# Patient Record
Sex: MALE | Race: BLACK OR AFRICAN AMERICAN | Employment: FULL TIME | URBAN - METROPOLITAN AREA
[De-identification: names, ages, dates, MRNs, and addresses within clinical notes are randomized per-mention and may not be internally consistent; named-entity substitution may affect disease eponyms.]

---

## 2017-07-24 ENCOUNTER — HOSPITAL ENCOUNTER (EMERGENCY)
Age: 27
Discharge: HOME | End: 2017-07-24
Attending: EMERGENCY MEDICINE
Payer: MEDICAID

## 2017-07-24 VITALS
OXYGEN SATURATION: 100 % | WEIGHT: 150.25 LBS | HEIGHT: 66 IN | HEART RATE: 92 BPM | DIASTOLIC BLOOD PRESSURE: 84 MMHG | RESPIRATION RATE: 16 BRPM | SYSTOLIC BLOOD PRESSURE: 150 MMHG | BODY MASS INDEX: 24.15 KG/M2 | TEMPERATURE: 98.7 F

## 2017-07-24 DIAGNOSIS — I10 ESSENTIAL HYPERTENSION: ICD-10-CM

## 2017-07-24 DIAGNOSIS — Z76.0 ENCOUNTER FOR MEDICATION REFILL: Primary | ICD-10-CM

## 2017-07-24 LAB
CHP ED QC CHECK: YES
GLUCOSE BLD-MCNC: 150 MG/DL
GLUCOSE BLD-MCNC: 150 MG/DL (ref 75–110)

## 2017-07-24 PROCEDURE — 82947 ASSAY GLUCOSE BLOOD QUANT: CPT

## 2017-07-24 PROCEDURE — 99281 EMR DPT VST MAYX REQ PHY/QHP: CPT

## 2017-09-08 ENCOUNTER — APPOINTMENT (OUTPATIENT)
Dept: GENERAL RADIOLOGY | Age: 27
End: 2017-09-08
Payer: MEDICAID

## 2017-09-08 ENCOUNTER — HOSPITAL ENCOUNTER (EMERGENCY)
Age: 27
Discharge: HOME | End: 2017-09-08
Attending: EMERGENCY MEDICINE
Payer: MEDICAID

## 2017-09-08 VITALS
WEIGHT: 151.8 LBS | TEMPERATURE: 98.7 F | BODY MASS INDEX: 24.4 KG/M2 | OXYGEN SATURATION: 98 % | RESPIRATION RATE: 18 BRPM | HEIGHT: 66 IN | SYSTOLIC BLOOD PRESSURE: 150 MMHG | DIASTOLIC BLOOD PRESSURE: 89 MMHG | HEART RATE: 99 BPM

## 2017-09-08 DIAGNOSIS — J06.9 VIRAL URI: Primary | ICD-10-CM

## 2017-09-08 LAB
ABSOLUTE EOS #: 0.1 K/UL (ref 0–0.4)
ABSOLUTE LYMPH #: 1.4 K/UL (ref 1–4.8)
ABSOLUTE MONO #: 0.4 K/UL (ref 0.2–0.8)
AMYLASE: 56 U/L (ref 28–100)
ANION GAP SERPL CALCULATED.3IONS-SCNC: 18 MMOL/L (ref 9–17)
BASOPHILS # BLD: 0 %
BASOPHILS ABSOLUTE: 0 K/UL (ref 0–0.2)
BETA-HYDROXYBUTYRATE: 0.96 MMOL/L (ref 0.02–0.27)
BUN BLDV-MCNC: 17 MG/DL (ref 6–20)
BUN/CREAT BLD: 17 (ref 9–20)
CALCIUM SERPL-MCNC: 9.6 MG/DL (ref 8.6–10.4)
CHLORIDE BLD-SCNC: 96 MMOL/L (ref 98–107)
CHP ED QC CHECK: NORMAL
CO2: 24 MMOL/L (ref 20–31)
CREAT SERPL-MCNC: 0.99 MG/DL (ref 0.7–1.2)
DIFFERENTIAL TYPE: NORMAL
DIRECT EXAM: NORMAL
EOSINOPHILS RELATIVE PERCENT: 1 %
GFR AFRICAN AMERICAN: >60 ML/MIN
GFR NON-AFRICAN AMERICAN: >60 ML/MIN
GFR SERPL CREATININE-BSD FRML MDRD: ABNORMAL ML/MIN/{1.73_M2}
GFR SERPL CREATININE-BSD FRML MDRD: ABNORMAL ML/MIN/{1.73_M2}
GLUCOSE BLD-MCNC: 134 MG/DL
GLUCOSE BLD-MCNC: 134 MG/DL (ref 75–110)
GLUCOSE BLD-MCNC: 142 MG/DL (ref 70–99)
HCT VFR BLD CALC: 43.6 % (ref 41–53)
HEMOGLOBIN: 14.6 G/DL (ref 13.5–17.5)
LACTIC ACID, SEPSIS WHOLE BLOOD: NORMAL MMOL/L (ref 0.5–1.9)
LACTIC ACID, SEPSIS: 1.1 MMOL/L (ref 0.5–1.9)
LIPASE: 7 U/L (ref 13–60)
LYMPHOCYTES # BLD: 27 %
Lab: NORMAL
MCH RBC QN AUTO: 31.7 PG (ref 26–34)
MCHC RBC AUTO-ENTMCNC: 33.4 G/DL (ref 31–37)
MCV RBC AUTO: 95.1 FL (ref 80–100)
MONOCYTES # BLD: 8 %
PDW BLD-RTO: 13.7 % (ref 11.5–14.5)
PLATELET # BLD: 256 K/UL (ref 130–400)
PLATELET ESTIMATE: NORMAL
PMV BLD AUTO: 7.1 FL (ref 6–12)
POTASSIUM SERPL-SCNC: 3.6 MMOL/L (ref 3.7–5.3)
RBC # BLD: 4.59 M/UL (ref 4.5–5.9)
RBC # BLD: NORMAL 10*6/UL
SEG NEUTROPHILS: 64 %
SEGMENTED NEUTROPHILS ABSOLUTE COUNT: 3.2 K/UL (ref 1.8–7.7)
SODIUM BLD-SCNC: 138 MMOL/L (ref 135–144)
SPECIMEN DESCRIPTION: NORMAL
STATUS: NORMAL
TOTAL CK: 360 U/L (ref 39–308)
WBC # BLD: 5 K/UL (ref 3.5–11)
WBC # BLD: NORMAL 10*3/UL

## 2017-09-08 PROCEDURE — 85025 COMPLETE CBC W/AUTO DIFF WBC: CPT

## 2017-09-08 PROCEDURE — 83690 ASSAY OF LIPASE: CPT

## 2017-09-08 PROCEDURE — 81003 URINALYSIS AUTO W/O SCOPE: CPT

## 2017-09-08 PROCEDURE — 82947 ASSAY GLUCOSE BLOOD QUANT: CPT

## 2017-09-08 PROCEDURE — 2580000003 HC RX 258: Performed by: EMERGENCY MEDICINE

## 2017-09-08 PROCEDURE — 87651 STREP A DNA AMP PROBE: CPT

## 2017-09-08 PROCEDURE — 71020 XR CHEST STANDARD TWO VW: CPT

## 2017-09-08 PROCEDURE — 96361 HYDRATE IV INFUSION ADD-ON: CPT

## 2017-09-08 PROCEDURE — 87040 BLOOD CULTURE FOR BACTERIA: CPT

## 2017-09-08 PROCEDURE — 82010 KETONE BODYS QUAN: CPT

## 2017-09-08 PROCEDURE — 80048 BASIC METABOLIC PNL TOTAL CA: CPT

## 2017-09-08 PROCEDURE — 82550 ASSAY OF CK (CPK): CPT

## 2017-09-08 PROCEDURE — 99284 EMERGENCY DEPT VISIT MOD MDM: CPT

## 2017-09-08 PROCEDURE — 6360000002 HC RX W HCPCS: Performed by: EMERGENCY MEDICINE

## 2017-09-08 PROCEDURE — 96374 THER/PROPH/DIAG INJ IV PUSH: CPT

## 2017-09-08 PROCEDURE — 82150 ASSAY OF AMYLASE: CPT

## 2017-09-08 PROCEDURE — 83605 ASSAY OF LACTIC ACID: CPT

## 2017-09-08 RX ORDER — ONDANSETRON 4 MG/1
4 TABLET, FILM COATED ORAL EVERY 8 HOURS PRN
Qty: 22 TABLET | Refills: 0 | Status: SHIPPED | OUTPATIENT
Start: 2017-09-08 | End: 2018-01-24 | Stop reason: ALTCHOICE

## 2017-09-08 RX ORDER — 0.9 % SODIUM CHLORIDE 0.9 %
1000 INTRAVENOUS SOLUTION INTRAVENOUS ONCE
Status: COMPLETED | OUTPATIENT
Start: 2017-09-08 | End: 2017-09-08

## 2017-09-08 RX ORDER — ONDANSETRON 2 MG/ML
4 INJECTION INTRAMUSCULAR; INTRAVENOUS ONCE
Status: COMPLETED | OUTPATIENT
Start: 2017-09-08 | End: 2017-09-08

## 2017-09-08 RX ORDER — SODIUM CHLORIDE 9 MG/ML
INJECTION, SOLUTION INTRAVENOUS CONTINUOUS
Status: DISCONTINUED | OUTPATIENT
Start: 2017-09-08 | End: 2017-09-08 | Stop reason: HOSPADM

## 2017-09-08 RX ADMIN — SODIUM CHLORIDE 1000 ML: 9 INJECTION, SOLUTION INTRAVENOUS at 18:57

## 2017-09-08 RX ADMIN — ONDANSETRON 4 MG: 2 INJECTION INTRAMUSCULAR; INTRAVENOUS at 19:05

## 2017-09-08 RX ADMIN — SODIUM CHLORIDE: 9 INJECTION, SOLUTION INTRAVENOUS at 19:57

## 2017-09-08 ASSESSMENT — ENCOUNTER SYMPTOMS
VOMITING: 0
ABDOMINAL DISTENTION: 0
NAUSEA: 1
SHORTNESS OF BREATH: 0
WHEEZING: 0
COUGH: 0
RHINORRHEA: 1
DIARRHEA: 0

## 2017-09-08 ASSESSMENT — PAIN SCALES - GENERAL: PAINLEVEL_OUTOF10: 8

## 2017-09-09 LAB
DIRECT EXAM: NORMAL
DIRECT EXAM: NORMAL
Lab: NORMAL
Lab: NORMAL
SPECIMEN DESCRIPTION: NORMAL
SPECIMEN DESCRIPTION: NORMAL
STATUS: NORMAL

## 2017-09-14 LAB
CULTURE: NORMAL
Lab: NORMAL
Lab: NORMAL
SPECIMEN DESCRIPTION: NORMAL
STATUS: NORMAL
STATUS: NORMAL

## 2017-12-14 ENCOUNTER — HOSPITAL ENCOUNTER (EMERGENCY)
Age: 27
Discharge: HOME | End: 2017-12-14
Attending: EMERGENCY MEDICINE
Payer: MEDICAID

## 2017-12-14 VITALS
DIASTOLIC BLOOD PRESSURE: 94 MMHG | SYSTOLIC BLOOD PRESSURE: 150 MMHG | RESPIRATION RATE: 18 BRPM | TEMPERATURE: 98.8 F | HEIGHT: 66 IN | BODY MASS INDEX: 22.5 KG/M2 | OXYGEN SATURATION: 99 % | WEIGHT: 140 LBS | HEART RATE: 94 BPM

## 2017-12-14 DIAGNOSIS — Z76.0 ENCOUNTER FOR MEDICATION REFILL: Primary | ICD-10-CM

## 2017-12-14 DIAGNOSIS — R73.9 HYPERGLYCEMIA: ICD-10-CM

## 2017-12-14 LAB
CHP ED QC CHECK: YES
CHP ED QC CHECK: YES
GLUCOSE BLD-MCNC: 257 MG/DL
GLUCOSE BLD-MCNC: 257 MG/DL (ref 75–110)
GLUCOSE BLD-MCNC: 368 MG/DL
GLUCOSE BLD-MCNC: 368 MG/DL (ref 75–110)

## 2017-12-14 PROCEDURE — 82947 ASSAY GLUCOSE BLOOD QUANT: CPT

## 2017-12-14 PROCEDURE — 96372 THER/PROPH/DIAG INJ SC/IM: CPT

## 2017-12-14 PROCEDURE — 6370000000 HC RX 637 (ALT 250 FOR IP): Performed by: STUDENT IN AN ORGANIZED HEALTH CARE EDUCATION/TRAINING PROGRAM

## 2017-12-14 PROCEDURE — 99281 EMR DPT VST MAYX REQ PHY/QHP: CPT

## 2017-12-14 RX ADMIN — INSULIN LISPRO 8 UNITS: 100 INJECTION, SOLUTION INTRAVENOUS; SUBCUTANEOUS at 11:49

## 2017-12-14 ASSESSMENT — ENCOUNTER SYMPTOMS
ABDOMINAL PAIN: 0
CONSTIPATION: 0
CHEST TIGHTNESS: 0
DIARRHEA: 0
VOMITING: 0
BACK PAIN: 0
ABDOMINAL DISTENTION: 0
BLOOD IN STOOL: 0
SORE THROAT: 0
SHORTNESS OF BREATH: 0
COUGH: 0
PHOTOPHOBIA: 0
NAUSEA: 0

## 2018-01-24 ENCOUNTER — HOSPITAL ENCOUNTER (OUTPATIENT)
Age: 28
Setting detail: SPECIMEN
Discharge: HOME | End: 2018-01-24
Payer: MEDICAID

## 2018-01-24 ENCOUNTER — OFFICE VISIT (OUTPATIENT)
Dept: FAMILY MEDICINE CLINIC | Age: 28
End: 2018-01-24
Payer: MEDICAID

## 2018-01-24 VITALS
SYSTOLIC BLOOD PRESSURE: 140 MMHG | HEIGHT: 66 IN | DIASTOLIC BLOOD PRESSURE: 70 MMHG | TEMPERATURE: 97.9 F | WEIGHT: 160.8 LBS | HEART RATE: 93 BPM | BODY MASS INDEX: 25.84 KG/M2

## 2018-01-24 DIAGNOSIS — E10.9 TYPE 1 DIABETES MELLITUS WITHOUT COMPLICATION (HCC): Primary | ICD-10-CM

## 2018-01-24 LAB
CREATININE URINE: 245.8 MG/DL (ref 39–259)
HBA1C MFR BLD: 9 %
MICROALBUMIN/CREAT 24H UR: 1916 MG/L
MICROALBUMIN/CREAT UR-RTO: 779 MCG/MG CREAT

## 2018-01-24 PROCEDURE — 82043 UR ALBUMIN QUANTITATIVE: CPT | Performed by: STUDENT IN AN ORGANIZED HEALTH CARE EDUCATION/TRAINING PROGRAM

## 2018-01-24 PROCEDURE — 83036 HEMOGLOBIN GLYCOSYLATED A1C: CPT | Performed by: STUDENT IN AN ORGANIZED HEALTH CARE EDUCATION/TRAINING PROGRAM

## 2018-01-24 PROCEDURE — 99214 OFFICE O/P EST MOD 30 MIN: CPT | Performed by: STUDENT IN AN ORGANIZED HEALTH CARE EDUCATION/TRAINING PROGRAM

## 2018-01-24 PROCEDURE — 99213 OFFICE O/P EST LOW 20 MIN: CPT

## 2018-01-24 ASSESSMENT — ENCOUNTER SYMPTOMS
SHORTNESS OF BREATH: 0
ABDOMINAL PAIN: 0
COUGH: 0

## 2018-01-24 NOTE — PATIENT INSTRUCTIONS
Thank you for letting us take care of you today. We hope all your questions were addressed. If a question was overlooked or something else comes to mind after you return home, please contact a member of your Care Team listed below. Please make sure you have a routine office visit set up to follow-up on [address]. Your Care Team at [place] [address] is Team #3  Юлия Shea MD (Faculty)  Army Erica MD (Faculty  Skip Hernandez MD (Resident)  Renae Poe MD (Resident)  Maria Luisa Ying MD (Resident)  Cole Khan MD (Resident)  [address], [facility]  Sunny Bird, [facility]  Tamiko Salas ([facility])  Ace Mendez RN, ([address] )  Rui Gregg, Ph.D., (Behavioral Services)  Heber Fuentes, [address] [place] [address] (Clinical Pharmacist)     Office phone number: [phone]    If you need to get in right away due to illness, please be advised we have \"Same Day\" appointments available Monday-Friday. Please call us at [phone] option #1 to schedule your \"Same Day\" appointment.

## 2018-01-24 NOTE — PROGRESS NOTES
Subjective:      Patient ID: Anisha Segal is a 32 y.o. male with history of type 1 diabetes diagnosed at 8years old, non-compliance, presents to establish new care. Patient has ran out of his medications multiple times in the past due to missing appointments and not having refills. Patient was recently dismissed from his last PCP a few months ago. Patient was recently seen in the ED and given refills for his insulin supplies. Patient denies polyuria, polydipsia or polyphagia. Denies fever or nausea or vomiting or abdominal pain. Denies dizziness. Says he has some type of syndrome possibly [place]. Patient smokes half a pack per day for the past 2 years. Denies alcohol. Denies illicit drug abuse except marijuana. Denies history of MI at an early age and family. Patient does not work currently. Used to [employer]. Is currently looking for work. HPI    Review of Systems   Constitutional: Negative for fatigue, fever and unexpected weight change. Eyes: Negative for visual disturbance. Respiratory: Negative for cough and shortness of breath. Cardiovascular: Negative for chest pain, palpitations and leg swelling. Gastrointestinal: Negative for abdominal pain. Genitourinary: Negative for difficulty urinating. Neurological: Negative for dizziness, light-headedness and headaches. Objective:   Physical Exam   Constitutional: He is oriented to person, place, and time. Cardiovascular: Normal rate, regular rhythm, normal heart sounds and intact distal pulses. Exam reveals no gallop and no friction rub. No murmur heard. Pulmonary/Chest: Effort normal and breath sounds normal. No respiratory distress. He has no wheezes. He has no rales. He exhibits no tenderness. Abdominal: Soft. Bowel sounds are normal. He exhibits no distension and no mass. There is no tenderness. There is no rebound and no guarding. Neurological: He is alert and oriented to person, place, and time.

## 2018-02-08 ENCOUNTER — HOSPITAL ENCOUNTER (EMERGENCY)
Age: 28
Discharge: HOME | End: 2018-02-08
Payer: MEDICAID

## 2018-02-08 ENCOUNTER — APPOINTMENT (OUTPATIENT)
Dept: GENERAL RADIOLOGY | Age: 28
End: 2018-02-08
Payer: MEDICAID

## 2018-02-08 VITALS
HEIGHT: 66 IN | SYSTOLIC BLOOD PRESSURE: 137 MMHG | HEART RATE: 109 BPM | WEIGHT: 160.44 LBS | BODY MASS INDEX: 25.78 KG/M2 | OXYGEN SATURATION: 91 % | RESPIRATION RATE: 34 BRPM | TEMPERATURE: 97.5 F | DIASTOLIC BLOOD PRESSURE: 71 MMHG

## 2018-02-08 DIAGNOSIS — J40 BRONCHITIS: Primary | ICD-10-CM

## 2018-02-08 DIAGNOSIS — J06.9 UPPER RESPIRATORY TRACT INFECTION, UNSPECIFIED TYPE: ICD-10-CM

## 2018-02-08 LAB
ABSOLUTE EOS #: 0.2 K/UL (ref 0–0.4)
ABSOLUTE IMMATURE GRANULOCYTE: ABNORMAL K/UL (ref 0–0.3)
ABSOLUTE LYMPH #: 2.3 K/UL (ref 1–4.8)
ABSOLUTE MONO #: 0.7 K/UL (ref 0.2–0.8)
ANION GAP SERPL CALCULATED.3IONS-SCNC: 17 MMOL/L (ref 9–17)
BASOPHILS # BLD: 1 % (ref 0–2)
BASOPHILS ABSOLUTE: 0.1 K/UL (ref 0–0.2)
BUN BLDV-MCNC: 18 MG/DL (ref 6–20)
BUN/CREAT BLD: 18 (ref 9–20)
CALCIUM SERPL-MCNC: 9.1 MG/DL (ref 8.6–10.4)
CHLORIDE BLD-SCNC: 101 MMOL/L (ref 98–107)
CO2: 23 MMOL/L (ref 20–31)
CREAT SERPL-MCNC: 0.98 MG/DL (ref 0.7–1.2)
DIFFERENTIAL TYPE: ABNORMAL
DIRECT EXAM: NORMAL
EKG ATRIAL RATE: 114 BPM
EKG P AXIS: 71 DEGREES
EKG P-R INTERVAL: 114 MS
EKG Q-T INTERVAL: 332 MS
EKG QRS DURATION: 88 MS
EKG QTC CALCULATION (BAZETT): 457 MS
EKG R AXIS: 10 DEGREES
EKG T AXIS: 37 DEGREES
EKG VENTRICULAR RATE: 114 BPM
EOSINOPHILS RELATIVE PERCENT: 3 % (ref 1–4)
GFR AFRICAN AMERICAN: >60 ML/MIN
GFR NON-AFRICAN AMERICAN: >60 ML/MIN
GFR SERPL CREATININE-BSD FRML MDRD: ABNORMAL ML/MIN/{1.73_M2}
GFR SERPL CREATININE-BSD FRML MDRD: ABNORMAL ML/MIN/{1.73_M2}
GLUCOSE BLD-MCNC: 141 MG/DL (ref 70–99)
GLUCOSE BLD-MCNC: 237 MG/DL (ref 75–110)
HCT VFR BLD CALC: 40.1 % (ref 41–53)
HEMOGLOBIN: 13.5 G/DL (ref 13.5–17.5)
IMMATURE GRANULOCYTES: ABNORMAL %
LYMPHOCYTES # BLD: 30 % (ref 24–44)
Lab: NORMAL
MCH RBC QN AUTO: 31.9 PG (ref 26–34)
MCHC RBC AUTO-ENTMCNC: 33.7 G/DL (ref 31–37)
MCV RBC AUTO: 94.7 FL (ref 80–100)
MONOCYTES # BLD: 10 % (ref 1–7)
MYOGLOBIN: 86 NG/ML (ref 28–72)
NRBC AUTOMATED: ABNORMAL PER 100 WBC
PDW BLD-RTO: 13.4 % (ref 11.5–14.5)
PLATELET # BLD: 255 K/UL (ref 130–400)
PLATELET ESTIMATE: ABNORMAL
PMV BLD AUTO: ABNORMAL FL (ref 6–12)
POTASSIUM SERPL-SCNC: 3.7 MMOL/L (ref 3.7–5.3)
RBC # BLD: 4.23 M/UL (ref 4.5–5.9)
RBC # BLD: ABNORMAL 10*6/UL
SEG NEUTROPHILS: 56 % (ref 36–66)
SEGMENTED NEUTROPHILS ABSOLUTE COUNT: 4.5 K/UL (ref 1.8–7.7)
SODIUM BLD-SCNC: 141 MMOL/L (ref 135–144)
SPECIMEN DESCRIPTION: NORMAL
STATUS: NORMAL
TROPONIN INTERP: ABNORMAL
TROPONIN T: <0.03 NG/ML
WBC # BLD: 7.8 K/UL (ref 3.5–11)
WBC # BLD: ABNORMAL 10*3/UL

## 2018-02-08 PROCEDURE — 83874 ASSAY OF MYOGLOBIN: CPT

## 2018-02-08 PROCEDURE — 2580000003 HC RX 258

## 2018-02-08 PROCEDURE — 84484 ASSAY OF TROPONIN QUANT: CPT

## 2018-02-08 PROCEDURE — 94640 AIRWAY INHALATION TREATMENT: CPT

## 2018-02-08 PROCEDURE — 96374 THER/PROPH/DIAG INJ IV PUSH: CPT

## 2018-02-08 PROCEDURE — 36415 COLL VENOUS BLD VENIPUNCTURE: CPT

## 2018-02-08 PROCEDURE — 87804 INFLUENZA ASSAY W/OPTIC: CPT

## 2018-02-08 PROCEDURE — 80048 BASIC METABOLIC PNL TOTAL CA: CPT

## 2018-02-08 PROCEDURE — 94150 VITAL CAPACITY TEST: CPT

## 2018-02-08 PROCEDURE — 6370000000 HC RX 637 (ALT 250 FOR IP)

## 2018-02-08 PROCEDURE — 99285 EMERGENCY DEPT VISIT HI MDM: CPT

## 2018-02-08 PROCEDURE — 2700000000 HC OXYGEN THERAPY PER DAY

## 2018-02-08 PROCEDURE — 85025 COMPLETE CBC W/AUTO DIFF WBC: CPT

## 2018-02-08 PROCEDURE — 6360000002 HC RX W HCPCS: Performed by: PODIATRIST

## 2018-02-08 PROCEDURE — 94761 N-INVAS EAR/PLS OXIMETRY MLT: CPT

## 2018-02-08 PROCEDURE — 71046 X-RAY EXAM CHEST 2 VIEWS: CPT

## 2018-02-08 PROCEDURE — 93005 ELECTROCARDIOGRAM TRACING: CPT

## 2018-02-08 PROCEDURE — 6360000002 HC RX W HCPCS

## 2018-02-08 PROCEDURE — 82947 ASSAY GLUCOSE BLOOD QUANT: CPT

## 2018-02-08 RX ORDER — ALBUTEROL SULFATE 90 UG/1
2 AEROSOL, METERED RESPIRATORY (INHALATION)
Status: DISCONTINUED | OUTPATIENT
Start: 2018-02-08 | End: 2018-02-08 | Stop reason: HOSPADM

## 2018-02-08 RX ORDER — ONDANSETRON 4 MG/1
4 TABLET, FILM COATED ORAL EVERY 8 HOURS PRN
Qty: 10 TABLET | Refills: 0 | Status: ON HOLD | OUTPATIENT
Start: 2018-02-08 | End: 2018-03-22 | Stop reason: SDUPTHER

## 2018-02-08 RX ORDER — AZITHROMYCIN 250 MG/1
TABLET, FILM COATED ORAL
Qty: 1 PACKET | Refills: 0 | Status: SHIPPED | OUTPATIENT
Start: 2018-02-08 | End: 2018-02-18

## 2018-02-08 RX ORDER — 0.9 % SODIUM CHLORIDE 0.9 %
1000 INTRAVENOUS SOLUTION INTRAVENOUS ONCE
Status: COMPLETED | OUTPATIENT
Start: 2018-02-08 | End: 2018-02-08

## 2018-02-08 RX ORDER — ONDANSETRON 2 MG/ML
4 INJECTION INTRAMUSCULAR; INTRAVENOUS ONCE
Status: COMPLETED | OUTPATIENT
Start: 2018-02-08 | End: 2018-02-08

## 2018-02-08 RX ORDER — IPRATROPIUM BROMIDE AND ALBUTEROL SULFATE 2.5; .5 MG/3ML; MG/3ML
1 SOLUTION RESPIRATORY (INHALATION)
Status: DISCONTINUED | OUTPATIENT
Start: 2018-02-08 | End: 2018-02-08 | Stop reason: HOSPADM

## 2018-02-08 RX ORDER — ALBUTEROL SULFATE 2.5 MG/3ML
5 SOLUTION RESPIRATORY (INHALATION)
Status: DISCONTINUED | OUTPATIENT
Start: 2018-02-08 | End: 2018-02-08 | Stop reason: HOSPADM

## 2018-02-08 RX ORDER — CLONIDINE HYDROCHLORIDE 0.1 MG/1
0.1 TABLET ORAL ONCE
Status: COMPLETED | OUTPATIENT
Start: 2018-02-08 | End: 2018-02-08

## 2018-02-08 RX ORDER — ACETAMINOPHEN 500 MG
1000 TABLET ORAL ONCE
Status: COMPLETED | OUTPATIENT
Start: 2018-02-08 | End: 2018-02-08

## 2018-02-08 RX ADMIN — ALBUTEROL SULFATE 5 MG: 5 SOLUTION RESPIRATORY (INHALATION) at 09:02

## 2018-02-08 RX ADMIN — ACETAMINOPHEN 1000 MG: 500 TABLET ORAL at 08:44

## 2018-02-08 RX ADMIN — CLONIDINE HYDROCHLORIDE 0.1 MG: 0.1 TABLET ORAL at 08:55

## 2018-02-08 RX ADMIN — ALBUTEROL SULFATE 5 MG: 5 SOLUTION RESPIRATORY (INHALATION) at 08:51

## 2018-02-08 RX ADMIN — SODIUM CHLORIDE 1000 ML: 9 INJECTION, SOLUTION INTRAVENOUS at 08:53

## 2018-02-08 RX ADMIN — ONDANSETRON HYDROCHLORIDE 4 MG: 2 INJECTION, SOLUTION INTRAVENOUS at 11:00

## 2018-02-08 ASSESSMENT — ENCOUNTER SYMPTOMS
DIARRHEA: 0
TROUBLE SWALLOWING: 1
COUGH: 1
ABDOMINAL DISTENTION: 0
ABDOMINAL PAIN: 0
NAUSEA: 0
VOMITING: 0
SORE THROAT: 1
SHORTNESS OF BREATH: 1
BACK PAIN: 0

## 2018-02-08 ASSESSMENT — PAIN DESCRIPTION - DESCRIPTORS: DESCRIPTORS: ACHING

## 2018-02-08 ASSESSMENT — PAIN SCALES - GENERAL
PAINLEVEL_OUTOF10: 8
PAINLEVEL_OUTOF10: 8

## 2018-02-08 ASSESSMENT — PAIN DESCRIPTION - PAIN TYPE: TYPE: ACUTE PAIN

## 2018-02-08 NOTE — PROGRESS NOTES
· Bronchodilator assessment   []    Bronchodilator Assessment    FEV1 % PREDICTED 32%  FEV1 actual: 1.1  PEFR  224    PEFR % Predicted 43%  RR 24  Bronchodilator assessment at level  3  BRONCHODILATOR ASSESSMENT SCORE  Score 1 2 3 4   Breath Sounds   []  Clear []  Mild Wheezing with good aeration [x]  Moderate I/E wheezing with adequate aeration []  Poor Aeration or diffuse wheezing   Respiratory Rate []  Less than 20 [x]  20-25 []  Greater than 25  []  Greater than 35    Dyspnea []  No SOB  []  SOB with minimal activity [x]  Speaking in partial sentences []  Acute/ At rest   Peakflow (asthma) []  80 % or greater predicted/PB  []  Unable []  70% or greater predicted/PB  []  Unable []  51%-70% predicted/PB  []  Unable []  Less than 50% predicted/PB  []  Unable due to distress   FEV1 % Predicted []  Greater than 69%  []  Unable  []  Less than 50%-69%  []  Unable  []  Less than 35%-49%  []  Unable  [x]  Less than 35%  []  Unable due to distress     MDI Instruction

## 2018-02-08 NOTE — ED PROVIDER NOTES
History:   Diagnosis Date    Diabetes mellitus ([facility] [address]     HLD (hyperlipidemia)     Smoking 5/28/2013         SURGICAL HISTORY     History reviewed. No pertinent surgical history. CURRENT MEDICATIONS       Previous Medications    IBUPROFEN (ADVIL;MOTRIN) 800 MG TABLET    Take 1 tablet by mouth every 8 hours as needed for Pain    INSULIN LISPRO PROT & LISPRO (HUMALOG MIX 50/50) (50-50) 100 UNIT PER ML SUSP INJECTION    Inject 0.1 mLs into the skin 3 times daily (with meals)    INSULIN LISPRO PROTAMINE & LISPRO (HUMALOG MIX) (75-25) 100 UNIT PER ML SUSP INJECTION VIAL    Inject 30 Units into the skin daily (with breakfast)    INSULIN PEN NEEDLE (MEIJER PEN NEEDLES) 31G X 6 MM MISC    1 each by Does not apply route daily       ALLERGIES     Review of patient's allergies indicates no known allergies. FAMILY HISTORY     History reviewed. No pertinent family history. SOCIAL HISTORY       Social History     Social History    Marital status: Single     Spouse name: N/A    Number of children: N/A    Years of education: N/A     Social History Main Topics    Smoking status: Current Every Day Smoker     Packs/day: 0.50     Years: 0.50     Types: Cigarettes     Last attempt to quit: 7/31/2013    Smokeless tobacco: Never Used    Alcohol use No    Drug use: Yes     Types: Marijuana      Comment: occasional    Sexual activity: Yes     Partners: Female     Other Topics Concern    None     Social History Narrative    None       SCREENINGS             PHYSICAL EXAM    (up to 7 for level 4, 8 or more for level 5)     ED Triage Vitals [02/08/18 0826]   BP Temp Temp src Pulse Resp SpO2 Height Weight   (!) 187/100 97.5 °F (36.4 °C) -- 102 18 97 % 5' 6\" (1.676 m) 160 lb 7 oz (72.8 kg)       Physical Exam   Constitutional: He is oriented to person, place, and time. He appears well-developed and well-nourished. HENT:   Head: Normocephalic. Eyes: Pupils are equal, round, and reactive to light.    Neck: Normal range of motion. Neck supple. Cardiovascular: Normal rate, regular rhythm and normal heart sounds. Pulmonary/Chest: Accessory muscle usage present. He has decreased breath sounds in the right middle field, the right lower field, the left middle field and the left lower field. He has no rhonchi. He has no rales. Musculoskeletal: Normal range of motion. Neurological: He is alert and oriented to person, place, and time. Skin: Skin is warm. No rash noted. He is diaphoretic. No erythema. DIAGNOSTIC RESULTS     EKG: All EKG's are interpreted by the Emergency Department Physician who either signs or Co-signs this chart in the absence of a cardiologist.    Sinus tachycardia    RADIOLOGY:   Non-plain film images such as CT, Ultrasound and MRI are read by the radiologist. Plain radiographic images are visualized and preliminarily interpreted by the emergency physician with the below findings:    CXR no acute abnormalities. Interpretation per the Radiologist below, if available at the time of this note:    XR CHEST STANDARD (2 VW)   Final Result   No radiographic evidence for acute cardiopulmonary disease process. ED BEDSIDE ULTRASOUND:   Performed by ED Physician - none    LABS:  Labs Reviewed   BASIC METABOLIC PANEL - Abnormal; Notable for the following:        Result Value    Glucose 141 (*)     All other components within normal limits   CBC WITH AUTO DIFFERENTIAL - Abnormal; Notable for the following:     RBC 4.23 (*)     Hematocrit 40.1 (*)     Monocytes 10 (*)     All other components within normal limits   TROP/MYOGLOBIN - Abnormal; Notable for the following:     Myoglobin 86 (*)     All other components within normal limits   POC GLUCOSE FINGERSTICK - Abnormal; Notable for the following:     POC Glucose 237 (*)     All other components within normal limits   RAPID INFLUENZA A/B ANTIGENS       All other labs were within normal range or not returned as of this dictation.     EMERGENCY albuterol sulfate  (90 Base) MCG/ACT inhaler 2 puff (not administered)     And   ipratropium (ATROVENT HFA) 17 MCG/ACT inhaler 2 puff (not administered)   ipratropium (ATROVENT) 0.02 % nebulizer solution 0.5 mg (not administered)   0.9 % sodium chloride bolus (0 mLs Intravenous Stopped 2/8/18 1105)   acetaminophen (TYLENOL) tablet 1,000 mg (1,000 mg Oral Given 2/8/18 0844)   cloNIDine (CATAPRES) tablet 0.1 mg (0.1 mg Oral Given 2/8/18 0855)   ondansetron (ZOFRAN) injection 4 mg (4 mg Intravenous Given 2/8/18 1100)       New Prescriptions from this visit:    New Prescriptions    AZITHROMYCIN (ZITHROMAX) 250 MG TABLET    zpak as directed. ONDANSETRON (ZOFRAN) 4 MG TABLET    Take 1 tablet by mouth every 8 hours as needed for Nausea or Vomiting       Follow-up:  Flaquita Tee MD  [address] [place] [id number]      As needed    [facility] ED  [address] [facility]  [phone]    If symptoms worsen        Final Impression:   1. Bronchitis    2.  Upper respiratory tract infection, unspecified type               (Please note that portions of this note were completed with a voice recognition program.  Efforts were made to edit the dictations but occasionally words are mis-transcribed.)    Angie Jones DPM (electronically signed)             Angie Jones DPM  Resident  02/08/18 6341

## 2018-02-12 ENCOUNTER — TELEPHONE (OUTPATIENT)
Dept: FAMILY MEDICINE CLINIC | Age: 28
End: 2018-02-12

## 2018-02-12 DIAGNOSIS — E10.9 TYPE 1 DIABETES MELLITUS WITHOUT COMPLICATION (HCC): Primary | ICD-10-CM

## 2018-02-15 ENCOUNTER — TELEPHONE (OUTPATIENT)
Dept: FAMILY MEDICINE CLINIC | Age: 28
End: 2018-02-15

## 2018-02-15 DIAGNOSIS — K21.9 GASTROESOPHAGEAL REFLUX DISEASE WITHOUT ESOPHAGITIS: Primary | ICD-10-CM

## 2018-02-15 RX ORDER — PANTOPRAZOLE SODIUM 20 MG/1
20 TABLET, DELAYED RELEASE ORAL DAILY
Qty: 30 TABLET | Refills: 0 | Status: SHIPPED | OUTPATIENT
Start: 2018-02-15 | End: 2019-02-18 | Stop reason: SDUPTHER

## 2018-02-15 NOTE — TELEPHONE ENCOUNTER
Patient called in regards to his insulin syringes. He states the pharmacy has refills for the \"pen\", but he states he needs the syringes to use for the bottle. He states he does not use the pen for his medication. Please call patient back and advise, he is waiting on a return call from the office. He can be reached at [phone] or [phone].

## 2018-02-16 RX ORDER — INSULIN LISPRO 100 [IU]/ML
30 INJECTION, SUSPENSION SUBCUTANEOUS
Qty: 5 PEN | Refills: 3 | Status: SHIPPED | OUTPATIENT
Start: 2018-02-16 | End: 2018-07-30 | Stop reason: SDUPTHER

## 2018-03-01 ENCOUNTER — APPOINTMENT (OUTPATIENT)
Dept: GENERAL RADIOLOGY | Age: 28
End: 2018-03-01
Payer: MEDICAID

## 2018-03-01 ENCOUNTER — HOSPITAL ENCOUNTER (EMERGENCY)
Age: 28
Discharge: HOME | End: 2018-03-01
Attending: EMERGENCY MEDICINE
Payer: MEDICAID

## 2018-03-01 VITALS
HEART RATE: 90 BPM | BODY MASS INDEX: 25.82 KG/M2 | OXYGEN SATURATION: 100 % | WEIGHT: 160 LBS | RESPIRATION RATE: 18 BRPM | SYSTOLIC BLOOD PRESSURE: 159 MMHG | DIASTOLIC BLOOD PRESSURE: 100 MMHG | TEMPERATURE: 97.3 F

## 2018-03-01 DIAGNOSIS — S39.012A STRAIN OF LUMBAR REGION, INITIAL ENCOUNTER: Primary | ICD-10-CM

## 2018-03-01 PROCEDURE — 72072 X-RAY EXAM THORAC SPINE 3VWS: CPT

## 2018-03-01 PROCEDURE — 72100 X-RAY EXAM L-S SPINE 2/3 VWS: CPT

## 2018-03-01 PROCEDURE — 6370000000 HC RX 637 (ALT 250 FOR IP): Performed by: EMERGENCY MEDICINE

## 2018-03-01 PROCEDURE — 99284 EMERGENCY DEPT VISIT MOD MDM: CPT

## 2018-03-01 RX ORDER — IBUPROFEN 800 MG/1
800 TABLET ORAL EVERY 8 HOURS PRN
Qty: 30 TABLET | Refills: 0 | Status: ON HOLD | OUTPATIENT
Start: 2018-03-01 | End: 2018-03-22 | Stop reason: SDUPTHER

## 2018-03-01 RX ORDER — CYCLOBENZAPRINE HCL 10 MG
10 TABLET ORAL 3 TIMES DAILY PRN
Qty: 30 TABLET | Refills: 0 | Status: SHIPPED | OUTPATIENT
Start: 2018-03-01 | End: 2018-03-11

## 2018-03-01 RX ORDER — IBUPROFEN 800 MG/1
800 TABLET ORAL ONCE
Status: COMPLETED | OUTPATIENT
Start: 2018-03-01 | End: 2018-03-01

## 2018-03-01 RX ADMIN — IBUPROFEN 800 MG: 800 TABLET ORAL at 08:58

## 2018-03-01 ASSESSMENT — ENCOUNTER SYMPTOMS
SHORTNESS OF BREATH: 0
NAUSEA: 0
SORE THROAT: 0
BACK PAIN: 1
VOMITING: 0
ABDOMINAL PAIN: 0

## 2018-03-01 ASSESSMENT — PAIN DESCRIPTION - LOCATION: LOCATION: BACK

## 2018-03-01 ASSESSMENT — PAIN SCALES - GENERAL
PAINLEVEL_OUTOF10: 8
PAINLEVEL_OUTOF10: 8

## 2018-03-01 ASSESSMENT — PAIN DESCRIPTION - DESCRIPTORS: DESCRIPTORS: SHOOTING;SHARP
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
## 2018-03-21 ENCOUNTER — APPOINTMENT (OUTPATIENT)
Dept: GENERAL RADIOLOGY | Age: 28
DRG: 249 | End: 2018-03-21
Payer: MEDICAID

## 2018-03-21 ENCOUNTER — HOSPITAL ENCOUNTER (EMERGENCY)
Age: 28
Discharge: HOME | DRG: 249 | End: 2018-03-21
Attending: EMERGENCY MEDICINE
Payer: MEDICAID

## 2018-03-21 VITALS
WEIGHT: 160 LBS | HEIGHT: 66 IN | OXYGEN SATURATION: 96 % | DIASTOLIC BLOOD PRESSURE: 67 MMHG | TEMPERATURE: 98.8 F | RESPIRATION RATE: 18 BRPM | SYSTOLIC BLOOD PRESSURE: 144 MMHG | BODY MASS INDEX: 25.71 KG/M2 | HEART RATE: 92 BPM

## 2018-03-21 DIAGNOSIS — B34.9 VIRAL ILLNESS: Primary | ICD-10-CM

## 2018-03-21 LAB
ABSOLUTE EOS #: 0.1 K/UL (ref 0–0.4)
ABSOLUTE IMMATURE GRANULOCYTE: ABNORMAL K/UL (ref 0–0.3)
ABSOLUTE LYMPH #: 0.6 K/UL (ref 1–4.8)
ABSOLUTE MONO #: 0.2 K/UL (ref 0.2–0.8)
ANION GAP SERPL CALCULATED.3IONS-SCNC: 11 MMOL/L (ref 9–17)
BASOPHILS # BLD: 0 % (ref 0–2)
BASOPHILS ABSOLUTE: 0 K/UL (ref 0–0.2)
BUN BLDV-MCNC: 14 MG/DL (ref 6–20)
BUN/CREAT BLD: 13 (ref 9–20)
CALCIUM SERPL-MCNC: 8.7 MG/DL (ref 8.6–10.4)
CHLORIDE BLD-SCNC: 101 MMOL/L (ref 98–107)
CO2: 25 MMOL/L (ref 20–31)
CREAT SERPL-MCNC: 1.08 MG/DL (ref 0.7–1.2)
DIFFERENTIAL TYPE: ABNORMAL
DIRECT EXAM: NORMAL
EKG ATRIAL RATE: 88 BPM
EKG P AXIS: 71 DEGREES
EKG P-R INTERVAL: 140 MS
EKG Q-T INTERVAL: 328 MS
EKG QRS DURATION: 72 MS
EKG QTC CALCULATION (BAZETT): 396 MS
EKG R AXIS: 22 DEGREES
EKG T AXIS: -7 DEGREES
EKG VENTRICULAR RATE: 88 BPM
EOSINOPHILS RELATIVE PERCENT: 2 % (ref 1–4)
GFR AFRICAN AMERICAN: >60 ML/MIN
GFR NON-AFRICAN AMERICAN: >60 ML/MIN
GFR SERPL CREATININE-BSD FRML MDRD: ABNORMAL ML/MIN/{1.73_M2}
GFR SERPL CREATININE-BSD FRML MDRD: ABNORMAL ML/MIN/{1.73_M2}
GLUCOSE BLD-MCNC: 194 MG/DL (ref 75–110)
GLUCOSE BLD-MCNC: 197 MG/DL (ref 70–99)
HCT VFR BLD CALC: 41.3 % (ref 41–53)
HEMOGLOBIN: 13.8 G/DL (ref 13.5–17.5)
IMMATURE GRANULOCYTES: ABNORMAL %
LYMPHOCYTES # BLD: 16 % (ref 24–44)
Lab: NORMAL
MCH RBC QN AUTO: 32.2 PG (ref 26–34)
MCHC RBC AUTO-ENTMCNC: 33.5 G/DL (ref 31–37)
MCV RBC AUTO: 96.2 FL (ref 80–100)
MONOCYTES # BLD: 6 % (ref 1–7)
NRBC AUTOMATED: ABNORMAL PER 100 WBC
PDW BLD-RTO: 14.1 % (ref 11.5–14.5)
PLATELET # BLD: 213 K/UL (ref 130–400)
PLATELET ESTIMATE: ABNORMAL
PMV BLD AUTO: 6.7 FL (ref 6–12)
POTASSIUM SERPL-SCNC: 3.6 MMOL/L (ref 3.7–5.3)
RBC # BLD: 4.29 M/UL (ref 4.5–5.9)
RBC # BLD: ABNORMAL 10*6/UL
SEG NEUTROPHILS: 76 % (ref 36–66)
SEGMENTED NEUTROPHILS ABSOLUTE COUNT: 2.8 K/UL (ref 1.8–7.7)
SODIUM BLD-SCNC: 137 MMOL/L (ref 135–144)
SPECIMEN DESCRIPTION: NORMAL
STATUS: NORMAL
WBC # BLD: 3.7 K/UL (ref 3.5–11)
WBC # BLD: ABNORMAL 10*3/UL

## 2018-03-21 PROCEDURE — 96374 THER/PROPH/DIAG INJ IV PUSH: CPT

## 2018-03-21 PROCEDURE — 99285 EMERGENCY DEPT VISIT HI MDM: CPT

## 2018-03-21 PROCEDURE — 80048 BASIC METABOLIC PNL TOTAL CA: CPT

## 2018-03-21 PROCEDURE — 6360000002 HC RX W HCPCS: Performed by: NURSE PRACTITIONER

## 2018-03-21 PROCEDURE — 85025 COMPLETE CBC W/AUTO DIFF WBC: CPT

## 2018-03-21 PROCEDURE — 2580000003 HC RX 258: Performed by: NURSE PRACTITIONER

## 2018-03-21 PROCEDURE — 87804 INFLUENZA ASSAY W/OPTIC: CPT

## 2018-03-21 PROCEDURE — 93005 ELECTROCARDIOGRAM TRACING: CPT

## 2018-03-21 PROCEDURE — 71046 X-RAY EXAM CHEST 2 VIEWS: CPT

## 2018-03-21 PROCEDURE — 82947 ASSAY GLUCOSE BLOOD QUANT: CPT

## 2018-03-21 RX ORDER — FLUTICASONE PROPIONATE 50 MCG
1 SPRAY, SUSPENSION (ML) NASAL DAILY
Qty: 1 BOTTLE | Refills: 0 | Status: SHIPPED | OUTPATIENT
Start: 2018-03-21 | End: 2019-02-18 | Stop reason: ALTCHOICE

## 2018-03-21 RX ORDER — BENZONATATE 200 MG/1
200 CAPSULE ORAL 3 TIMES DAILY PRN
Qty: 30 CAPSULE | Refills: 0 | Status: SHIPPED | OUTPATIENT
Start: 2018-03-21 | End: 2019-02-18 | Stop reason: ALTCHOICE

## 2018-03-21 RX ORDER — ONDANSETRON 4 MG/1
4 TABLET, ORALLY DISINTEGRATING ORAL EVERY 8 HOURS PRN
Qty: 20 TABLET | Refills: 0 | Status: SHIPPED | OUTPATIENT
Start: 2018-03-21 | End: 2019-02-18 | Stop reason: SDUPTHER

## 2018-03-21 RX ORDER — KETOROLAC TROMETHAMINE 30 MG/ML
30 INJECTION, SOLUTION INTRAMUSCULAR; INTRAVENOUS ONCE
Status: COMPLETED | OUTPATIENT
Start: 2018-03-21 | End: 2018-03-21

## 2018-03-21 RX ORDER — IBUPROFEN 800 MG/1
800 TABLET ORAL EVERY 8 HOURS PRN
Qty: 15 TABLET | Refills: 0 | Status: SHIPPED | OUTPATIENT
Start: 2018-03-21 | End: 2019-10-30

## 2018-03-21 RX ORDER — 0.9 % SODIUM CHLORIDE 0.9 %
1000 INTRAVENOUS SOLUTION INTRAVENOUS ONCE
Status: COMPLETED | OUTPATIENT
Start: 2018-03-21 | End: 2018-03-21

## 2018-03-21 RX ADMIN — SODIUM CHLORIDE 1000 ML: 9 INJECTION, SOLUTION INTRAVENOUS at 11:05

## 2018-03-21 RX ADMIN — KETOROLAC TROMETHAMINE 30 MG: 30 INJECTION, SOLUTION INTRAMUSCULAR; INTRAVENOUS at 12:28

## 2018-03-21 ASSESSMENT — ENCOUNTER SYMPTOMS
SHORTNESS OF BREATH: 0
RHINORRHEA: 1
DIARRHEA: 0
ABDOMINAL PAIN: 0
SORE THROAT: 1
NAUSEA: 0
VOMITING: 0
COUGH: 1
SINUS PRESSURE: 1
COLOR CHANGE: 0

## 2018-03-21 ASSESSMENT — PAIN SCALES - GENERAL
PAINLEVEL_OUTOF10: 10
PAINLEVEL_OUTOF10: 10

## 2018-03-21 ASSESSMENT — PAIN DESCRIPTION - DESCRIPTORS: DESCRIPTORS: PRESSURE;TIGHTNESS

## 2018-03-21 NOTE — ED PROVIDER NOTES
Attending Supervisory Note/Shared Visit   I have personally performed a face to face diagnostic evaluation on this patient. I have reviewed the mid-levels findings and agree.         (Please note that portions of this note were completed with a voice recognition program.  Efforts were made to edit the dictations but occasionally words are mis-transcribed.)    Rolanda Cote MD  Attending Emergency Physician        Rolanda Cote MD  03/21/18 3840
Potassium 3.6 (*)     All other components within normal limits   POC GLUCOSE FINGERSTICK - Abnormal; Notable for the following:     POC Glucose 194 (*)     All other components within normal limits   RAPID INFLUENZA A/B ANTIGENS       All other labs were within normal range or not returned as of this dictation. EMERGENCY DEPARTMENT COURSE and DIFFERENTIAL DIAGNOSIS/MDM:   Vitals:    Vitals:    03/21/18 1038   BP: (!) 144/67   Pulse: 92   Resp: 18   Temp: 98.8 °F (37.1 °C)   TempSrc: Oral   SpO2: 96%   Weight: 160 lb (72.6 kg)   Height: 5' 6\" (1.676 m)       MEDICATIONS GIVEN IN THE ED:  Medications   ketorolac (TORADOL) injection 30 mg (not administered)   0.9 % sodium chloride bolus (0 mLs Intravenous Stopped 3/21/18 1205)       CLINICAL DECISION MAKING:  The patient presented alert with a nontoxic appearance and was seen in conjunction with Dr. Burgess Oh. Laboratory studies were unremarkable. Influenza screen was negative. Chest x-ray was unremarkable. Prescriptions were written for tessalon perles, Zofran, Flonase, and motrin. Follow up with pcp, return to ED if condition worsens. FINAL IMPRESSION      1.  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

## 2018-03-22 ENCOUNTER — HOSPITAL ENCOUNTER (INPATIENT)
Age: 28
LOS: 1 days | Discharge: HOME | DRG: 249 | End: 2018-03-23
Attending: EMERGENCY MEDICINE | Admitting: INTERNAL MEDICINE
Payer: MEDICAID

## 2018-03-22 ENCOUNTER — APPOINTMENT (OUTPATIENT)
Dept: GENERAL RADIOLOGY | Age: 28
DRG: 249 | End: 2018-03-22
Payer: MEDICAID

## 2018-03-22 DIAGNOSIS — E86.0 DEHYDRATION: ICD-10-CM

## 2018-03-22 DIAGNOSIS — E74.04 MCARDLE DISEASE (HCC): ICD-10-CM

## 2018-03-22 DIAGNOSIS — M62.82 NON-TRAUMATIC RHABDOMYOLYSIS: Primary | ICD-10-CM

## 2018-03-22 PROBLEM — K52.9 GASTROENTERITIS: Status: ACTIVE | Noted: 2018-03-22

## 2018-03-22 PROBLEM — R11.2 NAUSEA AND VOMITING IN ADULT: Status: ACTIVE | Noted: 2018-03-22

## 2018-03-22 LAB
% CKMB: 0.2 % (ref 0–3.5)
-: ABNORMAL
ABSOLUTE EOS #: 0 K/UL (ref 0–0.4)
ABSOLUTE IMMATURE GRANULOCYTE: ABNORMAL K/UL (ref 0–0.3)
ABSOLUTE LYMPH #: 0.6 K/UL (ref 1–4.8)
ABSOLUTE MONO #: 0.2 K/UL (ref 0.2–0.8)
AMORPHOUS: ABNORMAL
ANION GAP SERPL CALCULATED.3IONS-SCNC: 16 MMOL/L (ref 9–17)
BACTERIA: ABNORMAL
BASOPHILS # BLD: 0 % (ref 0–2)
BASOPHILS ABSOLUTE: 0 K/UL (ref 0–0.2)
BETA-HYDROXYBUTYRATE: 1.48 MMOL/L (ref 0.02–0.27)
BILIRUBIN URINE: NEGATIVE
BUN BLDV-MCNC: 14 MG/DL (ref 6–20)
BUN/CREAT BLD: 13 (ref 9–20)
C-REACTIVE PROTEIN: 20.5 MG/L (ref 0–5)
CALCIUM SERPL-MCNC: 8.7 MG/DL (ref 8.6–10.4)
CASTS UA: ABNORMAL /LPF
CHLORIDE BLD-SCNC: 99 MMOL/L (ref 98–107)
CHP ED QC CHECK: NORMAL
CK MB: 2.8 NG/ML
CKMB INTERPRETATION: ABNORMAL
CO2: 22 MMOL/L (ref 20–31)
COLOR: YELLOW
COMMENT UA: ABNORMAL
CREAT SERPL-MCNC: 1.12 MG/DL (ref 0.7–1.2)
CRYSTALS, UA: ABNORMAL /HPF
DIFFERENTIAL TYPE: ABNORMAL
EOSINOPHILS RELATIVE PERCENT: 0 % (ref 1–4)
EPITHELIAL CELLS UA: ABNORMAL /HPF (ref 0–5)
GFR AFRICAN AMERICAN: >60 ML/MIN
GFR NON-AFRICAN AMERICAN: >60 ML/MIN
GFR SERPL CREATININE-BSD FRML MDRD: ABNORMAL ML/MIN/{1.73_M2}
GFR SERPL CREATININE-BSD FRML MDRD: ABNORMAL ML/MIN/{1.73_M2}
GLUCOSE BLD-MCNC: 144 MG/DL (ref 75–110)
GLUCOSE BLD-MCNC: 154 MG/DL (ref 70–99)
GLUCOSE BLD-MCNC: 167 MG/DL (ref 75–110)
GLUCOSE BLD-MCNC: 180 MG/DL
GLUCOSE BLD-MCNC: 180 MG/DL (ref 75–110)
GLUCOSE BLD-MCNC: 229 MG/DL (ref 75–110)
GLUCOSE BLD-MCNC: 237 MG/DL (ref 75–110)
GLUCOSE URINE: NEGATIVE
HCT VFR BLD CALC: 37 % (ref 41–53)
HEMOGLOBIN: 12.6 G/DL (ref 13.5–17.5)
IMMATURE GRANULOCYTES: ABNORMAL %
KETONES, URINE: ABNORMAL
LACTIC ACID: 0.8 MMOL/L (ref 0.5–2.2)
LEUKOCYTE ESTERASE, URINE: NEGATIVE
LYMPHOCYTES # BLD: 11 % (ref 24–44)
MAGNESIUM: 1.5 MG/DL (ref 1.6–2.6)
MCH RBC QN AUTO: 32.7 PG (ref 26–34)
MCHC RBC AUTO-ENTMCNC: 34.1 G/DL (ref 31–37)
MCV RBC AUTO: 95.9 FL (ref 80–100)
MONOCYTES # BLD: 3 % (ref 1–7)
MUCUS: ABNORMAL
MYOGLOBIN: 117 NG/ML (ref 28–72)
NITRITE, URINE: NEGATIVE
NRBC AUTOMATED: ABNORMAL PER 100 WBC
OTHER OBSERVATIONS UA: ABNORMAL
PDW BLD-RTO: 14.4 % (ref 11.5–14.5)
PH UA: 6 (ref 5–8)
PHOSPHORUS: 3.3 MG/DL (ref 2.5–4.5)
PLATELET # BLD: 178 K/UL (ref 130–400)
PLATELET ESTIMATE: ABNORMAL
PMV BLD AUTO: 6.9 FL (ref 6–12)
POTASSIUM SERPL-SCNC: 3.8 MMOL/L (ref 3.7–5.3)
PROTEIN UA: ABNORMAL
RBC # BLD: 3.85 M/UL (ref 4.5–5.9)
RBC # BLD: ABNORMAL 10*6/UL
RBC UA: ABNORMAL /HPF (ref 0–2)
RENAL EPITHELIAL, UA: ABNORMAL /HPF
SEG NEUTROPHILS: 86 % (ref 36–66)
SEGMENTED NEUTROPHILS ABSOLUTE COUNT: 4.7 K/UL (ref 1.8–7.7)
SERUM OSMOLALITY: 284 MOSM/KG (ref 282–298)
SODIUM BLD-SCNC: 137 MMOL/L (ref 135–144)
SPECIFIC GRAVITY UA: 1.02 (ref 1–1.03)
TOTAL CK: 1387 U/L (ref 39–308)
TRICHOMONAS: ABNORMAL
TROPONIN INTERP: ABNORMAL
TROPONIN T: <0.03 NG/ML
TURBIDITY: ABNORMAL
URINE HGB: ABNORMAL
UROBILINOGEN, URINE: NORMAL
WBC # BLD: 5.5 K/UL (ref 3.5–11)
WBC # BLD: ABNORMAL 10*3/UL
WBC UA: ABNORMAL /HPF (ref 0–5)
YEAST: ABNORMAL

## 2018-03-22 PROCEDURE — 83930 ASSAY OF BLOOD OSMOLALITY: CPT

## 2018-03-22 PROCEDURE — 85025 COMPLETE CBC W/AUTO DIFF WBC: CPT

## 2018-03-22 PROCEDURE — 2580000003 HC RX 258: Performed by: INTERNAL MEDICINE

## 2018-03-22 PROCEDURE — 84484 ASSAY OF TROPONIN QUANT: CPT

## 2018-03-22 PROCEDURE — 80048 BASIC METABOLIC PNL TOTAL CA: CPT

## 2018-03-22 PROCEDURE — 83735 ASSAY OF MAGNESIUM: CPT

## 2018-03-22 PROCEDURE — 96375 TX/PRO/DX INJ NEW DRUG ADDON: CPT

## 2018-03-22 PROCEDURE — 83605 ASSAY OF LACTIC ACID: CPT

## 2018-03-22 PROCEDURE — 81003 URINALYSIS AUTO W/O SCOPE: CPT

## 2018-03-22 PROCEDURE — 96361 HYDRATE IV INFUSION ADD-ON: CPT

## 2018-03-22 PROCEDURE — 82947 ASSAY GLUCOSE BLOOD QUANT: CPT

## 2018-03-22 PROCEDURE — 83874 ASSAY OF MYOGLOBIN: CPT

## 2018-03-22 PROCEDURE — 87086 URINE CULTURE/COLONY COUNT: CPT

## 2018-03-22 PROCEDURE — 6360000002 HC RX W HCPCS: Performed by: EMERGENCY MEDICINE

## 2018-03-22 PROCEDURE — 84100 ASSAY OF PHOSPHORUS: CPT

## 2018-03-22 PROCEDURE — 2500000003 HC RX 250 WO HCPCS: Performed by: INTERNAL MEDICINE

## 2018-03-22 PROCEDURE — 82553 CREATINE MB FRACTION: CPT

## 2018-03-22 PROCEDURE — 99284 EMERGENCY DEPT VISIT MOD MDM: CPT

## 2018-03-22 PROCEDURE — C9113 INJ PANTOPRAZOLE SODIUM, VIA: HCPCS | Performed by: EMERGENCY MEDICINE

## 2018-03-22 PROCEDURE — 82010 KETONE BODYS QUAN: CPT

## 2018-03-22 PROCEDURE — 96374 THER/PROPH/DIAG INJ IV PUSH: CPT

## 2018-03-22 PROCEDURE — 82550 ASSAY OF CK (CPK): CPT

## 2018-03-22 PROCEDURE — 1200000000 HC SEMI PRIVATE

## 2018-03-22 PROCEDURE — 6360000002 HC RX W HCPCS: Performed by: INTERNAL MEDICINE

## 2018-03-22 PROCEDURE — 81001 URINALYSIS AUTO W/SCOPE: CPT

## 2018-03-22 PROCEDURE — 86140 C-REACTIVE PROTEIN: CPT

## 2018-03-22 PROCEDURE — 74022 RADEX COMPL AQT ABD SERIES: CPT

## 2018-03-22 PROCEDURE — 6370000000 HC RX 637 (ALT 250 FOR IP): Performed by: INTERNAL MEDICINE

## 2018-03-22 PROCEDURE — 2580000003 HC RX 258: Performed by: EMERGENCY MEDICINE

## 2018-03-22 PROCEDURE — 99222 1ST HOSP IP/OBS MODERATE 55: CPT | Performed by: INTERNAL MEDICINE

## 2018-03-22 RX ORDER — HYDROCODONE BITARTRATE AND ACETAMINOPHEN 5; 325 MG/1; MG/1
1 TABLET ORAL EVERY 4 HOURS PRN
Status: DISCONTINUED | OUTPATIENT
Start: 2018-03-22 | End: 2018-03-23 | Stop reason: HOSPADM

## 2018-03-22 RX ORDER — ACETAMINOPHEN 325 MG/1
650 TABLET ORAL EVERY 4 HOURS PRN
Status: DISCONTINUED | OUTPATIENT
Start: 2018-03-22 | End: 2018-03-23 | Stop reason: HOSPADM

## 2018-03-22 RX ORDER — 0.9 % SODIUM CHLORIDE 0.9 %
2000 INTRAVENOUS SOLUTION INTRAVENOUS ONCE
Status: COMPLETED | OUTPATIENT
Start: 2018-03-22 | End: 2018-03-22

## 2018-03-22 RX ORDER — DEXTROSE MONOHYDRATE 25 G/50ML
12.5 INJECTION, SOLUTION INTRAVENOUS PRN
Status: DISCONTINUED | OUTPATIENT
Start: 2018-03-22 | End: 2018-03-23 | Stop reason: HOSPADM

## 2018-03-22 RX ORDER — IBUPROFEN 800 MG/1
800 TABLET ORAL EVERY 8 HOURS PRN
Status: DISCONTINUED | OUTPATIENT
Start: 2018-03-22 | End: 2018-03-23 | Stop reason: HOSPADM

## 2018-03-22 RX ORDER — MAGNESIUM SULFATE 1 G/100ML
1 INJECTION INTRAVENOUS ONCE
Status: COMPLETED | OUTPATIENT
Start: 2018-03-22 | End: 2018-03-22

## 2018-03-22 RX ORDER — ONDANSETRON 2 MG/ML
4 INJECTION INTRAMUSCULAR; INTRAVENOUS EVERY 6 HOURS PRN
Status: DISCONTINUED | OUTPATIENT
Start: 2018-03-22 | End: 2018-03-22

## 2018-03-22 RX ORDER — MORPHINE SULFATE 4 MG/ML
4 INJECTION, SOLUTION INTRAMUSCULAR; INTRAVENOUS
Status: DISCONTINUED | OUTPATIENT
Start: 2018-03-22 | End: 2018-03-23 | Stop reason: HOSPADM

## 2018-03-22 RX ORDER — BENZONATATE 100 MG/1
200 CAPSULE ORAL 3 TIMES DAILY PRN
Status: DISCONTINUED | OUTPATIENT
Start: 2018-03-22 | End: 2018-03-23 | Stop reason: HOSPADM

## 2018-03-22 RX ORDER — ONDANSETRON 2 MG/ML
4 INJECTION INTRAMUSCULAR; INTRAVENOUS EVERY 4 HOURS PRN
Status: DISCONTINUED | OUTPATIENT
Start: 2018-03-22 | End: 2018-03-23 | Stop reason: HOSPADM

## 2018-03-22 RX ORDER — NICOTINE POLACRILEX 4 MG
15 LOZENGE BUCCAL PRN
Status: DISCONTINUED | OUTPATIENT
Start: 2018-03-22 | End: 2018-03-23 | Stop reason: HOSPADM

## 2018-03-22 RX ORDER — BISACODYL 10 MG
10 SUPPOSITORY, RECTAL RECTAL DAILY PRN
Status: DISCONTINUED | OUTPATIENT
Start: 2018-03-22 | End: 2018-03-23 | Stop reason: HOSPADM

## 2018-03-22 RX ORDER — FLUTICASONE PROPIONATE 50 MCG
1 SPRAY, SUSPENSION (ML) NASAL DAILY
Status: DISCONTINUED | OUTPATIENT
Start: 2018-03-22 | End: 2018-03-23 | Stop reason: HOSPADM

## 2018-03-22 RX ORDER — SODIUM CHLORIDE 0.9 % (FLUSH) 0.9 %
10 SYRINGE (ML) INJECTION EVERY 12 HOURS SCHEDULED
Status: DISCONTINUED | OUTPATIENT
Start: 2018-03-22 | End: 2018-03-23 | Stop reason: HOSPADM

## 2018-03-22 RX ORDER — 0.9 % SODIUM CHLORIDE 0.9 %
10 VIAL (ML) INJECTION ONCE
Status: COMPLETED | OUTPATIENT
Start: 2018-03-22 | End: 2018-03-22

## 2018-03-22 RX ORDER — ONDANSETRON 2 MG/ML
8 INJECTION INTRAMUSCULAR; INTRAVENOUS ONCE
Status: COMPLETED | OUTPATIENT
Start: 2018-03-22 | End: 2018-03-22

## 2018-03-22 RX ORDER — MORPHINE SULFATE 2 MG/ML
2 INJECTION, SOLUTION INTRAMUSCULAR; INTRAVENOUS
Status: DISCONTINUED | OUTPATIENT
Start: 2018-03-22 | End: 2018-03-23 | Stop reason: HOSPADM

## 2018-03-22 RX ORDER — DEXTROSE, SODIUM CHLORIDE, AND POTASSIUM CHLORIDE 5; .45; .15 G/100ML; G/100ML; G/100ML
INJECTION INTRAVENOUS CONTINUOUS
Status: DISCONTINUED | OUTPATIENT
Start: 2018-03-22 | End: 2018-03-23

## 2018-03-22 RX ORDER — POTASSIUM CHLORIDE 20 MEQ/1
40 TABLET, EXTENDED RELEASE ORAL PRN
Status: DISCONTINUED | OUTPATIENT
Start: 2018-03-22 | End: 2018-03-23 | Stop reason: HOSPADM

## 2018-03-22 RX ORDER — POTASSIUM CHLORIDE 7.45 MG/ML
10 INJECTION INTRAVENOUS PRN
Status: DISCONTINUED | OUTPATIENT
Start: 2018-03-22 | End: 2018-03-23 | Stop reason: HOSPADM

## 2018-03-22 RX ORDER — NICOTINE 21 MG/24HR
1 PATCH, TRANSDERMAL 24 HOURS TRANSDERMAL DAILY PRN
Status: DISCONTINUED | OUTPATIENT
Start: 2018-03-22 | End: 2018-03-23 | Stop reason: HOSPADM

## 2018-03-22 RX ORDER — POTASSIUM CHLORIDE 20MEQ/15ML
40 LIQUID (ML) ORAL PRN
Status: DISCONTINUED | OUTPATIENT
Start: 2018-03-22 | End: 2018-03-23 | Stop reason: HOSPADM

## 2018-03-22 RX ORDER — SODIUM CHLORIDE 9 MG/ML
INJECTION, SOLUTION INTRAVENOUS CONTINUOUS
Status: DISCONTINUED | OUTPATIENT
Start: 2018-03-22 | End: 2018-03-22

## 2018-03-22 RX ORDER — PANTOPRAZOLE SODIUM 20 MG/1
20 TABLET, DELAYED RELEASE ORAL DAILY
Status: DISCONTINUED | OUTPATIENT
Start: 2018-03-22 | End: 2018-03-23 | Stop reason: HOSPADM

## 2018-03-22 RX ORDER — PANTOPRAZOLE SODIUM 40 MG/10ML
40 INJECTION, POWDER, LYOPHILIZED, FOR SOLUTION INTRAVENOUS ONCE
Status: COMPLETED | OUTPATIENT
Start: 2018-03-22 | End: 2018-03-22

## 2018-03-22 RX ORDER — MAGNESIUM SULFATE 1 G/100ML
1 INJECTION INTRAVENOUS
Status: DISPENSED | OUTPATIENT
Start: 2018-03-22 | End: 2018-03-22

## 2018-03-22 RX ORDER — SODIUM CHLORIDE 0.9 % (FLUSH) 0.9 %
10 SYRINGE (ML) INJECTION PRN
Status: DISCONTINUED | OUTPATIENT
Start: 2018-03-22 | End: 2018-03-23 | Stop reason: HOSPADM

## 2018-03-22 RX ORDER — DEXTROSE MONOHYDRATE 50 MG/ML
100 INJECTION, SOLUTION INTRAVENOUS PRN
Status: DISCONTINUED | OUTPATIENT
Start: 2018-03-22 | End: 2018-03-23 | Stop reason: HOSPADM

## 2018-03-22 RX ORDER — MAGNESIUM SULFATE 1 G/100ML
1 INJECTION INTRAVENOUS PRN
Status: DISCONTINUED | OUTPATIENT
Start: 2018-03-22 | End: 2018-03-23 | Stop reason: HOSPADM

## 2018-03-22 RX ADMIN — INSULIN LISPRO 10 UNITS: 100 INJECTION, SUSPENSION SUBCUTANEOUS at 13:24

## 2018-03-22 RX ADMIN — INSULIN LISPRO 2 UNITS: 100 INJECTION, SOLUTION INTRAVENOUS; SUBCUTANEOUS at 17:56

## 2018-03-22 RX ADMIN — HYDROMORPHONE HYDROCHLORIDE 1 MG: 1 INJECTION, SOLUTION INTRAMUSCULAR; INTRAVENOUS; SUBCUTANEOUS at 06:30

## 2018-03-22 RX ADMIN — Medication 10 ML: at 10:49

## 2018-03-22 RX ADMIN — SODIUM CHLORIDE 2000 ML: 9 INJECTION, SOLUTION INTRAVENOUS at 06:54

## 2018-03-22 RX ADMIN — POTASSIUM CHLORIDE, DEXTROSE MONOHYDRATE AND SODIUM CHLORIDE: 150; 5; 450 INJECTION, SOLUTION INTRAVENOUS at 15:50

## 2018-03-22 RX ADMIN — ONDANSETRON 4 MG: 2 INJECTION INTRAMUSCULAR; INTRAVENOUS at 10:53

## 2018-03-22 RX ADMIN — PANTOPRAZOLE SODIUM 40 MG: 40 INJECTION, POWDER, FOR SOLUTION INTRAVENOUS at 06:30

## 2018-03-22 RX ADMIN — INSULIN LISPRO 2 UNITS: 100 INJECTION, SOLUTION INTRAVENOUS; SUBCUTANEOUS at 21:04

## 2018-03-22 RX ADMIN — MAGNESIUM SULFATE HEPTAHYDRATE 1 G: 1 INJECTION, SOLUTION INTRAVENOUS at 10:49

## 2018-03-22 RX ADMIN — POTASSIUM CHLORIDE, DEXTROSE MONOHYDRATE AND SODIUM CHLORIDE: 150; 5; 450 INJECTION, SOLUTION INTRAVENOUS at 22:57

## 2018-03-22 RX ADMIN — ONDANSETRON HYDROCHLORIDE 8 MG: 2 INJECTION, SOLUTION INTRAVENOUS at 06:30

## 2018-03-22 RX ADMIN — SODIUM CHLORIDE 10 ML: 9 INJECTION, SOLUTION INTRAMUSCULAR; INTRAVENOUS; SUBCUTANEOUS at 06:55

## 2018-03-22 RX ADMIN — SODIUM CHLORIDE: 9 INJECTION, SOLUTION INTRAVENOUS at 10:48

## 2018-03-22 RX ADMIN — INSULIN LISPRO 4 UNITS: 100 INJECTION, SOLUTION INTRAVENOUS; SUBCUTANEOUS at 13:23

## 2018-03-22 RX ADMIN — HYDROCODONE BITARTRATE AND ACETAMINOPHEN 1 TABLET: 5; 325 TABLET ORAL at 19:36

## 2018-03-22 RX ADMIN — MAGNESIUM SULFATE HEPTAHYDRATE 1 G: 1 INJECTION, SOLUTION INTRAVENOUS at 08:51

## 2018-03-22 ASSESSMENT — ENCOUNTER SYMPTOMS
NAUSEA: 1
VOMITING: 1
ABDOMINAL PAIN: 1

## 2018-03-22 ASSESSMENT — PAIN DESCRIPTION - ORIENTATION
ORIENTATION: MID
ORIENTATION: MID

## 2018-03-22 ASSESSMENT — PAIN SCALES - GENERAL
PAINLEVEL_OUTOF10: 10
PAINLEVEL_OUTOF10: 7
PAINLEVEL_OUTOF10: 8
PAINLEVEL_OUTOF10: 10

## 2018-03-22 ASSESSMENT — PAIN DESCRIPTION - LOCATION
LOCATION: BACK
LOCATION: ABDOMEN;CHEST;GENERALIZED
LOCATION: ABDOMEN

## 2018-03-22 ASSESSMENT — PAIN DESCRIPTION - PAIN TYPE
TYPE: ACUTE PAIN
TYPE: ACUTE PAIN

## 2018-03-22 ASSESSMENT — PAIN DESCRIPTION - ONSET
ONSET: ON-GOING
ONSET: ON-GOING

## 2018-03-22 ASSESSMENT — PAIN DESCRIPTION - DESCRIPTORS
DESCRIPTORS: SHARP
DESCRIPTORS: ACHING

## 2018-03-22 ASSESSMENT — PAIN DESCRIPTION - FREQUENCY
FREQUENCY: INTERMITTENT
FREQUENCY: INTERMITTENT

## 2018-03-22 NOTE — FLOWSHEET NOTE
Patient resting in bed. He engages briefly with writer, stating that he \"doesn't need anything. \" Ashtyn Reyes offers brief conversation, and encourages patient to have Spiritual Care paged, soul he wish to speak with a .  Patient expresses understanding.     03/22/18 1549   Encounter Summary   Services provided to: Patient   Referral/Consult From: Hakeem Tanner Visiting (3/22/18)   Complexity of Encounter Low   Length of Encounter 15 minutes   Spiritual Assessment Completed Yes   Routine   Type Initial   Assessment Passive   Intervention Active listening   Outcome Engaged in conversation

## 2018-03-22 NOTE — ED PROVIDER NOTES
[address] [place] [address] ED  eMERGENCY dEPARTMENT eNCOUnter      Pt Name: Lola Izaguirre  MRN: [id number]  [place] 1990  Date of evaluation: 3/22/2018  Provider: Marlen Bowles MD    [address] [place] [address]       Chief Complaint   Patient presents with    Generalized Body Aches    Nausea         HISTORY OF PRESENT ILLNESS  (Location/Symptom, Timing/Onset, Context/Setting, Quality, Duration, Modifying Factors, Severity.)   Lola Izaguirre is a 32 y.o. male who presents to the emergency department For reevaluation of abdominal pain, chest pain, nausea and vomiting. Patient is a type I diabetic. He has not been in DKA for several years. Patient became ill over the last 24 hours. He was evaluated at this facility yesterday. Influenza was negative. Laboratory studies reflected likely viral syndrome. Patient was treated symptomatically and released. He states tonight his symptoms have continued to worsen. He endorses increasing pain and sustained vomiting. He is tried to take ibuprofen. He is swallowed this however on an empty stomach. He did vomit some of the ibuprofen doses. He presents due to increasing discomfort and inability to tolerate oral intake. Nursing Notes were reviewed. ALLERGIES     Patient has no known allergies.     CURRENT MEDICATIONS       Previous Medications    BENZONATATE (TESSALON) 200 MG CAPSULE    Take 1 capsule by mouth 3 times daily as needed for Cough    FLUTICASONE (FLONASE) 50 MCG/ACT NASAL SPRAY    1 spray by Nasal route daily    IBUPROFEN (ADVIL;MOTRIN) 800 MG TABLET    Take 1 tablet by mouth every 8 hours as needed for Pain    IBUPROFEN (ADVIL;MOTRIN) 800 MG TABLET    Take 1 tablet by mouth every 8 hours as needed for Pain or Fever    INSULIN LISPRO PROT & LISPRO (HUMALOG MIX 50/50 KWIKPEN) (50-50) 100 UNIT PER ML SUPN INJECTION PEN    Inject 10 Units into the skin 3 times daily (with meals)    INSULIN LISPRO PROT & LISPRO (HUMALOG MIX 50/50) (50-50) 100 UNIT PER ML SUSP Negative. Except as noted above the remainder of the review of systems was reviewed and negative. PHYSICAL EXAM    (up to 7 for level 4, 8 or more for level 5)     Vitals:    03/22/18 0621   BP: (!) 155/80   Pulse: 92   Resp: 18   Temp: 99.6 °F (37.6 °C)   TempSrc: Oral   SpO2: 96%   Weight: 160 lb (72.6 kg)   Height: 5' 6\" (1.676 m)       Physical exam reflects an uncomfortable appearing male. He is afebrile. Vital signs stable to include pulse ox of 98% on room air. He is not hypoxic. He is alert and conversive. He does appear weak but he is appropriate in behavior. Integument demonstrates decreased turgor. Mucous membranes are quite dry. Oropharyngeal exam without lesion. No cervical lymphadenopathy. Neck soft and supple no meningismus. Trachea is midline. No stridor. No difficulty breathing speaking or swallowing. Heart regular rate and rhythm normal S1 and S2 no murmurs rubs or gallops. Lungs are clear to auscultation without wheezes rales or rhonchi. Abdomen is soft although he endorses a diffuse discomfort to palpation. Bowel sounds are diminished. Extremities show no gross abnormality. Integument without rash or lesion. No neurovascular deficits are noted      DIAGNOSTIC RESULTS       RADIOLOGY:   Non-plain film images such as CT, Ultrasound and MRI are read by the radiologist. Plain radiographic images are visualized and preliminarily interpreted by the emergency physician with the below findings:    pending    Interpretation per the Radiologist below, if available at the time of this note:    XR Acute Abd Series Chest 1 VW    (Results Pending)         LABS:  Labs Reviewed   URINE CULTURE   BASIC METABOLIC PANEL   CBC WITH AUTO DIFFERENTIAL   BETA-HYDROXYBUTYRATE   MAGNESIUM   PHOSPHORUS   URINALYSIS   OSMOLALITY   LACTIC ACID   POCT GLUCOSE     pending      All other labs were within normal range or not returned as of this dictation.     EMERGENCY DEPARTMENT COURSE and DIFFERENTIAL DIAGNOSIS/MDM:   Vitals:    Vitals:    03/22/18 0621   BP: (!) 155/80   Pulse: 92   Resp: 18   Temp: 99.6 °F (37.6 °C)   TempSrc: Oral   SpO2: 96%   Weight: 160 lb (72.6 kg)   Height: 5' 6\" (1.676 m)     Patient is evaluated. He is clinically quite dry. IV access established. Initial IV fluids are ordered as are symptomatic medications for his nausea likely NSAID aggravated gastritis symptoms and discomfort. Laboratory studies are requested as are plain films initially. Care is turned over to Dr. Kvng Ogden at shift change for review of investigations, reevaluation patient's status, additional care and ultimate disposition    CONSULTS:  None    PROCEDURES:  None    FINAL IMPRESSION      1. Dehydration    2. Nausea and vomiting, intractability of vomiting not specified, unspecified vomiting type    3. Generalized abdominal pain    4. NSAID induced gastritis    5. History of type 1 diabetes mellitus          DISPOSITION/PLAN   DISPOSITION    Disposition per Dr Kyra Pressley:   No follow-up provider specified. DISCHARGE MEDICATIONS:     New Prescriptions    No medications on file     Controlled Substances Monitoring: The Prescription Monitoring Report for this patient was reviewed today.  Kamini Segal MD)    No signs of potential drug abuse or diversion identified. Kamini Segal MD)                   (Please note that portions of this note were completed with a voice recognition program.  Efforts were made to edit the dictations but occasionally words are mis-transcribed.)    Kamini Segal MD  Attending Emergency Physician         Kamini Segal MD  03/22/18 3574

## 2018-03-22 NOTE — H&P
Glucose Fingerstick    Collection Time: 03/22/18  6:27 AM   Result Value Ref Range    POC Glucose 144 (H) 75 - 110 mg/dL   Basic Metabolic Panel    Collection Time: 03/22/18  6:30 AM   Result Value Ref Range    Glucose 154 (H) 70 - 99 mg/dL    BUN 14 6 - 20 mg/dL    CREATININE 1.12 0.70 - 1.20 mg/dL    Bun/Cre Ratio 13 9 - 20    Calcium 8.7 8.6 - 10.4 mg/dL    Sodium 137 135 - 144 mmol/L    Potassium 3.8 3.7 - 5.3 mmol/L    Chloride 99 98 - 107 mmol/L    CO2 22 20 - 31 mmol/L    Anion Gap 16 9 - 17 mmol/L    GFR Non-African American >60 >60 mL/min    GFR African American >60 >60 mL/min    GFR Comment          GFR Staging NOT REPORTED    CBC Auto Differential    Collection Time: 03/22/18  6:30 AM   Result Value Ref Range    WBC 5.5 3.5 - 11.0 k/uL    RBC 3.85 (L) 4.5 - 5.9 m/uL    Hemoglobin 12.6 (L) 13.5 - 17.5 g/dL    Hematocrit 37.0 (L) 41 - 53 %    MCV 95.9 80 - 100 fL    MCH 32.7 26 - 34 pg    MCHC 34.1 31 - 37 g/dL    RDW 14.4 11.5 - 14.5 %    Platelets 695 809 - 354 k/uL    MPV 6.9 6.0 - 12.0 fL    NRBC Automated NOT REPORTED per 100 WBC    Differential Type NOT REPORTED     Seg Neutrophils 86 (H) 36 - 66 %    Lymphocytes 11 (L) 24 - 44 %    Monocytes 3 1 - 7 %    Eosinophils % 0 (L) 1 - 4 %    Basophils 0 0 - 2 %    Immature Granulocytes NOT REPORTED 0 %    Segs Absolute 4.70 1.8 - 7.7 k/uL    Absolute Lymph # 0.60 (L) 1.0 - 4.8 k/uL    Absolute Mono # 0.20 0.2 - 0.8 k/uL    Absolute Eos # 0.00 0.0 - 0.4 k/uL    Basophils # 0.00 0.0 - 0.2 k/uL    Absolute Immature Granulocyte NOT REPORTED 0.00 - 0.30 k/uL    WBC Morphology NOT REPORTED     RBC Morphology NOT REPORTED     Platelet Estimate NOT REPORTED    Beta-Hydroxybutyrate    Collection Time: 03/22/18  6:30 AM   Result Value Ref Range    Beta-Hydroxybutyrate 1.48 (H) 0.02 - 0.27 mmol/L   Magnesium    Collection Time: 03/22/18  6:30 AM   Result Value Ref Range    Magnesium 1.5 (L) 1.6 - 2.6 mg/dL   Phosphorus    Collection Time: 03/22/18  6:30 AM   Result Value Ref Range    Phosphorus 3.3 2.5 - 4.5 mg/dL   Osmolality    Collection Time: 03/22/18  6:30 AM   Result Value Ref Range    Serum Osmolality 284 282 - 298 mOsm/kg   Lactic Acid    Collection Time: 03/22/18  6:30 AM   Result Value Ref Range    Lactic Acid 0.8 0.5 - 2.2 mmol/L   CK isoenzymes    Collection Time: 03/22/18  6:30 AM   Result Value Ref Range    Total CK 1,387 (H) 39 - 308 U/L    CK-MB 2.8 <10.5 ng/mL    % CKMB 0.2 0.0 - 3.5 %    CKMB Interpretation COMPATIBLE WITH SKELETAL MUSCLE ORIGIN    C-Reactive Protein    Collection Time: 03/22/18  6:30 AM   Result Value Ref Range    CRP 20.5 (H) 0.0 - 5.0 mg/L   TROP/MYOGLOBIN    Collection Time: 03/22/18  6:30 AM   Result Value Ref Range    Troponin T <0.03 <0.03 ng/mL    Troponin Interp          Myoglobin 117 (H) 28 - 72 ng/mL   Urinalysis    Collection Time: 03/22/18  8:17 AM   Result Value Ref Range    Color, UA YELLOW YEL    Turbidity UA SLIGHTLY CLOUDY (A) CLEAR    Glucose, Ur NEGATIVE NEG    Bilirubin Urine NEGATIVE NEG    Ketones, Urine 2+ (A) NEG    Specific Gravity, UA 1.022 1.005 - 1.030    Urine Hgb 3+ (A) NEG    pH, UA 6.0 5.0 - 8.0    Protein, UA 2+ (A) NEG    Urobilinogen, Urine Normal NORM    Nitrite, Urine NEGATIVE NEG    Leukocyte Esterase, Urine NEGATIVE NEG    Urinalysis Comments NOT REPORTED    Microscopic Urinalysis    Collection Time: 03/22/18  8:17 AM   Result Value Ref Range    -          WBC, UA 0 TO 2 0 - 5 /HPF    RBC, UA 20 TO 50 0 - 2 /HPF    Casts UA NOT REPORTED /LPF    Crystals UA NOT REPORTED NONE /HPF    Epithelial Cells UA 2 TO 5 0 - 5 /HPF    Renal Epithelial, Urine NOT REPORTED 0 /HPF    Bacteria, UA NOT REPORTED NONE    Mucus, UA 1+ (A) NONE    Trichomonas, UA NOT REPORTED NONE    Amorphous, UA NOT REPORTED NONE    Other Observations UA NOT REPORTED NREQ    Yeast, UA NOT REPORTED NONE   POC Glucose Fingerstick    Collection Time: 03/22/18  8:58 AM   Result Value Ref Range    POC Glucose 180 (H) 75 - 110 mg/dL   POCT

## 2018-03-23 VITALS
BODY MASS INDEX: 25.92 KG/M2 | SYSTOLIC BLOOD PRESSURE: 144 MMHG | RESPIRATION RATE: 14 BRPM | OXYGEN SATURATION: 95 % | TEMPERATURE: 99.1 F | HEART RATE: 77 BPM | WEIGHT: 161.3 LBS | DIASTOLIC BLOOD PRESSURE: 80 MMHG | HEIGHT: 66 IN

## 2018-03-23 LAB
ANION GAP SERPL CALCULATED.3IONS-SCNC: 19 MMOL/L (ref 9–17)
BUN BLDV-MCNC: 15 MG/DL (ref 6–20)
BUN/CREAT BLD: 13 (ref 9–20)
CALCIUM SERPL-MCNC: 8.2 MG/DL (ref 8.6–10.4)
CHLORIDE BLD-SCNC: 95 MMOL/L (ref 98–107)
CO2: 18 MMOL/L (ref 20–31)
CREAT SERPL-MCNC: 1.13 MG/DL (ref 0.7–1.2)
CULTURE: NO GROWTH
CULTURE: NORMAL
GFR AFRICAN AMERICAN: >60 ML/MIN
GFR NON-AFRICAN AMERICAN: >60 ML/MIN
GFR SERPL CREATININE-BSD FRML MDRD: ABNORMAL ML/MIN/{1.73_M2}
GFR SERPL CREATININE-BSD FRML MDRD: ABNORMAL ML/MIN/{1.73_M2}
GLUCOSE BLD-MCNC: 179 MG/DL (ref 75–110)
GLUCOSE BLD-MCNC: 405 MG/DL (ref 75–110)
GLUCOSE BLD-MCNC: 443 MG/DL (ref 70–99)
HCT VFR BLD CALC: 38.9 % (ref 41–53)
HEMOGLOBIN: 12.9 G/DL (ref 13.5–17.5)
INR BLD: 1
Lab: NORMAL
MCH RBC QN AUTO: 32.1 PG (ref 26–34)
MCHC RBC AUTO-ENTMCNC: 33.2 G/DL (ref 31–37)
MCV RBC AUTO: 96.8 FL (ref 80–100)
MYOGLOBIN: 62 NG/ML (ref 28–72)
NRBC AUTOMATED: ABNORMAL PER 100 WBC
PDW BLD-RTO: 14.1 % (ref 11.5–14.5)
PLATELET # BLD: 163 K/UL (ref 130–400)
PMV BLD AUTO: 7.2 FL (ref 6–12)
POTASSIUM SERPL-SCNC: 4.6 MMOL/L (ref 3.7–5.3)
PROTHROMBIN TIME: 9.9 SEC (ref 9.7–11.6)
RBC # BLD: 4.02 M/UL (ref 4.5–5.9)
SODIUM BLD-SCNC: 132 MMOL/L (ref 135–144)
SPECIMEN DESCRIPTION: NORMAL
SPECIMEN DESCRIPTION: NORMAL
STATUS: NORMAL
TOTAL CK: 970 U/L (ref 39–308)
WBC # BLD: 4.4 K/UL (ref 3.5–11)

## 2018-03-23 PROCEDURE — 99232 SBSQ HOSP IP/OBS MODERATE 35: CPT | Performed by: INTERNAL MEDICINE

## 2018-03-23 PROCEDURE — 82550 ASSAY OF CK (CPK): CPT

## 2018-03-23 PROCEDURE — 6370000000 HC RX 637 (ALT 250 FOR IP): Performed by: INTERNAL MEDICINE

## 2018-03-23 PROCEDURE — 85610 PROTHROMBIN TIME: CPT

## 2018-03-23 PROCEDURE — 85027 COMPLETE CBC AUTOMATED: CPT

## 2018-03-23 PROCEDURE — 36415 COLL VENOUS BLD VENIPUNCTURE: CPT

## 2018-03-23 PROCEDURE — 82947 ASSAY GLUCOSE BLOOD QUANT: CPT

## 2018-03-23 PROCEDURE — 6360000002 HC RX W HCPCS: Performed by: INTERNAL MEDICINE

## 2018-03-23 PROCEDURE — 2580000003 HC RX 258: Performed by: INTERNAL MEDICINE

## 2018-03-23 PROCEDURE — 83874 ASSAY OF MYOGLOBIN: CPT

## 2018-03-23 PROCEDURE — 80048 BASIC METABOLIC PNL TOTAL CA: CPT

## 2018-03-23 RX ORDER — SODIUM CHLORIDE 9 MG/ML
INJECTION, SOLUTION INTRAVENOUS CONTINUOUS
Status: DISCONTINUED | OUTPATIENT
Start: 2018-03-23 | End: 2018-03-23

## 2018-03-23 RX ADMIN — INSULIN LISPRO 10 UNITS: 100 INJECTION, SUSPENSION SUBCUTANEOUS at 12:14

## 2018-03-23 RX ADMIN — FLUTICASONE PROPIONATE 1 SPRAY: 50 SPRAY, METERED NASAL at 08:27

## 2018-03-23 RX ADMIN — Medication 10 ML: at 08:26

## 2018-03-23 RX ADMIN — PANTOPRAZOLE SODIUM 20 MG: 20 TABLET, DELAYED RELEASE ORAL at 08:27

## 2018-03-23 RX ADMIN — INSULIN LISPRO 30 UNITS: 100 INJECTION, SUSPENSION SUBCUTANEOUS at 06:47

## 2018-03-23 RX ADMIN — BENZONATATE 200 MG: 100 CAPSULE ORAL at 03:08

## 2018-03-23 RX ADMIN — SODIUM CHLORIDE: 9 INJECTION, SOLUTION INTRAVENOUS at 08:26

## 2018-03-23 RX ADMIN — INSULIN LISPRO 12 UNITS: 100 INJECTION, SOLUTION INTRAVENOUS; SUBCUTANEOUS at 09:06

## 2018-03-23 RX ADMIN — ONDANSETRON 4 MG: 2 INJECTION INTRAMUSCULAR; INTRAVENOUS at 03:43

## 2018-03-23 RX ADMIN — INSULIN LISPRO 2 UNITS: 100 INJECTION, SOLUTION INTRAVENOUS; SUBCUTANEOUS at 12:14

## 2018-03-23 RX ADMIN — HYDROCODONE BITARTRATE AND ACETAMINOPHEN 1 TABLET: 5; 325 TABLET ORAL at 03:08

## 2018-03-23 RX ADMIN — INSULIN LISPRO 10 UNITS: 100 INJECTION, SUSPENSION SUBCUTANEOUS at 09:07

## 2018-03-23 ASSESSMENT — PAIN DESCRIPTION - LOCATION: LOCATION: ABDOMEN

## 2018-03-23 ASSESSMENT — PAIN DESCRIPTION - ONSET: ONSET: ON-GOING

## 2018-03-23 ASSESSMENT — PAIN DESCRIPTION - ORIENTATION: ORIENTATION: MID

## 2018-03-23 ASSESSMENT — PAIN SCALES - GENERAL
PAINLEVEL_OUTOF10: 7
PAINLEVEL_OUTOF10: 0

## 2018-03-23 ASSESSMENT — PAIN DESCRIPTION - DESCRIPTORS: DESCRIPTORS: ACHING

## 2018-03-23 ASSESSMENT — PAIN DESCRIPTION - FREQUENCY: FREQUENCY: INTERMITTENT

## 2018-03-23 ASSESSMENT — PAIN DESCRIPTION - PAIN TYPE: TYPE: ACUTE PAIN

## 2018-03-23 NOTE — PLAN OF CARE
Problem: Safety:  Goal: Free from accidental physical injury  Free from accidental physical injury   Outcome: Ongoing      Problem: Daily Care:  Goal: Daily care needs are met  Daily care needs are met   Outcome: Ongoing      Problem: Pain:  Goal: Patient's pain/discomfort is manageable  Patient's pain/discomfort is manageable   Outcome: Ongoing      Problem: Discharge Planning:  Goal: Patients continuum of care needs are met  Patients continuum of care needs are met   Outcome: Ongoing

## 2018-03-23 NOTE — PROGRESS NOTES
[facility]    Progress Note    3/23/2018    12:43 PM    Name:   Sugey Wynn  MRN:     [id number]     Acct:      [de-identified]   Room:   2018/2018-02  IP Day:  1  Admit Date:  3/22/2018  6:14 AM    PCP:   Carlo Still MD  Code Status:  Full Code    Subjective:     C/C:   Chief Complaint   Patient presents with    Generalized Body Aches    Nausea     Interval History Status: improved. Patient has markedly improved and his body aches and nausea have resolved. He is tolerating diet and has no acute complaints. Brief History:     The patient is a 32 y.o. Non-/non  male who presents with Generalized Body Aches and Nausea   and he is admitted to the hospital for the management of  Dehydration. This is a 33 y/o male with 2-3 day history of nausea, vomiting and poor oral intake. He has generalized body aches and myalgias with this. No diarrhea, fever or chills. No treatment is been tried prior to arrival.  He denies any fevers or chills or other associated symptoms. He denies any recent sick contacts. His symptoms worsen when he tries to eat otherwise no aggravating or alleviating factors are identified. Review of Systems:     Constitutional:  negative for chills, fevers, sweats  Respiratory:  negative for cough, dyspnea on exertion, shortness of breath, wheezing  Cardiovascular:  negative for chest pain, chest pressure/discomfort, lower extremity edema, palpitations  Gastrointestinal:  negative for abdominal pain, constipation, diarrhea, nausea, vomiting  Neurological:  negative for dizziness, headache    Medications:      Allergies:  No Known Allergies    Current Meds:   Scheduled Meds:    fluticasone  1 spray Nasal Daily    insulin lispro prot & lispro  10 Units Subcutaneous TID WC    insulin lispro protamine & lispro  30 Units Subcutaneous Daily with breakfast    pantoprazole  20 mg Oral Daily    insulin lispro  0-12 Units Subcutaneous TID WC    insulin lispro  0-6 Units Subcutaneous Nightly    sodium chloride flush  10 mL Intravenous 2 times per day    enoxaparin  40 mg Subcutaneous Daily     Continuous Infusions:    sodium chloride 100 mL/hr at 18 0826    dextrose       PRN Meds: benzonatate, ibuprofen, glucose, dextrose, glucagon (rDNA), dextrose, sodium chloride flush, potassium chloride **OR** potassium chloride **OR** potassium chloride, magnesium sulfate, acetaminophen, HYDROcodone 5 mg - acetaminophen, morphine **OR** morphine, magnesium hydroxide, bisacodyl, nicotine, ondansetron    Data:     Past Medical History:   has a past medical history of Diabetes mellitus ([facility] HLD (hyperlipidemia); 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 Social History:   reports that he has been smoking Cigarettes. He has a 0.25 pack-year smoking history. He has never used smokeless tobacco. He reports that he uses drugs, including Marijuana. He reports that he does not drink alcohol. Family History:   Family History   Problem Relation Age of Onset    Diabetes Father     Other Father      PAD       Vitals:  /82   Pulse 88   Temp 98.8 °F (37.1 °C) (Oral)   Resp 18   Ht 5' 6\" (1.676 m)   Wt 161 lb 4.8 oz (73.2 kg)   SpO2 96%   BMI 26.03 kg/m²   Temp (24hrs), Av.2 °F (37.3 °C), Min:98.2 °F (36.8 °C), Max:100 °F (37.8 °C)    Recent Labs      18   1631  18   2048  18   0638  18   1149   POCGLU  167*  229*  405*  179*       I/O (24Hr):     Intake/Output Summary (Last 24 hours) at 18 1243  Last data filed at 18 1216   Gross per 24 hour   Intake             2261 ml   Output             5600 ml   Net            -3339 ml       Labs:    Hematology:  Recent Labs      18   1058  18   0630  18   0533   WBC  3.7  5.5  4.4   RBC  4.29*  3.85*  4.02*   HGB  13.8  12.6*  12.9*   HCT  41.3  37.0*  38.9*   MCV  96.2  95.9  96.8   MCH  32.2  32.7  32.1   MCHC  33.5  34.1  33.2   RDW Gastroenteritis [K52.9] 03/22/2018    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 Discontinue IV fluids  2. Advance diet  3. GI and DVT prophylaxis  4. Discharge planning. Patient is advised to monitor the color of his urine and push oral fluids over the next 24-48 hours.     Rod Gómez DO  3/23/2018  12:43 PM

## 2018-03-23 NOTE — DISCHARGE INSTR - DIET
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
## 2018-03-23 NOTE — PLAN OF CARE
Problem: Safety:  Goal: Free from accidental physical injury  Free from accidental physical injury   Outcome: Met This Shift  Patient free from injury. Goal: Free from intentional harm  Free from intentional harm   Outcome: Met This Shift  Patient free from injury. Problem: Pain:  Goal: Patient's pain/discomfort is manageable  Patient's pain/discomfort is manageable   Outcome: Ongoing  Pain level assessment complete.    Patient educated on pain scale and control interventions  PRN pain medication given per patient request  Patient instructed to call out with new onset of pain or unrelieved pain

## 2018-04-21 PROBLEM — E86.0 DEHYDRATION: Status: RESOLVED | Noted: 2018-03-22 | Resolved: 2018-04-21

## 2018-04-25 ENCOUNTER — HOSPITAL ENCOUNTER (EMERGENCY)
Age: 28
Discharge: HOME | End: 2018-04-25
Attending: EMERGENCY MEDICINE
Payer: MEDICAID

## 2018-04-25 VITALS
TEMPERATURE: 99.3 F | SYSTOLIC BLOOD PRESSURE: 149 MMHG | BODY MASS INDEX: 26.03 KG/M2 | WEIGHT: 162 LBS | DIASTOLIC BLOOD PRESSURE: 85 MMHG | RESPIRATION RATE: 18 BRPM | HEART RATE: 113 BPM | OXYGEN SATURATION: 100 % | HEIGHT: 66 IN

## 2018-04-25 DIAGNOSIS — E74.04 MCARDLE'S DISEASE (HCC): ICD-10-CM

## 2018-04-25 DIAGNOSIS — E16.2 HYPOGLYCEMIA: Primary | ICD-10-CM

## 2018-04-25 LAB
ABSOLUTE EOS #: 0.1 K/UL (ref 0–0.4)
ABSOLUTE IMMATURE GRANULOCYTE: ABNORMAL K/UL (ref 0–0.3)
ABSOLUTE LYMPH #: 0.3 K/UL (ref 1–4.8)
ABSOLUTE MONO #: 0.2 K/UL (ref 0.2–0.8)
ANION GAP SERPL CALCULATED.3IONS-SCNC: 14 MMOL/L (ref 9–17)
BASOPHILS # BLD: 0 % (ref 0–2)
BASOPHILS ABSOLUTE: 0 K/UL (ref 0–0.2)
BETA-HYDROXYBUTYRATE: 0.61 MMOL/L (ref 0.02–0.27)
BUN BLDV-MCNC: 18 MG/DL (ref 6–20)
BUN/CREAT BLD: 17 (ref 9–20)
CALCIUM SERPL-MCNC: 8.4 MG/DL (ref 8.6–10.4)
CHLORIDE BLD-SCNC: 105 MMOL/L (ref 98–107)
CHP ED QC CHECK: YES
CO2: 22 MMOL/L (ref 20–31)
CREAT SERPL-MCNC: 1.05 MG/DL (ref 0.7–1.2)
DIFFERENTIAL TYPE: ABNORMAL
EKG ATRIAL RATE: 100 BPM
EKG P AXIS: 65 DEGREES
EKG P-R INTERVAL: 128 MS
EKG Q-T INTERVAL: 330 MS
EKG QRS DURATION: 74 MS
EKG QTC CALCULATION (BAZETT): 425 MS
EKG R AXIS: 42 DEGREES
EKG T AXIS: 9 DEGREES
EKG VENTRICULAR RATE: 100 BPM
EOSINOPHILS RELATIVE PERCENT: 1 % (ref 1–4)
GFR AFRICAN AMERICAN: >60 ML/MIN
GFR NON-AFRICAN AMERICAN: >60 ML/MIN
GFR SERPL CREATININE-BSD FRML MDRD: ABNORMAL ML/MIN/{1.73_M2}
GFR SERPL CREATININE-BSD FRML MDRD: ABNORMAL ML/MIN/{1.73_M2}
GLUCOSE BLD-MCNC: 81 MG/DL (ref 70–99)
GLUCOSE BLD-MCNC: 88 MG/DL
GLUCOSE BLD-MCNC: 88 MG/DL (ref 75–110)
HCT VFR BLD CALC: 41.7 % (ref 41–53)
HEMOGLOBIN: 14.1 G/DL (ref 13.5–17.5)
IMMATURE GRANULOCYTES: ABNORMAL %
LACTIC ACID: 0.6 MMOL/L (ref 0.5–2.2)
LYMPHOCYTES # BLD: 5 % (ref 24–44)
MAGNESIUM: 1.6 MG/DL (ref 1.6–2.6)
MCH RBC QN AUTO: 32.6 PG (ref 26–34)
MCHC RBC AUTO-ENTMCNC: 33.9 G/DL (ref 31–37)
MCV RBC AUTO: 96.1 FL (ref 80–100)
MONOCYTES # BLD: 3 % (ref 1–7)
MYOGLOBIN: 108 NG/ML (ref 28–72)
NRBC AUTOMATED: ABNORMAL PER 100 WBC
PDW BLD-RTO: 14.3 % (ref 11.5–14.5)
PLATELET # BLD: 285 K/UL (ref 130–400)
PLATELET ESTIMATE: ABNORMAL
PMV BLD AUTO: 7 FL (ref 6–12)
POTASSIUM SERPL-SCNC: 4 MMOL/L (ref 3.7–5.3)
RBC # BLD: 4.34 M/UL (ref 4.5–5.9)
RBC # BLD: ABNORMAL 10*6/UL
SEG NEUTROPHILS: 91 % (ref 36–66)
SEGMENTED NEUTROPHILS ABSOLUTE COUNT: 6.5 K/UL (ref 1.8–7.7)
SODIUM BLD-SCNC: 141 MMOL/L (ref 135–144)
TOTAL CK: 556 U/L (ref 39–308)
TROPONIN INTERP: NORMAL
TROPONIN T: <0.03 NG/ML
WBC # BLD: 7.1 K/UL (ref 3.5–11)
WBC # BLD: ABNORMAL 10*3/UL

## 2018-04-25 PROCEDURE — 82947 ASSAY GLUCOSE BLOOD QUANT: CPT

## 2018-04-25 PROCEDURE — 6370000000 HC RX 637 (ALT 250 FOR IP): Performed by: EMERGENCY MEDICINE

## 2018-04-25 PROCEDURE — 80048 BASIC METABOLIC PNL TOTAL CA: CPT

## 2018-04-25 PROCEDURE — 93005 ELECTROCARDIOGRAM TRACING: CPT

## 2018-04-25 PROCEDURE — 82550 ASSAY OF CK (CPK): CPT

## 2018-04-25 PROCEDURE — 99285 EMERGENCY DEPT VISIT HI MDM: CPT

## 2018-04-25 PROCEDURE — 2580000003 HC RX 258: Performed by: EMERGENCY MEDICINE

## 2018-04-25 PROCEDURE — 85025 COMPLETE CBC W/AUTO DIFF WBC: CPT

## 2018-04-25 PROCEDURE — 82010 KETONE BODYS QUAN: CPT

## 2018-04-25 PROCEDURE — 83605 ASSAY OF LACTIC ACID: CPT

## 2018-04-25 PROCEDURE — 84484 ASSAY OF TROPONIN QUANT: CPT

## 2018-04-25 PROCEDURE — 83874 ASSAY OF MYOGLOBIN: CPT

## 2018-04-25 PROCEDURE — 83735 ASSAY OF MAGNESIUM: CPT

## 2018-04-25 RX ORDER — ASPIRIN 81 MG/1
324 TABLET, CHEWABLE ORAL ONCE
Status: COMPLETED | OUTPATIENT
Start: 2018-04-25 | End: 2018-04-25

## 2018-04-25 RX ORDER — 0.9 % SODIUM CHLORIDE 0.9 %
1000 INTRAVENOUS SOLUTION INTRAVENOUS ONCE
Status: COMPLETED | OUTPATIENT
Start: 2018-04-25 | End: 2018-04-25

## 2018-04-25 RX ORDER — DEXTROSE MONOHYDRATE 25 G/50ML
25 INJECTION, SOLUTION INTRAVENOUS ONCE
Status: COMPLETED | OUTPATIENT
Start: 2018-04-25 | End: 2018-04-25

## 2018-04-25 RX ADMIN — SODIUM CHLORIDE 1000 ML: 9 INJECTION, SOLUTION INTRAVENOUS at 13:12

## 2018-04-25 RX ADMIN — DEXTROSE MONOHYDRATE 25 G: 25 INJECTION, SOLUTION INTRAVENOUS at 13:54

## 2018-04-25 RX ADMIN — ASPIRIN 81 MG 324 MG: 81 TABLET ORAL at 13:20

## 2018-04-25 ASSESSMENT — PAIN DESCRIPTION - DESCRIPTORS: DESCRIPTORS: ACHING

## 2018-04-25 ASSESSMENT — PAIN DESCRIPTION - PAIN TYPE: TYPE: ACUTE PAIN

## 2018-04-25 ASSESSMENT — ENCOUNTER SYMPTOMS
ALLERGIC/IMMUNOLOGIC NEGATIVE: 1
VOMITING: 1
EYES NEGATIVE: 1
RESPIRATORY NEGATIVE: 1
NAUSEA: 1

## 2018-04-25 ASSESSMENT — PAIN SCALES - GENERAL: PAINLEVEL_OUTOF10: 8

## 2018-04-25 ASSESSMENT — PAIN DESCRIPTION - LOCATION: LOCATION: CHEST

## 2018-12-22 ENCOUNTER — HOSPITAL ENCOUNTER (EMERGENCY)
Age: 28
Discharge: HOME | End: 2018-12-22
Attending: EMERGENCY MEDICINE
Payer: MEDICARE

## 2018-12-22 VITALS
HEART RATE: 79 BPM | HEIGHT: 66 IN | WEIGHT: 158.5 LBS | DIASTOLIC BLOOD PRESSURE: 88 MMHG | SYSTOLIC BLOOD PRESSURE: 162 MMHG | BODY MASS INDEX: 25.47 KG/M2 | OXYGEN SATURATION: 100 % | TEMPERATURE: 98.1 F | RESPIRATION RATE: 18 BRPM

## 2018-12-22 DIAGNOSIS — Z76.0 ENCOUNTER FOR MEDICATION REFILL: Primary | ICD-10-CM

## 2018-12-22 DIAGNOSIS — R03.0 ELEVATED BLOOD PRESSURE READING: ICD-10-CM

## 2018-12-22 PROCEDURE — 99281 EMR DPT VST MAYX REQ PHY/QHP: CPT

## 2018-12-22 RX ORDER — INSULIN LISPRO 100 [IU]/ML
INJECTION, SUSPENSION SUBCUTANEOUS
Qty: 15 ML | Refills: 0 | Status: SHIPPED | OUTPATIENT
Start: 2018-12-22 | End: 2019-02-18 | Stop reason: SDUPTHER

## 2019-02-18 ENCOUNTER — OFFICE VISIT (OUTPATIENT)
Dept: FAMILY MEDICINE CLINIC | Age: 29
End: 2019-02-18
Payer: MEDICARE

## 2019-02-18 VITALS
HEART RATE: 75 BPM | TEMPERATURE: 98 F | BODY MASS INDEX: 26.31 KG/M2 | SYSTOLIC BLOOD PRESSURE: 140 MMHG | WEIGHT: 163 LBS | DIASTOLIC BLOOD PRESSURE: 100 MMHG

## 2019-02-18 DIAGNOSIS — I10 ESSENTIAL HYPERTENSION: ICD-10-CM

## 2019-02-18 DIAGNOSIS — E10.9 TYPE 1 DIABETES MELLITUS WITHOUT COMPLICATION (HCC): Primary | ICD-10-CM

## 2019-02-18 DIAGNOSIS — K21.9 GASTROESOPHAGEAL REFLUX DISEASE WITHOUT ESOPHAGITIS: ICD-10-CM

## 2019-02-18 DIAGNOSIS — E74.04 MCARDLE DISEASE (HCC): ICD-10-CM

## 2019-02-18 DIAGNOSIS — Z13.220 LIPID SCREENING: ICD-10-CM

## 2019-02-18 LAB — HBA1C MFR BLD: 9.5 %

## 2019-02-18 PROCEDURE — 83036 HEMOGLOBIN GLYCOSYLATED A1C: CPT | Performed by: HOSPITALIST

## 2019-02-18 PROCEDURE — 99211 OFF/OP EST MAY X REQ PHY/QHP: CPT | Performed by: HOSPITALIST

## 2019-02-18 PROCEDURE — 99213 OFFICE O/P EST LOW 20 MIN: CPT | Performed by: HOSPITALIST

## 2019-02-18 PROCEDURE — 4004F PT TOBACCO SCREEN RCVD TLK: CPT | Performed by: HOSPITALIST

## 2019-02-18 PROCEDURE — 3046F HEMOGLOBIN A1C LEVEL >9.0%: CPT | Performed by: HOSPITALIST

## 2019-02-18 PROCEDURE — 2022F DILAT RTA XM EVC RTNOPTHY: CPT | Performed by: HOSPITALIST

## 2019-02-18 PROCEDURE — G8419 CALC BMI OUT NRM PARAM NOF/U: HCPCS | Performed by: HOSPITALIST

## 2019-02-18 PROCEDURE — G8427 DOCREV CUR MEDS BY ELIG CLIN: HCPCS | Performed by: HOSPITALIST

## 2019-02-18 PROCEDURE — G8484 FLU IMMUNIZE NO ADMIN: HCPCS | Performed by: HOSPITALIST

## 2019-02-18 RX ORDER — PANTOPRAZOLE SODIUM 20 MG/1
20 TABLET, DELAYED RELEASE ORAL DAILY
Qty: 30 TABLET | Refills: 0 | Status: SHIPPED | OUTPATIENT
Start: 2019-02-18 | End: 2019-10-30 | Stop reason: SDUPTHER

## 2019-02-18 RX ORDER — INSULIN LISPRO 100 [IU]/ML
INJECTION, SUSPENSION SUBCUTANEOUS
Qty: 15 ML | Refills: 3 | Status: SHIPPED | OUTPATIENT
Start: 2019-02-18 | End: 2019-10-30 | Stop reason: SDUPTHER

## 2019-02-18 RX ORDER — LISINOPRIL 5 MG/1
5 TABLET ORAL DAILY
Qty: 30 TABLET | Refills: 3 | Status: SHIPPED | OUTPATIENT
Start: 2019-02-18 | End: 2019-10-30 | Stop reason: SDUPTHER

## 2019-02-18 RX ORDER — ONDANSETRON 4 MG/1
4 TABLET, ORALLY DISINTEGRATING ORAL EVERY 8 HOURS PRN
Qty: 20 TABLET | Refills: 0 | Status: SHIPPED | OUTPATIENT
Start: 2019-02-18 | End: 2019-10-30 | Stop reason: SDUPTHER

## 2019-02-18 ASSESSMENT — ENCOUNTER SYMPTOMS
SHORTNESS OF BREATH: 0
ABDOMINAL PAIN: 0
CHEST TIGHTNESS: 0
CONSTIPATION: 0
COUGH: 0
DIARRHEA: 0

## 2019-02-18 ASSESSMENT — PATIENT HEALTH QUESTIONNAIRE - PHQ9
SUM OF ALL RESPONSES TO PHQ QUESTIONS 1-9: 0
2. FEELING DOWN, DEPRESSED OR HOPELESS: 0
SUM OF ALL RESPONSES TO PHQ QUESTIONS 1-9: 0
1. LITTLE INTEREST OR PLEASURE IN DOING THINGS: 0
SUM OF ALL RESPONSES TO PHQ9 QUESTIONS 1 & 2: 0

## 2019-02-19 DIAGNOSIS — E10.9 TYPE 1 DIABETES MELLITUS WITHOUT COMPLICATION (HCC): ICD-10-CM

## 2019-02-19 RX ORDER — PEN NEEDLE, DIABETIC 29 G X1/2"
NEEDLE, DISPOSABLE MISCELLANEOUS
Qty: 100 EACH | Refills: 3 | Status: SHIPPED | OUTPATIENT
Start: 2019-02-19 | End: 2019-10-30 | Stop reason: SDUPTHER

## 2019-07-08 DIAGNOSIS — E10.9 TYPE 1 DIABETES MELLITUS WITHOUT COMPLICATION (HCC): ICD-10-CM

## 2019-07-08 NOTE — TELEPHONE ENCOUNTER
Please address the medication refill and close the encounter. If I can be of assistance, please route to the applicable pool. Thank you. Last visit: 2/18/19  Last Med refill: 5/18/19  Does patient have enough medication for 72 hours: No:     Next Visit Date:  No future appointments. Health Maintenance   Topic Date Due    Pneumococcal 0-64 years Vaccine (1 of 1 - PPSV23) 05/01/1996    Diabetic foot exam  05/01/2000    Diabetic retinal exam  05/01/2000    Varicella Vaccine (1 of 2 - 13+ 2-dose series) 05/01/2003    HIV screen  05/01/2005    Hepatitis B Vaccine (1 of 3 - Risk 3-dose series) 05/01/2009    Lipid screen  05/29/2014    Diabetic microalbuminuria test  01/24/2019    Potassium monitoring  04/25/2019    Creatinine monitoring  04/25/2019    A1C test (Diabetic or Prediabetic)  05/18/2019    DTaP/Tdap/Td vaccine (1 - Tdap) 08/18/2019 (Originally 5/1/2009)    Flu vaccine (1) 02/18/2020 (Originally 9/1/2019)       Hemoglobin A1C (%)   Date Value   02/18/2019 9.5 (A)   01/24/2018 9.0   11/29/2013 13.5 (H)             ( goal A1C is < 7)   Microalb/Crt.  Ratio (mcg/mg creat)   Date Value   01/24/2018 779 (H)     LDL Cholesterol (mg/dL)   Date Value   05/29/2013 109 (H)       (goal LDL is <100)   AST (U/L)   Date Value   02/09/2017 25     ALT (U/L)   Date Value   02/09/2017 26     BUN (mg/dL)   Date Value   04/25/2018 18     BP Readings from Last 3 Encounters:   02/18/19 (!) 140/100   12/22/18 (!) 162/88   04/25/18 (!) 149/85          (goal 120/80)    All Future Testing planned in [facility]  Lab Frequency Next Occurrence   Lipid Panel Once 02/18/2020   Microalbumin, Ur Once 02/18/2020               Patient Active Problem List:     Type 1 diabetes mellitus without complication (HCC)     Diabetic ketoacidosis without coma (HCC)     Smoking     Hypomagnesemia     HLD (hyperlipidemia)     Ketosis due to diabetes (HCC)     Nausea and vomiting in adult     McArdle disease ([address]     Gastroenteritis

## 2019-07-11 RX ORDER — INSULIN LISPRO 100 [IU]/ML
INJECTION, SUSPENSION SUBCUTANEOUS
Qty: 6 ML | Refills: 3 | Status: SHIPPED | OUTPATIENT
Start: 2019-07-11 | End: 2019-10-30 | Stop reason: SDUPTHER

## 2019-09-06 ENCOUNTER — HOSPITAL ENCOUNTER (EMERGENCY)
Age: 29
Discharge: HOME | End: 2019-09-06
Attending: EMERGENCY MEDICINE
Payer: MEDICARE

## 2019-09-06 ENCOUNTER — APPOINTMENT (OUTPATIENT)
Dept: GENERAL RADIOLOGY | Age: 29
End: 2019-09-06
Payer: MEDICARE

## 2019-09-06 VITALS
HEIGHT: 66 IN | OXYGEN SATURATION: 99 % | TEMPERATURE: 97.9 F | WEIGHT: 160 LBS | HEART RATE: 92 BPM | DIASTOLIC BLOOD PRESSURE: 90 MMHG | SYSTOLIC BLOOD PRESSURE: 140 MMHG | RESPIRATION RATE: 18 BRPM | BODY MASS INDEX: 25.71 KG/M2

## 2019-09-06 DIAGNOSIS — R06.00 DYSPNEA, UNSPECIFIED TYPE: Primary | ICD-10-CM

## 2019-09-06 LAB
ABSOLUTE EOS #: 0.06 K/UL (ref 0–0.44)
ABSOLUTE IMMATURE GRANULOCYTE: 0.02 K/UL (ref 0–0.3)
ABSOLUTE LYMPH #: 1.48 K/UL (ref 1.1–3.7)
ABSOLUTE MONO #: 0.5 K/UL (ref 0.1–1.2)
ANION GAP SERPL CALCULATED.3IONS-SCNC: 11 MMOL/L (ref 9–17)
BASOPHILS # BLD: 1 % (ref 0–2)
BASOPHILS ABSOLUTE: 0.04 K/UL (ref 0–0.2)
BUN BLDV-MCNC: 16 MG/DL (ref 6–20)
BUN/CREAT BLD: 13 (ref 9–20)
CALCIUM SERPL-MCNC: 9.5 MG/DL (ref 8.6–10.4)
CHLORIDE BLD-SCNC: 105 MMOL/L (ref 98–107)
CO2: 25 MMOL/L (ref 20–31)
CREAT SERPL-MCNC: 1.26 MG/DL (ref 0.7–1.2)
D-DIMER QUANTITATIVE: 0.43 MG/L FEU
DIFFERENTIAL TYPE: ABNORMAL
EOSINOPHILS RELATIVE PERCENT: 1 % (ref 1–4)
GFR AFRICAN AMERICAN: >60 ML/MIN
GFR NON-AFRICAN AMERICAN: >60 ML/MIN
GFR SERPL CREATININE-BSD FRML MDRD: ABNORMAL ML/MIN/{1.73_M2}
GFR SERPL CREATININE-BSD FRML MDRD: ABNORMAL ML/MIN/{1.73_M2}
GLUCOSE BLD-MCNC: 112 MG/DL (ref 75–110)
GLUCOSE BLD-MCNC: 60 MG/DL (ref 70–99)
HCT VFR BLD CALC: 38.2 % (ref 40.7–50.3)
HEMOGLOBIN: 13.2 G/DL (ref 13–17)
IMMATURE GRANULOCYTES: 0 %
LYMPHOCYTES # BLD: 20 % (ref 24–43)
MCH RBC QN AUTO: 32.5 PG (ref 25.2–33.5)
MCHC RBC AUTO-ENTMCNC: 34.6 G/DL (ref 28.4–34.8)
MCV RBC AUTO: 94.1 FL (ref 82.6–102.9)
MONOCYTES # BLD: 7 % (ref 3–12)
NRBC AUTOMATED: 0 PER 100 WBC
PDW BLD-RTO: 12.7 % (ref 11.8–14.4)
PLATELET # BLD: 217 K/UL (ref 138–453)
PLATELET ESTIMATE: ABNORMAL
PMV BLD AUTO: 8.9 FL (ref 8.1–13.5)
POTASSIUM SERPL-SCNC: 3.4 MMOL/L (ref 3.7–5.3)
RBC # BLD: 4.06 M/UL (ref 4.21–5.77)
RBC # BLD: ABNORMAL 10*6/UL
SEG NEUTROPHILS: 71 % (ref 36–65)
SEGMENTED NEUTROPHILS ABSOLUTE COUNT: 5.5 K/UL (ref 1.5–8.1)
SODIUM BLD-SCNC: 141 MMOL/L (ref 135–144)
WBC # BLD: 7.6 K/UL (ref 3.5–11.3)
WBC # BLD: ABNORMAL 10*3/UL

## 2019-09-06 PROCEDURE — 6360000002 HC RX W HCPCS: Performed by: PHYSICIAN ASSISTANT

## 2019-09-06 PROCEDURE — 93005 ELECTROCARDIOGRAM TRACING: CPT | Performed by: PHYSICIAN ASSISTANT

## 2019-09-06 PROCEDURE — 6370000000 HC RX 637 (ALT 250 FOR IP): Performed by: PHYSICIAN ASSISTANT

## 2019-09-06 PROCEDURE — 99285 EMERGENCY DEPT VISIT HI MDM: CPT

## 2019-09-06 PROCEDURE — 80048 BASIC METABOLIC PNL TOTAL CA: CPT

## 2019-09-06 PROCEDURE — 71046 X-RAY EXAM CHEST 2 VIEWS: CPT

## 2019-09-06 PROCEDURE — 36415 COLL VENOUS BLD VENIPUNCTURE: CPT

## 2019-09-06 PROCEDURE — 94640 AIRWAY INHALATION TREATMENT: CPT

## 2019-09-06 PROCEDURE — 85025 COMPLETE CBC W/AUTO DIFF WBC: CPT

## 2019-09-06 PROCEDURE — 94760 N-INVAS EAR/PLS OXIMETRY 1: CPT

## 2019-09-06 PROCEDURE — 82947 ASSAY GLUCOSE BLOOD QUANT: CPT

## 2019-09-06 PROCEDURE — 85379 FIBRIN DEGRADATION QUANT: CPT

## 2019-09-06 RX ORDER — ALBUTEROL SULFATE 90 UG/1
2 AEROSOL, METERED RESPIRATORY (INHALATION)
Status: DISCONTINUED | OUTPATIENT
Start: 2019-09-06 | End: 2019-09-06

## 2019-09-06 RX ORDER — ALBUTEROL SULFATE 2.5 MG/3ML
5 SOLUTION RESPIRATORY (INHALATION)
Status: DISCONTINUED | OUTPATIENT
Start: 2019-09-06 | End: 2019-09-06

## 2019-09-06 RX ORDER — BENZONATATE 100 MG/1
100 CAPSULE ORAL 3 TIMES DAILY PRN
Qty: 30 CAPSULE | Refills: 0 | Status: SHIPPED | OUTPATIENT
Start: 2019-09-06 | End: 2019-09-13

## 2019-09-06 RX ORDER — ONDANSETRON 4 MG/1
4 TABLET, ORALLY DISINTEGRATING ORAL ONCE
Status: COMPLETED | OUTPATIENT
Start: 2019-09-06 | End: 2019-09-06

## 2019-09-06 RX ORDER — ALBUTEROL SULFATE 90 UG/1
2 AEROSOL, METERED RESPIRATORY (INHALATION) EVERY 4 HOURS PRN
Qty: 1 INHALER | Refills: 0 | Status: ON HOLD | OUTPATIENT
Start: 2019-09-06 | End: 2021-05-25 | Stop reason: SDUPTHER

## 2019-09-06 RX ORDER — IPRATROPIUM BROMIDE AND ALBUTEROL SULFATE 2.5; .5 MG/3ML; MG/3ML
1 SOLUTION RESPIRATORY (INHALATION)
Status: DISCONTINUED | OUTPATIENT
Start: 2019-09-06 | End: 2019-09-06 | Stop reason: HOSPADM

## 2019-09-06 RX ADMIN — ALBUTEROL SULFATE 5 MG: 5 SOLUTION RESPIRATORY (INHALATION) at 19:49

## 2019-09-06 RX ADMIN — ONDANSETRON 4 MG: 4 TABLET, ORALLY DISINTEGRATING ORAL at 20:59

## 2019-09-06 ASSESSMENT — PAIN DESCRIPTION - PAIN TYPE: TYPE: ACUTE PAIN

## 2019-09-06 ASSESSMENT — PAIN SCALES - GENERAL: PAINLEVEL_OUTOF10: 10

## 2019-09-06 ASSESSMENT — PAIN DESCRIPTION - LOCATION: LOCATION: HEAD;THROAT

## 2019-09-06 ASSESSMENT — PAIN DESCRIPTION - DESCRIPTORS: DESCRIPTORS: POUNDING;PRESSURE;SHARP;STABBING

## 2019-09-06 NOTE — ED PROVIDER NOTES
benzonatate (TESSALON PERLES) 100 MG capsule Take 1 capsule by mouth 3 times daily as needed for Cough, Disp-30 capsule, R-0Print                 (Please note that portions of this note were completed with a voice recognition program.  Efforts were made to edit thedictations but occasionally words are mis-transcribed.)    DICKSON Sanders PA-C  09/07/19 [id number]

## 2019-09-06 NOTE — PROGRESS NOTES
(asthma) []  80 % or greater predicted/PB  [x]  Unable []  70% or greater predicted/PB  [x]  Unable []  51%-70% predicted/PB  [x]  Unable []  Less than 50% predicted/PB  []  Unable due to distress   FEV1 % Predicted []  Greater than 69%  [x]  Unable  []  Less than 50%-69%  [x]  Unable  []  Less than 35%-49%  [x]  Unable  []  Less than 35%  []  Unable due to distress     MDI Instruct:

## 2019-09-08 LAB
EKG ATRIAL RATE: 80 BPM
EKG P AXIS: 61 DEGREES
EKG P-R INTERVAL: 128 MS
EKG Q-T INTERVAL: 352 MS
EKG QRS DURATION: 78 MS
EKG QTC CALCULATION (BAZETT): 405 MS
EKG R AXIS: 9 DEGREES
EKG T AXIS: 33 DEGREES
EKG VENTRICULAR RATE: 80 BPM

## 2019-09-08 PROCEDURE — 93010 ELECTROCARDIOGRAM REPORT: CPT | Performed by: INTERNAL MEDICINE

## 2019-10-28 ENCOUNTER — APPOINTMENT (OUTPATIENT)
Dept: GENERAL RADIOLOGY | Age: 29
End: 2019-10-28
Payer: MEDICARE

## 2019-10-28 ENCOUNTER — HOSPITAL ENCOUNTER (EMERGENCY)
Age: 29
Discharge: HOME | End: 2019-10-28
Attending: EMERGENCY MEDICINE
Payer: MEDICARE

## 2019-10-28 VITALS
WEIGHT: 165 LBS | TEMPERATURE: 99.1 F | OXYGEN SATURATION: 98 % | DIASTOLIC BLOOD PRESSURE: 94 MMHG | HEART RATE: 79 BPM | RESPIRATION RATE: 19 BRPM | BODY MASS INDEX: 25.9 KG/M2 | HEIGHT: 67 IN | SYSTOLIC BLOOD PRESSURE: 147 MMHG

## 2019-10-28 DIAGNOSIS — B34.9 VIRAL SYNDROME: Primary | ICD-10-CM

## 2019-10-28 LAB
ABSOLUTE EOS #: 0.11 K/UL (ref 0–0.44)
ABSOLUTE IMMATURE GRANULOCYTE: <0.03 K/UL (ref 0–0.3)
ABSOLUTE LYMPH #: 1.31 K/UL (ref 1.1–3.7)
ABSOLUTE MONO #: 0.51 K/UL (ref 0.1–1.2)
ALLEN TEST: ABNORMAL
ANION GAP SERPL CALCULATED.3IONS-SCNC: 12 MMOL/L (ref 9–17)
ANION GAP: 9 MMOL/L (ref 7–16)
BASOPHILS # BLD: 1 % (ref 0–2)
BASOPHILS ABSOLUTE: 0.04 K/UL (ref 0–0.2)
BETA-HYDROXYBUTYRATE: 0.19 MMOL/L (ref 0.02–0.27)
BUN BLDV-MCNC: 19 MG/DL (ref 6–20)
BUN/CREAT BLD: ABNORMAL (ref 9–20)
CALCIUM SERPL-MCNC: 9.4 MG/DL (ref 8.6–10.4)
CHLORIDE BLD-SCNC: 103 MMOL/L (ref 98–107)
CO2: 21 MMOL/L (ref 20–31)
CREAT SERPL-MCNC: 1.14 MG/DL (ref 0.7–1.2)
DIFFERENTIAL TYPE: ABNORMAL
DIRECT EXAM: NORMAL
DIRECT EXAM: NORMAL
EOSINOPHILS RELATIVE PERCENT: 2 % (ref 1–4)
FIO2: ABNORMAL
GFR AFRICAN AMERICAN: >60 ML/MIN
GFR NON-AFRICAN AMERICAN: >60 ML/MIN
GFR NON-AFRICAN AMERICAN: >60 ML/MIN
GFR SERPL CREATININE-BSD FRML MDRD: >60 ML/MIN
GFR SERPL CREATININE-BSD FRML MDRD: ABNORMAL ML/MIN/{1.73_M2}
GFR SERPL CREATININE-BSD FRML MDRD: ABNORMAL ML/MIN/{1.73_M2}
GFR SERPL CREATININE-BSD FRML MDRD: NORMAL ML/MIN/{1.73_M2}
GLUCOSE BLD-MCNC: 185 MG/DL (ref 70–99)
GLUCOSE BLD-MCNC: 199 MG/DL (ref 74–100)
HCO3 VENOUS: 23.7 MMOL/L (ref 22–29)
HCT VFR BLD CALC: 39.1 % (ref 40.7–50.3)
HEMOGLOBIN: 13 G/DL (ref 13–17)
IMMATURE GRANULOCYTES: 0 %
LYMPHOCYTES # BLD: 22 % (ref 24–43)
Lab: NORMAL
Lab: NORMAL
MCH RBC QN AUTO: 32.3 PG (ref 25.2–33.5)
MCHC RBC AUTO-ENTMCNC: 33.2 G/DL (ref 28.4–34.8)
MCV RBC AUTO: 97 FL (ref 82.6–102.9)
MODE: ABNORMAL
MONOCYTES # BLD: 9 % (ref 3–12)
NEGATIVE BASE EXCESS, VEN: ABNORMAL (ref 0–2)
NRBC AUTOMATED: 0 PER 100 WBC
O2 DEVICE/FLOW/%: ABNORMAL
O2 SAT, VEN: 97 % (ref 60–85)
PATIENT TEMP: ABNORMAL
PCO2, VEN: 30.1 MM HG (ref 41–51)
PDW BLD-RTO: 13.1 % (ref 11.8–14.4)
PH VENOUS: 7.5 (ref 7.32–7.43)
PLATELET # BLD: 242 K/UL (ref 138–453)
PLATELET ESTIMATE: ABNORMAL
PMV BLD AUTO: 9.2 FL (ref 8.1–13.5)
PO2, VEN: 80.9 MM HG (ref 30–50)
POC CHLORIDE: 105 MMOL/L (ref 98–107)
POC CREATININE: 1.14 MG/DL (ref 0.51–1.19)
POC HEMATOCRIT: 41 % (ref 41–53)
POC HEMOGLOBIN: 13.9 G/DL (ref 13.5–17.5)
POC IONIZED CALCIUM: 1.1 MMOL/L (ref 1.15–1.33)
POC LACTIC ACID: 0.89 MMOL/L (ref 0.56–1.39)
POC PCO2 TEMP: ABNORMAL MM HG
POC PH TEMP: ABNORMAL
POC PO2 TEMP: ABNORMAL MM HG
POC POTASSIUM: 3.9 MMOL/L (ref 3.5–4.5)
POC SODIUM: 138 MMOL/L (ref 138–146)
POSITIVE BASE EXCESS, VEN: 1 (ref 0–3)
POTASSIUM SERPL-SCNC: 4.1 MMOL/L (ref 3.7–5.3)
RBC # BLD: 4.03 M/UL (ref 4.21–5.77)
RBC # BLD: ABNORMAL 10*6/UL
SAMPLE SITE: ABNORMAL
SEG NEUTROPHILS: 66 % (ref 36–65)
SEGMENTED NEUTROPHILS ABSOLUTE COUNT: 4.05 K/UL (ref 1.5–8.1)
SODIUM BLD-SCNC: 136 MMOL/L (ref 135–144)
SPECIMEN DESCRIPTION: NORMAL
SPECIMEN DESCRIPTION: NORMAL
TOTAL CO2, VENOUS: 25 MMOL/L (ref 23–30)
WBC # BLD: 6 K/UL (ref 3.5–11.3)
WBC # BLD: ABNORMAL 10*3/UL

## 2019-10-28 PROCEDURE — 82803 BLOOD GASES ANY COMBINATION: CPT

## 2019-10-28 PROCEDURE — 82947 ASSAY GLUCOSE BLOOD QUANT: CPT

## 2019-10-28 PROCEDURE — 85014 HEMATOCRIT: CPT

## 2019-10-28 PROCEDURE — 87880 STREP A ASSAY W/OPTIC: CPT

## 2019-10-28 PROCEDURE — 83605 ASSAY OF LACTIC ACID: CPT

## 2019-10-28 PROCEDURE — 6360000002 HC RX W HCPCS: Performed by: EMERGENCY MEDICINE

## 2019-10-28 PROCEDURE — 2580000003 HC RX 258: Performed by: EMERGENCY MEDICINE

## 2019-10-28 PROCEDURE — 80048 BASIC METABOLIC PNL TOTAL CA: CPT

## 2019-10-28 PROCEDURE — 87804 INFLUENZA ASSAY W/OPTIC: CPT

## 2019-10-28 PROCEDURE — 82435 ASSAY OF BLOOD CHLORIDE: CPT

## 2019-10-28 PROCEDURE — 71046 X-RAY EXAM CHEST 2 VIEWS: CPT

## 2019-10-28 PROCEDURE — 84132 ASSAY OF SERUM POTASSIUM: CPT

## 2019-10-28 PROCEDURE — 82565 ASSAY OF CREATININE: CPT

## 2019-10-28 PROCEDURE — 99283 EMERGENCY DEPT VISIT LOW MDM: CPT

## 2019-10-28 PROCEDURE — 84295 ASSAY OF SERUM SODIUM: CPT

## 2019-10-28 PROCEDURE — 82010 KETONE BODYS QUAN: CPT

## 2019-10-28 PROCEDURE — 96374 THER/PROPH/DIAG INJ IV PUSH: CPT

## 2019-10-28 PROCEDURE — 6370000000 HC RX 637 (ALT 250 FOR IP): Performed by: EMERGENCY MEDICINE

## 2019-10-28 PROCEDURE — 85025 COMPLETE CBC W/AUTO DIFF WBC: CPT

## 2019-10-28 PROCEDURE — 82330 ASSAY OF CALCIUM: CPT

## 2019-10-28 RX ORDER — ONDANSETRON 2 MG/ML
4 INJECTION INTRAMUSCULAR; INTRAVENOUS ONCE
Status: COMPLETED | OUTPATIENT
Start: 2019-10-28 | End: 2019-10-28

## 2019-10-28 RX ORDER — SODIUM CHLORIDE, SODIUM LACTATE, POTASSIUM CHLORIDE, AND CALCIUM CHLORIDE .6; .31; .03; .02 G/100ML; G/100ML; G/100ML; G/100ML
1000 INJECTION, SOLUTION INTRAVENOUS ONCE
Status: COMPLETED | OUTPATIENT
Start: 2019-10-28 | End: 2019-10-28

## 2019-10-28 RX ORDER — IBUPROFEN 800 MG/1
800 TABLET ORAL ONCE
Status: COMPLETED | OUTPATIENT
Start: 2019-10-28 | End: 2019-10-28

## 2019-10-28 RX ADMIN — ONDANSETRON 4 MG: 2 INJECTION INTRAMUSCULAR; INTRAVENOUS at 11:31

## 2019-10-28 RX ADMIN — IBUPROFEN 800 MG: 800 TABLET, FILM COATED ORAL at 12:28

## 2019-10-28 RX ADMIN — SODIUM CHLORIDE, POTASSIUM CHLORIDE, SODIUM LACTATE AND CALCIUM CHLORIDE 1000 ML: 600; 310; 30; 20 INJECTION, SOLUTION INTRAVENOUS at 11:31

## 2019-10-28 ASSESSMENT — ENCOUNTER SYMPTOMS
WHEEZING: 0
BACK PAIN: 0
VOMITING: 0
NAUSEA: 1
SHORTNESS OF BREATH: 0
COUGH: 1
ABDOMINAL PAIN: 1
DIARRHEA: 0
SORE THROAT: 0
CONSTIPATION: 0
BLOOD IN STOOL: 0

## 2019-10-28 ASSESSMENT — PAIN DESCRIPTION - DESCRIPTORS: DESCRIPTORS: PRESSURE;SORE

## 2019-10-28 ASSESSMENT — PAIN DESCRIPTION - FREQUENCY: FREQUENCY: INTERMITTENT

## 2019-10-28 ASSESSMENT — PAIN DESCRIPTION - LOCATION: LOCATION: ABDOMEN;BACK;HEAD

## 2019-10-28 ASSESSMENT — PAIN SCALES - GENERAL
PAINLEVEL_OUTOF10: 8
PAINLEVEL_OUTOF10: 8

## 2019-10-28 ASSESSMENT — PAIN DESCRIPTION - ONSET: ONSET: PROGRESSIVE

## 2019-10-28 ASSESSMENT — PAIN DESCRIPTION - PAIN TYPE: TYPE: ACUTE PAIN

## 2019-10-30 ENCOUNTER — OFFICE VISIT (OUTPATIENT)
Dept: FAMILY MEDICINE CLINIC | Age: 29
End: 2019-10-30
Payer: MEDICARE

## 2019-10-30 VITALS
TEMPERATURE: 99.7 F | DIASTOLIC BLOOD PRESSURE: 88 MMHG | WEIGHT: 157.2 LBS | BODY MASS INDEX: 24.62 KG/M2 | SYSTOLIC BLOOD PRESSURE: 150 MMHG | HEART RATE: 87 BPM

## 2019-10-30 DIAGNOSIS — E10.9 TYPE 1 DIABETES MELLITUS WITHOUT COMPLICATION (HCC): ICD-10-CM

## 2019-10-30 DIAGNOSIS — Z00.00 HEALTHCARE MAINTENANCE: ICD-10-CM

## 2019-10-30 DIAGNOSIS — K21.9 GASTROESOPHAGEAL REFLUX DISEASE WITHOUT ESOPHAGITIS: ICD-10-CM

## 2019-10-30 DIAGNOSIS — M79.662 PAIN OF LEFT CALF: ICD-10-CM

## 2019-10-30 DIAGNOSIS — J06.9 VIRAL URI: Primary | ICD-10-CM

## 2019-10-30 DIAGNOSIS — I10 ESSENTIAL HYPERTENSION: ICD-10-CM

## 2019-10-30 LAB — HBA1C MFR BLD: 9.7 %

## 2019-10-30 PROCEDURE — 99213 OFFICE O/P EST LOW 20 MIN: CPT | Performed by: STUDENT IN AN ORGANIZED HEALTH CARE EDUCATION/TRAINING PROGRAM

## 2019-10-30 PROCEDURE — 99211 OFF/OP EST MAY X REQ PHY/QHP: CPT | Performed by: FAMILY MEDICINE

## 2019-10-30 PROCEDURE — 83036 HEMOGLOBIN GLYCOSYLATED A1C: CPT | Performed by: STUDENT IN AN ORGANIZED HEALTH CARE EDUCATION/TRAINING PROGRAM

## 2019-10-30 PROCEDURE — 2022F DILAT RTA XM EVC RTNOPTHY: CPT | Performed by: STUDENT IN AN ORGANIZED HEALTH CARE EDUCATION/TRAINING PROGRAM

## 2019-10-30 PROCEDURE — 3046F HEMOGLOBIN A1C LEVEL >9.0%: CPT | Performed by: STUDENT IN AN ORGANIZED HEALTH CARE EDUCATION/TRAINING PROGRAM

## 2019-10-30 PROCEDURE — G8484 FLU IMMUNIZE NO ADMIN: HCPCS | Performed by: STUDENT IN AN ORGANIZED HEALTH CARE EDUCATION/TRAINING PROGRAM

## 2019-10-30 PROCEDURE — G8427 DOCREV CUR MEDS BY ELIG CLIN: HCPCS | Performed by: STUDENT IN AN ORGANIZED HEALTH CARE EDUCATION/TRAINING PROGRAM

## 2019-10-30 PROCEDURE — G8420 CALC BMI NORM PARAMETERS: HCPCS | Performed by: STUDENT IN AN ORGANIZED HEALTH CARE EDUCATION/TRAINING PROGRAM

## 2019-10-30 PROCEDURE — 4004F PT TOBACCO SCREEN RCVD TLK: CPT | Performed by: STUDENT IN AN ORGANIZED HEALTH CARE EDUCATION/TRAINING PROGRAM

## 2019-10-30 RX ORDER — INSULIN LISPRO 100 [IU]/ML
INJECTION, SUSPENSION SUBCUTANEOUS
Qty: 15 ML | Refills: 3 | Status: SHIPPED | OUTPATIENT
Start: 2019-10-30 | End: 2020-04-07 | Stop reason: SDUPTHER

## 2019-10-30 RX ORDER — ONDANSETRON 4 MG/1
4 TABLET, ORALLY DISINTEGRATING ORAL EVERY 8 HOURS PRN
Qty: 20 TABLET | Refills: 0 | Status: SHIPPED | OUTPATIENT
Start: 2019-10-30 | End: 2020-05-14 | Stop reason: SDUPTHER

## 2019-10-30 RX ORDER — LISINOPRIL 10 MG/1
10 TABLET ORAL DAILY
Qty: 30 TABLET | Refills: 2 | Status: SHIPPED | OUTPATIENT
Start: 2019-10-30 | End: 2020-05-12 | Stop reason: SDUPTHER

## 2019-10-30 RX ORDER — BLOOD-GLUCOSE METER
1 KIT MISCELLANEOUS DAILY
Qty: 1 KIT | Refills: 0 | Status: SHIPPED | OUTPATIENT
Start: 2019-10-30 | End: 2020-05-12 | Stop reason: SDUPTHER

## 2019-10-30 RX ORDER — PANTOPRAZOLE SODIUM 20 MG/1
20 TABLET, DELAYED RELEASE ORAL DAILY
Qty: 30 TABLET | Refills: 0 | Status: SHIPPED | OUTPATIENT
Start: 2019-10-30 | End: 2020-04-07 | Stop reason: SDUPTHER

## 2019-10-30 RX ORDER — GUAIFENESIN 600 MG/1
600 TABLET, EXTENDED RELEASE ORAL 2 TIMES DAILY
Qty: 30 TABLET | Refills: 0 | Status: SHIPPED | OUTPATIENT
Start: 2019-10-30 | End: 2019-11-14

## 2019-10-30 ASSESSMENT — ENCOUNTER SYMPTOMS
SHORTNESS OF BREATH: 0
CONSTIPATION: 0
SORE THROAT: 0
ABDOMINAL PAIN: 1
WHEEZING: 0
VOMITING: 0
ANAL BLEEDING: 0
SINUS PRESSURE: 1
DIARRHEA: 0
COUGH: 1
TROUBLE SWALLOWING: 0
BACK PAIN: 0
CHEST TIGHTNESS: 0
PHOTOPHOBIA: 0
ABDOMINAL DISTENTION: 0
CHOKING: 0
NAUSEA: 1

## 2019-11-10 ENCOUNTER — APPOINTMENT (OUTPATIENT)
Dept: GENERAL RADIOLOGY | Age: 29
End: 2019-11-10
Payer: MEDICARE

## 2019-11-10 ENCOUNTER — APPOINTMENT (OUTPATIENT)
Dept: CT IMAGING | Age: 29
End: 2019-11-10
Payer: MEDICARE

## 2019-11-10 ENCOUNTER — HOSPITAL ENCOUNTER (EMERGENCY)
Age: 29
Discharge: HOME | End: 2019-11-10
Attending: EMERGENCY MEDICINE
Payer: MEDICARE

## 2019-11-10 VITALS
WEIGHT: 159.19 LBS | OXYGEN SATURATION: 98 % | TEMPERATURE: 98.4 F | HEART RATE: 93 BPM | HEIGHT: 66 IN | RESPIRATION RATE: 14 BRPM | SYSTOLIC BLOOD PRESSURE: 164 MMHG | BODY MASS INDEX: 25.58 KG/M2 | DIASTOLIC BLOOD PRESSURE: 92 MMHG

## 2019-11-10 DIAGNOSIS — J01.90 ACUTE NON-RECURRENT SINUSITIS, UNSPECIFIED LOCATION: Primary | ICD-10-CM

## 2019-11-10 LAB
-: ABNORMAL
ABSOLUTE EOS #: 0.11 K/UL (ref 0–0.44)
ABSOLUTE IMMATURE GRANULOCYTE: 0.01 K/UL (ref 0–0.3)
ABSOLUTE LYMPH #: 1.3 K/UL (ref 1.1–3.7)
ABSOLUTE MONO #: 0.45 K/UL (ref 0.1–1.2)
AMORPHOUS: ABNORMAL
ANION GAP SERPL CALCULATED.3IONS-SCNC: 11 MMOL/L (ref 9–17)
BACTERIA: ABNORMAL
BASOPHILS # BLD: 1 % (ref 0–2)
BASOPHILS ABSOLUTE: 0.03 K/UL (ref 0–0.2)
BILIRUBIN URINE: NEGATIVE
BUN BLDV-MCNC: 19 MG/DL (ref 6–20)
BUN/CREAT BLD: 17 (ref 9–20)
CALCIUM SERPL-MCNC: 9 MG/DL (ref 8.6–10.4)
CASTS UA: ABNORMAL /LPF
CHLORIDE BLD-SCNC: 102 MMOL/L (ref 98–107)
CO2: 24 MMOL/L (ref 20–31)
COLOR: YELLOW
COMMENT UA: ABNORMAL
CREAT SERPL-MCNC: 1.13 MG/DL (ref 0.7–1.2)
CRYSTALS, UA: ABNORMAL /HPF
DIFFERENTIAL TYPE: ABNORMAL
DIRECT EXAM: NORMAL
DIRECT EXAM: NORMAL
EOSINOPHILS RELATIVE PERCENT: 2 % (ref 1–4)
EPITHELIAL CELLS UA: ABNORMAL /HPF (ref 0–5)
GFR AFRICAN AMERICAN: >60 ML/MIN
GFR NON-AFRICAN AMERICAN: >60 ML/MIN
GFR SERPL CREATININE-BSD FRML MDRD: ABNORMAL ML/MIN/{1.73_M2}
GFR SERPL CREATININE-BSD FRML MDRD: ABNORMAL ML/MIN/{1.73_M2}
GLUCOSE BLD-MCNC: 213 MG/DL (ref 70–99)
GLUCOSE URINE: NEGATIVE
HCT VFR BLD CALC: 38.2 % (ref 40.7–50.3)
HEMOGLOBIN: 12.4 G/DL (ref 13–17)
IMMATURE GRANULOCYTES: 0 %
KETONES, URINE: NEGATIVE
LEUKOCYTE ESTERASE, URINE: NEGATIVE
LYMPHOCYTES # BLD: 28 % (ref 24–43)
Lab: NORMAL
Lab: NORMAL
MCH RBC QN AUTO: 31.7 PG (ref 25.2–33.5)
MCHC RBC AUTO-ENTMCNC: 32.5 G/DL (ref 28.4–34.8)
MCV RBC AUTO: 97.7 FL (ref 82.6–102.9)
MONOCYTES # BLD: 10 % (ref 3–12)
MUCUS: ABNORMAL
NITRITE, URINE: NEGATIVE
NRBC AUTOMATED: 0 PER 100 WBC
OTHER OBSERVATIONS UA: ABNORMAL
PDW BLD-RTO: 13.1 % (ref 11.8–14.4)
PH UA: 6 (ref 5–8)
PLATELET # BLD: 302 K/UL (ref 138–453)
PLATELET ESTIMATE: ABNORMAL
PMV BLD AUTO: 8.6 FL (ref 8.1–13.5)
POTASSIUM SERPL-SCNC: 4.4 MMOL/L (ref 3.7–5.3)
PROTEIN UA: ABNORMAL
RBC # BLD: 3.91 M/UL (ref 4.21–5.77)
RBC # BLD: ABNORMAL 10*6/UL
RBC UA: ABNORMAL /HPF (ref 0–2)
RENAL EPITHELIAL, UA: ABNORMAL /HPF
SEG NEUTROPHILS: 59 % (ref 36–65)
SEGMENTED NEUTROPHILS ABSOLUTE COUNT: 2.75 K/UL (ref 1.5–8.1)
SODIUM BLD-SCNC: 137 MMOL/L (ref 135–144)
SPECIFIC GRAVITY UA: 1.03 (ref 1–1.03)
SPECIMEN DESCRIPTION: NORMAL
SPECIMEN DESCRIPTION: NORMAL
TRICHOMONAS: ABNORMAL
TURBIDITY: ABNORMAL
URINE HGB: ABNORMAL
UROBILINOGEN, URINE: NORMAL
WBC # BLD: 4.7 K/UL (ref 3.5–11.3)
WBC # BLD: ABNORMAL 10*3/UL
WBC UA: ABNORMAL /HPF (ref 0–5)
YEAST: ABNORMAL

## 2019-11-10 PROCEDURE — 71046 X-RAY EXAM CHEST 2 VIEWS: CPT

## 2019-11-10 PROCEDURE — 99285 EMERGENCY DEPT VISIT HI MDM: CPT

## 2019-11-10 PROCEDURE — 85025 COMPLETE CBC W/AUTO DIFF WBC: CPT

## 2019-11-10 PROCEDURE — 87880 STREP A ASSAY W/OPTIC: CPT

## 2019-11-10 PROCEDURE — 81001 URINALYSIS AUTO W/SCOPE: CPT

## 2019-11-10 PROCEDURE — 2580000003 HC RX 258: Performed by: PHYSICIAN ASSISTANT

## 2019-11-10 PROCEDURE — 80048 BASIC METABOLIC PNL TOTAL CA: CPT

## 2019-11-10 PROCEDURE — 70450 CT HEAD/BRAIN W/O DYE: CPT

## 2019-11-10 PROCEDURE — 87804 INFLUENZA ASSAY W/OPTIC: CPT

## 2019-11-10 RX ORDER — AMOXICILLIN 875 MG/1
875 TABLET, COATED ORAL 2 TIMES DAILY
Qty: 20 TABLET | Refills: 0 | Status: SHIPPED | OUTPATIENT
Start: 2019-11-10 | End: 2019-11-20

## 2019-11-10 RX ORDER — 0.9 % SODIUM CHLORIDE 0.9 %
1000 INTRAVENOUS SOLUTION INTRAVENOUS ONCE
Status: COMPLETED | OUTPATIENT
Start: 2019-11-10 | End: 2019-11-10

## 2019-11-10 RX ADMIN — SODIUM CHLORIDE 1000 ML: 9 INJECTION, SOLUTION INTRAVENOUS at 11:58

## 2019-11-10 ASSESSMENT — ENCOUNTER SYMPTOMS
DIARRHEA: 0
VOMITING: 0
NAUSEA: 1
ABDOMINAL DISTENTION: 0
CONSTIPATION: 0
EYE REDNESS: 0
SINUS PRESSURE: 1
RHINORRHEA: 1
WHEEZING: 0
FACIAL SWELLING: 0
ABDOMINAL PAIN: 0
ANAL BLEEDING: 0
EYE ITCHING: 0
BACK PAIN: 0
CHEST TIGHTNESS: 0
BLOOD IN STOOL: 0
COUGH: 1
TROUBLE SWALLOWING: 0
EYE DISCHARGE: 0
SORE THROAT: 1
SINUS PAIN: 0
SHORTNESS OF BREATH: 1

## 2019-11-10 ASSESSMENT — PAIN SCALES - GENERAL: PAINLEVEL_OUTOF10: 9

## 2019-11-10 ASSESSMENT — PAIN DESCRIPTION - PAIN TYPE: TYPE: ACUTE PAIN

## 2020-04-07 ENCOUNTER — VIRTUAL VISIT (OUTPATIENT)
Dept: FAMILY MEDICINE CLINIC | Age: 30
End: 2020-04-07
Payer: MEDICARE

## 2020-04-07 PROCEDURE — 99441 PR PHYS/QHP TELEPHONE EVALUATION 5-10 MIN: CPT | Performed by: STUDENT IN AN ORGANIZED HEALTH CARE EDUCATION/TRAINING PROGRAM

## 2020-04-07 RX ORDER — PANTOPRAZOLE SODIUM 20 MG/1
20 TABLET, DELAYED RELEASE ORAL DAILY
Qty: 30 TABLET | Refills: 0 | Status: SHIPPED | OUTPATIENT
Start: 2020-04-07 | End: 2020-05-12 | Stop reason: SDUPTHER

## 2020-04-07 RX ORDER — INSULIN LISPRO 100 [IU]/ML
INJECTION, SUSPENSION SUBCUTANEOUS
Qty: 15 ML | Refills: 3 | Status: SHIPPED | OUTPATIENT
Start: 2020-04-07 | End: 2020-06-11 | Stop reason: ALTCHOICE

## 2020-04-07 RX ORDER — INSULIN ASPART 100 [IU]/ML
10 INJECTION, SUSPENSION SUBCUTANEOUS 3 TIMES DAILY
Qty: 5 PEN | Refills: 3 | Status: SHIPPED | OUTPATIENT
Start: 2020-04-07 | End: 2020-04-09 | Stop reason: SDUPTHER

## 2020-04-07 RX ORDER — INSULIN LISPRO 100 [IU]/ML
INJECTION, SUSPENSION SUBCUTANEOUS
Qty: 6 ML | Refills: 3 | Status: SHIPPED | OUTPATIENT
Start: 2020-04-07 | End: 2020-06-11 | Stop reason: ALTCHOICE

## 2020-04-07 ASSESSMENT — PATIENT HEALTH QUESTIONNAIRE - PHQ9
1. LITTLE INTEREST OR PLEASURE IN DOING THINGS: 0
SUM OF ALL RESPONSES TO PHQ QUESTIONS 1-9: 0
2. FEELING DOWN, DEPRESSED OR HOPELESS: 0
SUM OF ALL RESPONSES TO PHQ QUESTIONS 1-9: 0
SUM OF ALL RESPONSES TO PHQ9 QUESTIONS 1 & 2: 0

## 2020-04-07 NOTE — PROGRESS NOTES
Sabra Wolf is a 34 y.o. male evaluated via telephone on 4/7/2020. Consent:  He and/or health care decision maker is aware that that he may receive a bill for this telephone service, depending on his insurance coverage, and has provided verbal consent to proceed: Yes      Documentation:  I communicated with the patient and/or health care decision maker about Type 1 DM. Details of this discussion including any medical advice provided: Mr. Bernarda Marte is a 79-year-old male patient who is a type I diabetic. Patient reports taking insulin 50/50 3 times a day 10 units. He also reports taking insulin 75/25 30 units in the morning. 75/25 humalog not covered by insurance, hence will order novolog 70/30 and follow up. Last A1c was 9.7 measured in October 2019. Patient reports his blood sugars at home ranges between 180s to 200s. After discussion with patient I ordered a repeat A1c level. Based on the repeat A1c levels. Needs to be on a long-acting insulin along with Humalog 3 times daily. Based on results we will readjust his insulin regimen. Patient agreeable to plan. Will follow-up in office in 1 month. All questions answered. I affirm this is a Patient Initiated Episode with an Established Patient who has not had a related appointment within my department in the past 7 days or scheduled within the next 24 hours.     Total Time: minutes: 5-10 minutes    Note: not billable if this call serves to triage the patient into an appointment for the relevant concern      Mundo Mir

## 2020-04-08 ENCOUNTER — TELEPHONE (OUTPATIENT)
Dept: FAMILY MEDICINE CLINIC | Age: 30
End: 2020-04-08

## 2020-04-08 NOTE — PROGRESS NOTES
Attending Physician Statement    Wt Readings from Last 3 Encounters:   11/10/19 159 lb 3 oz (72.2 kg)   10/30/19 157 lb 3.2 oz (71.3 kg)   10/28/19 165 lb (74.8 kg)     Temp Readings from Last 3 Encounters:   11/10/19 98.4 °F (36.9 °C)   10/30/19 99.7 °F (37.6 °C) (Oral)   10/28/19 99.1 °F (37.3 °C) (Oral)     BP Readings from Last 3 Encounters:   11/10/19 (!) 164/92   10/30/19 (!) 150/88   10/28/19 (!) 147/94     Pulse Readings from Last 3 Encounters:   11/10/19 93   10/30/19 87   10/28/19 79         I have discussed the care of Paige Sousa, including pertinent history  with the resident. I have reviewed the key elements of all parts of the encounter with the resident. I agree with the assessment, plan and orders as documented by the resident. (GE Modifier)    Confusing mix of insulin.  Per A1c will change to long acting and humalog with meals

## 2020-04-08 NOTE — TELEPHONE ENCOUNTER
Received call from pharmacy, they never received prescription for Humalog 70/30.  Can you please resend

## 2020-04-09 ENCOUNTER — HOSPITAL ENCOUNTER (EMERGENCY)
Age: 30
Discharge: HOME | End: 2020-04-09
Attending: EMERGENCY MEDICINE
Payer: MEDICARE

## 2020-04-09 VITALS
OXYGEN SATURATION: 100 % | SYSTOLIC BLOOD PRESSURE: 173 MMHG | HEIGHT: 66 IN | RESPIRATION RATE: 16 BRPM | WEIGHT: 159 LBS | DIASTOLIC BLOOD PRESSURE: 96 MMHG | BODY MASS INDEX: 25.55 KG/M2 | TEMPERATURE: 97.5 F | HEART RATE: 93 BPM

## 2020-04-09 PROCEDURE — 99281 EMR DPT VST MAYX REQ PHY/QHP: CPT

## 2020-04-09 RX ORDER — INSULIN ASPART 100 [IU]/ML
10 INJECTION, SUSPENSION SUBCUTANEOUS 3 TIMES DAILY
Qty: 5 PEN | Refills: 3 | Status: SHIPPED | OUTPATIENT
Start: 2020-04-09 | End: 2020-05-16 | Stop reason: SDUPTHER

## 2020-04-09 ASSESSMENT — ENCOUNTER SYMPTOMS
VOMITING: 0
COUGH: 0
NAUSEA: 0
COLOR CHANGE: 0
WHEEZING: 0
ABDOMINAL PAIN: 0

## 2020-04-09 NOTE — ED PROVIDER NOTES
[facility] ED     Emergency Department     Faculty Attestation        I performed a history and physical examination of the patient and discussed management with the resident. I reviewed the residents note and agree with the documented findings and plan of care. Any areas of disagreement are noted on the chart. I was personally present for the key portions of any procedures. I have documented in the chart those procedures where I was not present during the key portions. I have reviewed the emergency nurses triage note. I agree with the chief complaint, past medical history, past surgical history, allergies, medications, social and family history as documented unless otherwise noted below. For mid-level providers such as nurse practitioners as well as physicians assistants:    I have personally seen and evaluated the patient. I find the patient's history and physical exam are consistent with NP/PA documentation. I agree with the care provided, treatment rendered, disposition, & follow-up plan. Additional findings are as noted.     Vital Signs: BP (!) 173/96 Comment: did not take bp pill today   Pulse 93   Temp 97.5 °F (36.4 °C)   Resp 16   Ht 5' 6\" (1.676 m)   Wt 159 lb (72.1 kg)   SpO2 100%   BMI 25.66 kg/m²   PCP:  Altagracia Gifford MD    Pertinent Comments:           Critical Care  None          Leann Treviño MD  Attending Emergency Medicine Physician              Yariel Roth MD  04/09/20 0710

## 2020-04-09 NOTE — ED PROVIDER NOTES
[facility]  ED  Emergency Department Encounter  Advanced Practice Provider     Pt Name: Medardo Berry  MRN: [id number]  [place] 1990  Date of evaluation: 20  PCP:  Radha Vieyra MD    [address] [place] [address]       Chief Complaint   Patient presents with    Medication Refill     out of humalog, last dose this am, pcp not open and ins is not covering       HISTORY OF PRESENT ILLNESS  (Location/Symptom, Timing/Onset,Context/Setting, Quality, Duration, Modifying Factors, Severity.)      Medardo Berry is a 34 y.o. male who presents in a refill of his insulin. Patient states that he was on Humalog 50-50 but apparently this medication is not made anymore. He had an ED visit with his primary care doctor earlier this week and they were supposed to send over a new prescription but somehow the pharmacy has not received that prescription. Patient states that he did take his normal dose this morning. He is not having any increased thirst urination he just wants to make sure that he does not miss any doses. 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 has no past surgical history on file.     Social History     Socioeconomic History    Marital status: Single     Spouse name: Not on file    Number of children: Not on file    Years of education: Not on file    Highest education level: Not on file   Occupational History    Not on file   Social Needs    Financial resource strain: Not on file    Food insecurity     Worry: Not on file     Inability: Not on file    Transportation needs     Medical: Not on file     Non-medical: Not on file   Tobacco Use    Smoking status: Current Every Day Smoker     Packs/day: 0.50     Years: 10.00     Pack years: 5.00     Types: Cigarettes     Last attempt to quit: 2013     Years since quittin.6    Smokeless tobacco: Never Used Substance and Sexual Activity    Alcohol use: No    Drug use: Not Currently     Types: Marijuana     Comment: former marijuana    Sexual activity: Yes     Partners: Female   Lifestyle    Physical activity     Days per week: Not on file     Minutes per session: Not on file    Stress: Not on file   Relationships    Social connections     Talks on phone: Not on file     Gets together: Not on file     Attends Yarsani service: Not on file     Active member of club or organization: Not on file     Attends meetings of clubs or organizations: Not on file     Relationship status: Not on file    Intimate partner violence     Fear of current or ex partner: Not on file     Emotionally abused: Not on file     Physically abused: Not on file     Forced sexual activity: Not on file   Other Topics Concern    Not on file   Social History Narrative    Not on file       Family History   Problem Relation Age of Onset    Diabetes Father     Other Father         PAD       Allergies:  Patient has no known allergies. Home Medications:  Prior to Admission medications    Medication Sig Start Date End Date Taking?  Authorizing Provider   insulin aspart protamine-insulin aspart (NOVOLOG MIX 70/30 FLEXPEN) (70-30) 100 UNIT/ML injection Inject 10 Units into the skin 3 times daily 4/9/20  Yes Rolando Glez PA-C   insulin lispro prot & lispro (HUMALOG MIX 50/50 KWIKPEN) (50-50) 100 UNIT per ML SUPN injection pen INJECT 15 UNITS INTO THE SKIN 3 TIMES DAILY (WITH MEALS)  Patient taking differently: 10 Units INJECT 15 UNITS INTO THE SKIN 3 TIMES DAILY (WITH MEALS) 4/7/20  Yes Neo Dos Santos MD   insulin lispro protamine & lispro (HUMALOG MIX 75/25 KWIKPEN) (75-25) 100 UNIT per ML SUPN injection pen INJECT 35 UNITS DAILY WITH BREAKFAST 4/7/20  Yes Neo Dos Santos MD   pantoprazole (PROTONIX) 20 MG tablet Take 1 tablet by mouth daily Take 30 mins before breakfast daily 4/7/20   Neo Dos Santos MD   ondansetron (ZOFRAN ODT) 4 MG

## 2020-04-17 NOTE — TELEPHONE ENCOUNTER
We had addressed this issue last week. Dr. Deandre Griffiths had filled a script for Novolog and Mita Mcleod had reconfirmed it was sent to the pharmacy. They had the script ready to be filled.

## 2020-05-05 ENCOUNTER — HOSPITAL ENCOUNTER (EMERGENCY)
Age: 30
Discharge: HOME | End: 2020-05-06
Attending: EMERGENCY MEDICINE
Payer: MEDICARE

## 2020-05-05 VITALS
RESPIRATION RATE: 12 BRPM | HEART RATE: 88 BPM | WEIGHT: 175 LBS | HEIGHT: 66 IN | OXYGEN SATURATION: 98 % | SYSTOLIC BLOOD PRESSURE: 163 MMHG | DIASTOLIC BLOOD PRESSURE: 93 MMHG | BODY MASS INDEX: 28.12 KG/M2 | TEMPERATURE: 98.3 F

## 2020-05-05 LAB
ANION GAP SERPL CALCULATED.3IONS-SCNC: 13 MMOL/L (ref 9–17)
BUN BLDV-MCNC: 16 MG/DL (ref 6–20)
BUN/CREAT BLD: ABNORMAL (ref 9–20)
CALCIUM SERPL-MCNC: 9.6 MG/DL (ref 8.6–10.4)
CHLORIDE BLD-SCNC: 97 MMOL/L (ref 98–107)
CO2: 24 MMOL/L (ref 20–31)
CREAT SERPL-MCNC: 1.51 MG/DL (ref 0.7–1.2)
GFR AFRICAN AMERICAN: >60 ML/MIN
GFR NON-AFRICAN AMERICAN: 55 ML/MIN
GFR SERPL CREATININE-BSD FRML MDRD: ABNORMAL ML/MIN/{1.73_M2}
GFR SERPL CREATININE-BSD FRML MDRD: ABNORMAL ML/MIN/{1.73_M2}
GLUCOSE BLD-MCNC: 297 MG/DL (ref 70–99)
POTASSIUM SERPL-SCNC: 3.8 MMOL/L (ref 3.7–5.3)
SODIUM BLD-SCNC: 134 MMOL/L (ref 135–144)

## 2020-05-05 PROCEDURE — 6370000000 HC RX 637 (ALT 250 FOR IP): Performed by: STUDENT IN AN ORGANIZED HEALTH CARE EDUCATION/TRAINING PROGRAM

## 2020-05-05 PROCEDURE — 2580000003 HC RX 258: Performed by: STUDENT IN AN ORGANIZED HEALTH CARE EDUCATION/TRAINING PROGRAM

## 2020-05-05 PROCEDURE — 80048 BASIC METABOLIC PNL TOTAL CA: CPT

## 2020-05-05 PROCEDURE — 99283 EMERGENCY DEPT VISIT LOW MDM: CPT

## 2020-05-05 RX ORDER — 0.9 % SODIUM CHLORIDE 0.9 %
1000 INTRAVENOUS SOLUTION INTRAVENOUS ONCE
Status: COMPLETED | OUTPATIENT
Start: 2020-05-05 | End: 2020-05-05

## 2020-05-05 RX ORDER — CYCLOBENZAPRINE HCL 10 MG
10 TABLET ORAL ONCE
Status: COMPLETED | OUTPATIENT
Start: 2020-05-05 | End: 2020-05-05

## 2020-05-05 RX ADMIN — SODIUM CHLORIDE 1000 ML: 9 INJECTION, SOLUTION INTRAVENOUS at 22:59

## 2020-05-05 RX ADMIN — CYCLOBENZAPRINE 10 MG: 10 TABLET, FILM COATED ORAL at 23:30

## 2020-05-05 ASSESSMENT — PAIN DESCRIPTION - PAIN TYPE: TYPE: ACUTE PAIN

## 2020-05-05 ASSESSMENT — PAIN - FUNCTIONAL ASSESSMENT: PAIN_FUNCTIONAL_ASSESSMENT: PREVENTS OR INTERFERES WITH MANY ACTIVE NOT PASSIVE ACTIVITIES

## 2020-05-05 ASSESSMENT — PAIN DESCRIPTION - DESCRIPTORS: DESCRIPTORS: SHARP

## 2020-05-05 ASSESSMENT — PAIN SCALES - GENERAL: PAINLEVEL_OUTOF10: 10

## 2020-05-05 ASSESSMENT — PAIN DESCRIPTION - PROGRESSION: CLINICAL_PROGRESSION: GRADUALLY WORSENING

## 2020-05-06 RX ORDER — CYCLOBENZAPRINE HCL 5 MG
5 TABLET ORAL 2 TIMES DAILY PRN
Qty: 10 TABLET | Refills: 0 | Status: SHIPPED | OUTPATIENT
Start: 2020-05-06 | End: 2020-05-16

## 2020-05-06 ASSESSMENT — ENCOUNTER SYMPTOMS
ABDOMINAL PAIN: 0
COUGH: 0
NAUSEA: 0
RHINORRHEA: 0
SHORTNESS OF BREATH: 0
VOMITING: 0
BACK PAIN: 0

## 2020-05-06 NOTE — ED PROVIDER NOTES
5.00     Types: Cigarettes     Last attempt to quit: 2013     Years since quittin.7    Smokeless tobacco: Never Used   Substance and Sexual Activity    Alcohol use: No    Drug use: Not Currently     Types: Marijuana     Comment: former marijuana    Sexual activity: Yes     Partners: Female   Lifestyle    Physical activity     Days per week: Not on file     Minutes per session: Not on file    Stress: Not on file   Relationships    Social connections     Talks on phone: Not on file     Gets together: Not on file     Attends [employer] service: Not on file     Active member of club or organization: Not on file     Attends meetings of clubs or organizations: Not on file     Relationship status: Not on file    Intimate partner violence     Fear of current or ex partner: Not on file     Emotionally abused: Not on file     Physically abused: Not on file     Forced sexual activity: Not on file   Other Topics Concern    Not on file   Social History Narrative    Not on file       Family History   Problem Relation Age of Onset    Diabetes Father     Other Father         PAD       Allergies:  Patient has no known allergies. Home Medications:  Prior to Admission medications    Medication Sig Start Date End Date Taking?  Authorizing Provider   cyclobenzaprine (FLEXERIL) 5 MG tablet Take 1 tablet by mouth 2 times daily as needed for Muscle spasms 20 Yes Ronaldo Akins MD   insulin aspart protamine-insulin aspart (NOVOLOG MIX 70/30 FLEXPEN) (70-30) 100 UNIT/ML injection Inject 10 Units into the skin 3 times daily 20   David Medina PA-C   insulin lispro prot & lispro (HUMALOG MIX 50/50 KWIKPEN) (50-50) 100 UNIT per ML SUPN injection pen INJECT 15 UNITS INTO THE SKIN 3 TIMES DAILY (WITH MEALS)  Patient taking differently: 10 Units INJECT 15 UNITS INTO THE SKIN 3 TIMES DAILY (WITH MEALS) 20   Gela Shah MD   insulin lispro protamine & lispro (HUMALOG MIX 75/25 KWIKPEN) (75-25) 100 nursing note reviewed. Constitutional:       Appearance: Normal appearance. HENT:      Head: Normocephalic and atraumatic. Eyes:      Extraocular Movements: Extraocular movements intact. Conjunctiva/sclera: Conjunctivae normal.   Cardiovascular:      Rate and Rhythm: Normal rate. Heart sounds: No murmur. Pulmonary:      Effort: Pulmonary effort is normal.      Breath sounds: Normal breath sounds. Musculoskeletal: Normal range of motion. General: No swelling, tenderness, deformity or signs of injury. Right lower leg: No edema. Left lower leg: No edema. Comments: 5/5  strength, no sensory deficits, full range of motion of bilateral upper extremities, radial ulnar pulses 2+ bilaterally, no rash,   Skin:     General: Skin is warm. Capillary Refill: Capillary refill takes less than 2 seconds. Neurological:      Mental Status: He is alert.          DIFFERENTIAL  DIAGNOSIS     PLAN (LABS / IMAGING / EKG):  Orders Placed This Encounter   Procedures    Basic Metabolic Panel    GLUCOSE, WHOLE BLOOD       MEDICATIONS ORDERED:  Orders Placed This Encounter   Medications    0.9 % sodium chloride bolus    cyclobenzaprine (FLEXERIL) tablet 10 mg    cyclobenzaprine (FLEXERIL) 5 MG tablet     Sig: Take 1 tablet by mouth 2 times daily as needed for Muscle spasms     Dispense:  10 tablet     Refill:  0         DIAGNOSTIC RESULTS / EMERGENCY DEPARTMENT COURSE / MDM     LABS:  Results for orders placed or performed during the hospital encounter of 14/51/45   Basic Metabolic Panel   Result Value Ref Range    Glucose 297 (H) 70 - 99 mg/dL    BUN 16 6 - 20 mg/dL    CREATININE 1.51 (H) 0.70 - 1.20 mg/dL    Bun/Cre Ratio NOT REPORTED 9 - 20    Calcium 9.6 8.6 - 10.4 mg/dL    Sodium 134 (L) 135 - 144 mmol/L    Potassium 3.8 3.7 - 5.3 mmol/L    Chloride 97 (L) 98 - 107 mmol/L    CO2 24 20 - 31 mmol/L    Anion Gap 13 9 - 17 mmol/L    GFR Non-African American 55 (L) >60 mL/min    GFR  >60 >60 mL/min    GFR Comment          GFR Staging NOT REPORTED          MDM/EMERGENCY DEPARTMENT COURSE:    80-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 Nontoxic-appearing, vital signs within normal limits, neurovascularly intact. Will obtain sugar, BMP, to evaluate for electrolyte abnormality causing cramping and reassess. We will also give fluids. ED Course as of May 06 0530   Tue May 05, 2020   2330 Patient reassessed, now has cramping in his legs. Will trial muscle relaxant. [AF]   4800 Patient no longer having spasms after Flexeril. Tamika return instructions, patient agreeable with plan. [AF]      ED Course User Index  [AF] Gabriela Celis MD       BMP within normal limits. Blood sugar is high, however, not suggestive of DKA. Patient's pain improved with muscle relaxant. I discussed return instructions, close follow-up with her primary care physician, patient is agreeable with plan. PROCEDURES:  None    CONSULTS:  None    CRITICAL CARE:  None    FINAL IMPRESSION      1.  Hand or foot spasms          DISPOSITION / PLAN     DISPOSITION Decision To Discharge    PATIENT REFERRED TO:  Christopher Daniels MD  [address] [place] [address]  [phone]    Schedule an appointment as soon as possible for a visit       [facility]  [address] [address]  [phone]  Go to   If symptoms worsen      DISCHARGE MEDICATIONS:  Discharge Medication List as of 5/6/2020 12:00 AM      START taking these medications    Details   cyclobenzaprine (FLEXERIL) 5 MG tablet Take 1 tablet by mouth 2 times daily as needed for Muscle spasms, Disp-10 tablet, R-0Print             Gabriela Celis MD  Emergency Medicine Resident    (Please note that portions of this note were completed with a voicerecognition program.  Efforts were made to edit the dictations but occasionally words are mis-transcribed.)        Gadiel Jones Purnima Real MD  05/06/20 9804

## 2020-05-06 NOTE — ED PROVIDER NOTES
[facility] ED     Emergency Department     Faculty Attestation        I performed a history and physical examination of the patient and discussed management with the resident. I reviewed the residents note and agree with the documented findings and plan of care. Any areas of disagreement are noted on the chart. I was personally present for the key portions of any procedures. I have documented in the chart those procedures where I was not present during the key portions. I have reviewed the emergency nurses triage note. I agree with the chief complaint, past medical history, past surgical history, allergies, medications, social and family history as documented unless otherwise noted below. For Physician Assistant/ Nurse Practitioner cases/documentation I have personally evaluated this patient and have completed at least one if not all key elements of the E/M (history, physical exam, and MDM). Additional findings are as noted. Vital Signs: BP: (!) 163/93  Pulse: 88  Resp: 12  Temp: 98.3 °F (36.8 °C) SpO2: 98 %  PCP:  Amarilys Galvan MD    Pertinent Comments:         Critical Care  None    This patient was evaluated in the Emergency Department for symptoms described in the history of present illness. He/she was evaluated in the context of the global COVID-19 pandemic, which necessitated consideration that the patient might be at risk for infection with the SARS-CoV-2 virus that causes COVID-19. Institutional protocols and algorithms that pertain to the evaluation of patients at risk for COVID-19 are in a state of rapid change based on information released by regulatory bodies including the CDC and federal and state organizations. These policies and algorithms were followed during the patient's care in the ED.     (Please note that portions of this note were completed with a voice recognition program. Efforts were made to edit the dictations but occasionally words are mis-transcribed.  Whenever words are used in this note in any gender, they shall be construed as though they were used in the gender appropriate to the circumstances; and whenever words are used in this note in the singular or plural form, they shall be construed as though they were used in the form appropriate to the circumstances.)    Gregorio Styles MD [facility]  Attending Emergency Medicine Physician            Ethel Harris MD  05/05/20 0410

## 2020-05-13 NOTE — TELEPHONE ENCOUNTER
E-scribe request for lisinopril. Please review and e-scribe if applicable. Last Visit Date:  4-7-20  Next Visit Date:  5/12/2020    Hemoglobin A1C (%)   Date Value   10/30/2019 9.7   02/18/2019 9.5 (A)   01/24/2018 9.0             ( goal A1C is < 7)   Microalb/Crt.  Ratio (mcg/mg creat)   Date Value   01/24/2018 779 (H)     LDL Cholesterol (mg/dL)   Date Value   05/29/2013 109 (H)       (goal LDL is <100)   AST (U/L)   Date Value   02/09/2017 25     ALT (U/L)   Date Value   02/09/2017 26     BUN (mg/dL)   Date Value   05/05/2020 16     BP Readings from Last 3 Encounters:   05/05/20 (!) 163/93   04/09/20 (!) 173/96   11/10/19 (!) 164/92          (goal 120/80)        Patient Active Problem List:     Type 1 diabetes mellitus without complication ([address]     Diabetic ketoacidosis without coma ([address]     Smoking     Hypomagnesemia     HLD (hyperlipidemia)     Ketosis due to diabetes ([address]     Nausea and vomiting in adult     McArdle disease ([address]     Gastroenteritis      ----Agueda Tolentino

## 2020-05-13 NOTE — TELEPHONE ENCOUNTER
E-scribe request for pending medication. Please review and e-scribe if applicable. Last Visit Date:  4-7-20  Next Visit Date:  5/12/2020    Hemoglobin A1C (%)   Date Value   10/30/2019 9.7   02/18/2019 9.5 (A)   01/24/2018 9.0             ( goal A1C is < 7)   Microalb/Crt.  Ratio (mcg/mg creat)   Date Value   01/24/2018 779 (H)     LDL Cholesterol (mg/dL)   Date Value   05/29/2013 109 (H)       (goal LDL is <100)   AST (U/L)   Date Value   02/09/2017 25     ALT (U/L)   Date Value   02/09/2017 26     BUN (mg/dL)   Date Value   05/05/2020 16     BP Readings from Last 3 Encounters:   05/05/20 (!) 163/93   04/09/20 (!) 173/96   11/10/19 (!) 164/92          (goal 120/80)        Patient Active Problem List:     Type 1 diabetes mellitus without complication ([address]     Diabetic ketoacidosis without coma ([address]     Smoking     Hypomagnesemia     HLD (hyperlipidemia)     Ketosis due to diabetes ([address]     Nausea and vomiting in adult     McArdle disease ([address]     Gastroenteritis      ----Padmini Greene

## 2020-05-16 ENCOUNTER — HOSPITAL ENCOUNTER (EMERGENCY)
Age: 30
Discharge: HOME | End: 2020-05-16
Attending: EMERGENCY MEDICINE
Payer: MEDICARE

## 2020-05-16 VITALS
DIASTOLIC BLOOD PRESSURE: 97 MMHG | RESPIRATION RATE: 18 BRPM | TEMPERATURE: 98 F | SYSTOLIC BLOOD PRESSURE: 183 MMHG | HEART RATE: 103 BPM | OXYGEN SATURATION: 100 %

## 2020-05-16 LAB — GLUCOSE BLD-MCNC: 311 MG/DL (ref 75–110)

## 2020-05-16 PROCEDURE — 82947 ASSAY GLUCOSE BLOOD QUANT: CPT

## 2020-05-16 PROCEDURE — 99282 EMERGENCY DEPT VISIT SF MDM: CPT

## 2020-05-16 RX ORDER — LISINOPRIL 10 MG/1
10 TABLET ORAL DAILY
Qty: 30 TABLET | Refills: 2 | Status: SHIPPED | OUTPATIENT
Start: 2020-05-16 | End: 2020-08-07 | Stop reason: SDUPTHER

## 2020-05-16 RX ORDER — INSULIN ASPART 100 [IU]/ML
10 INJECTION, SUSPENSION SUBCUTANEOUS 3 TIMES DAILY
Qty: 5 PEN | Refills: 3 | Status: SHIPPED | OUTPATIENT
Start: 2020-05-16 | End: 2020-06-11 | Stop reason: ALTCHOICE

## 2020-05-16 RX ORDER — BLOOD-GLUCOSE METER
1 KIT MISCELLANEOUS DAILY
Qty: 1 KIT | Refills: 0 | Status: SHIPPED | OUTPATIENT
Start: 2020-05-16 | End: 2021-11-15

## 2020-05-16 RX ORDER — PANTOPRAZOLE SODIUM 20 MG/1
20 TABLET, DELAYED RELEASE ORAL DAILY
Qty: 30 TABLET | Refills: 0 | Status: SHIPPED | OUTPATIENT
Start: 2020-05-16 | End: 2020-06-12

## 2020-05-16 NOTE — ED PROVIDER NOTES
of need for insulin refill. Last dose was last night. He attempted to have it filled at the pharmacy however they have no refills currently recorded. Denies any complaints otherwise. Physical:     oral temperature is 98 °F (36.7 °C). His blood pressure is 183/97 (abnormal) and his pulse is 103. His respiration is 18 and oxygen saturation is 100%.     27 y.o. male no acute distress, cardiac exam regular rate, measured at approximately 80 on my evaluation, and rhythm no murmurs rubs gallops, pulmonary clear bilaterally abdomen is soft nontender nondistended    Impression: Medication refill    Plan: Point-of-care glucose, refill insulin, dose here      Katia Rothman MD, [facility]  Attending Emergency Physician         Amanda Jacinto MD  05/16/20 5282
(PRINIVIL;ZESTRIL) 10 MG tablet Take 1 tablet by mouth daily 5/16/20   Salma Eckert MD   pantoprazole (PROTONIX) 20 MG tablet Take 1 tablet by mouth daily Take 30 mins before breakfast daily 5/16/20   Salma Eckert MD   ondansetron (ZOFRAN ODT) 4 MG disintegrating tablet Take 1 tablet by mouth every 8 hours as needed for Nausea or Vomiting 5/16/20   Salma Eckert MD   cyclobenzaprine (FLEXERIL) 5 MG tablet Take 1 tablet by mouth 2 times daily as needed for Muscle spasms 5/6/20 5/16/20  Tanisha Marie MD   insulin lispro prot & lispro (HUMALOG MIX 50/50 KWIKPEN) (50-50) 100 UNIT per ML SUPN injection pen INJECT 15 UNITS INTO THE SKIN 3 TIMES DAILY (WITH MEALS)  Patient taking differently: 10 Units INJECT 15 UNITS INTO THE SKIN 3 TIMES DAILY (WITH MEALS) 4/7/20   Yady Pressley MD   insulin lispro protamine & lispro (HUMALOG MIX 75/25 KWIKPEN) (75-25) 100 UNIT per ML SUPN injection pen INJECT 35 UNITS DAILY WITH BREAKFAST 4/7/20   Yady Pressley MD   Insulin Pen Needle (ULTICARE MINI PEN NEEDLES) 31G X 6 MM MISC USE AS DIRECTED WITH INSULIN PENS (UP TO 3 TIMES DAILY) 10/30/19   Patti Fraga MD   Pseudoephedrine-Naproxen Na ER (ALEVE-D SINUS & COLD) 120-220 MG TB12 Take 1 tablet by mouth every 8 hours as needed (Sinus pressure) 10/30/19   Patti Fraga MD   albuterol sulfate HFA (PROAIR HFA) 108 (90 Base) MCG/ACT inhaler Inhale 2 puffs into the lungs every 4 hours as needed for Wheezing 9/6/19   Paloma Bernard PA-C     PHYSICAL EXAM       BP (!) 183/97   Pulse 103   Temp 98 °F (36.7 °C) (Oral)   Resp 18   SpO2 100%     CONSTITUTIONAL: Vital signs reviewed, Alert and oriented X 3. HEAD: Atraumatic, Normocephalic. EYES: Eyes are normal to inspection, Pupils equal, round and reactive to light. NECK: Normal ROM, No jugular venous distention, No meningeal signs. RESPIRATORY CHEST: No respiratory distress. ABDOMEN: Abdomen is nontender, No distension. No pulsatile masses palpated.

## 2020-05-16 NOTE — PROGRESS NOTES
This patient was assigned to me by error. This patient was never interviewed nor examined by me. I did not participate in the care of this patient.     Adina Moore, PGY-2

## 2020-05-18 ENCOUNTER — TELEPHONE (OUTPATIENT)
Dept: FAMILY MEDICINE CLINIC | Age: 30
End: 2020-05-18

## 2020-05-18 RX ORDER — UBIQUINOL 100 MG
1 CAPSULE ORAL
Qty: 100 EACH | Refills: 11 | Status: SHIPPED | OUTPATIENT
Start: 2020-05-18 | End: 2021-03-01 | Stop reason: SDUPTHER

## 2020-05-18 RX ORDER — LANCETS 30 GAUGE
1 EACH MISCELLANEOUS DAILY
Qty: 100 EACH | Refills: 11 | Status: SHIPPED | OUTPATIENT
Start: 2020-05-18 | End: 2021-03-01 | Stop reason: SDUPTHER

## 2020-05-18 RX ORDER — GLUCOSAMINE HCL/CHONDROITIN SU 500-400 MG
1 CAPSULE ORAL
Qty: 100 STRIP | Refills: 11 | Status: SHIPPED | OUTPATIENT
Start: 2020-05-18 | End: 2021-03-01 | Stop reason: SDUPTHER

## 2020-06-11 ENCOUNTER — OFFICE VISIT (OUTPATIENT)
Dept: FAMILY MEDICINE CLINIC | Age: 30
End: 2020-06-11
Payer: MEDICARE

## 2020-06-11 VITALS
BODY MASS INDEX: 24.86 KG/M2 | WEIGHT: 154 LBS | SYSTOLIC BLOOD PRESSURE: 144 MMHG | HEART RATE: 80 BPM | DIASTOLIC BLOOD PRESSURE: 88 MMHG | TEMPERATURE: 98 F

## 2020-06-11 PROBLEM — M62.838 MUSCLE SPASM: Status: ACTIVE | Noted: 2020-06-11

## 2020-06-11 PROBLEM — R11.0 NAUSEA: Status: ACTIVE | Noted: 2018-03-22

## 2020-06-11 LAB — HBA1C MFR BLD: 12.6 %

## 2020-06-11 PROCEDURE — 3046F HEMOGLOBIN A1C LEVEL >9.0%: CPT | Performed by: FAMILY MEDICINE

## 2020-06-11 PROCEDURE — G8420 CALC BMI NORM PARAMETERS: HCPCS | Performed by: FAMILY MEDICINE

## 2020-06-11 PROCEDURE — 83036 HEMOGLOBIN GLYCOSYLATED A1C: CPT | Performed by: STUDENT IN AN ORGANIZED HEALTH CARE EDUCATION/TRAINING PROGRAM

## 2020-06-11 PROCEDURE — 2022F DILAT RTA XM EVC RTNOPTHY: CPT | Performed by: FAMILY MEDICINE

## 2020-06-11 PROCEDURE — G8427 DOCREV CUR MEDS BY ELIG CLIN: HCPCS | Performed by: FAMILY MEDICINE

## 2020-06-11 PROCEDURE — 99213 OFFICE O/P EST LOW 20 MIN: CPT | Performed by: STUDENT IN AN ORGANIZED HEALTH CARE EDUCATION/TRAINING PROGRAM

## 2020-06-11 PROCEDURE — 4004F PT TOBACCO SCREEN RCVD TLK: CPT | Performed by: FAMILY MEDICINE

## 2020-06-11 RX ORDER — ONDANSETRON 4 MG/1
4 TABLET, FILM COATED ORAL DAILY PRN
Qty: 30 TABLET | Refills: 0 | Status: SHIPPED | OUTPATIENT
Start: 2020-06-11 | End: 2020-08-28

## 2020-06-11 RX ORDER — INSULIN GLARGINE 100 [IU]/ML
10 INJECTION, SOLUTION SUBCUTANEOUS NIGHTLY
Qty: 5 PEN | Refills: 0 | Status: SHIPPED | OUTPATIENT
Start: 2020-06-11 | End: 2020-06-19

## 2020-06-11 RX ORDER — INSULIN LISPRO 100 [IU]/ML
5 INJECTION, SOLUTION INTRAVENOUS; SUBCUTANEOUS
Qty: 1 PEN | Refills: 5 | Status: SHIPPED | OUTPATIENT
Start: 2020-06-11 | End: 2020-06-19 | Stop reason: ALTCHOICE

## 2020-06-11 RX ORDER — CYCLOBENZAPRINE HCL 5 MG
5 TABLET ORAL 2 TIMES DAILY PRN
Qty: 10 TABLET | Refills: 0 | Status: SHIPPED | OUTPATIENT
Start: 2020-06-11 | End: 2020-07-27

## 2020-06-11 ASSESSMENT — ENCOUNTER SYMPTOMS
CHEST TIGHTNESS: 0
SHORTNESS OF BREATH: 0

## 2020-06-11 NOTE — PROGRESS NOTES
11/10/2019    CHOL 172 05/29/2013    TRIG 58 05/29/2013    HDL 51 05/29/2013    ALT 26 02/09/2017    AST 25 02/09/2017     (L) 05/05/2020    K 3.8 05/05/2020    CL 97 (L) 05/05/2020    CREATININE 1.51 (H) 05/05/2020    BUN 16 05/05/2020    CO2 24 05/05/2020    TSH 0.58 08/14/2013    INR 1.0 03/23/2018    LABA1C 12.6 (A) 06/11/2020    LABMICR 779 (H) 01/24/2018     Lab Results   Component Value Date    CALCIUM 9.6 05/05/2020    PHOS 3.3 03/22/2018     Lab Results   Component Value Date    LDLCHOLESTEROL 109 (H) 05/29/2013       Assessment and Plan:    1. Type 1 diabetes mellitus without complication (HCC)  - POCT glycosylated hemoglobin (Hb A1C)  - insulin glargine (LANTUS SOLOSTAR) 100 UNIT/ML injection pen; Inject 10 Units into the skin nightly  Dispense: 5 pen; Refill: 0  - insulin lispro, 1 Unit Dial, (HUMALOG KWIKPEN) 100 UNIT/ML SOPN; Inject 5 Units into the skin 3 times daily (before meals)  Dispense: 1 pen; Refill: 5  - [facility] - Elvie Pope  -We will stop patient's original insulin regimen. Will start patient on Lantus 10 units nightly along with Humalog 5 units 3 times daily pre-meals. Patient asked to record blood sugars at home will reevaluate in 2 months. 2. Muscle spasm  - cyclobenzaprine (FLEXERIL) 5 MG tablet; Take 1 tablet by mouth 2 times daily as needed for Muscle spasms  Dispense: 10 tablet; Refill: 0    3. Nausea  - ondansetron (ZOFRAN) 4 MG tablet; Take 1 tablet by mouth daily as needed for Nausea or Vomiting  Dispense: 30 tablet;  Refill: 0          Requested Prescriptions     Signed Prescriptions Disp Refills    insulin glargine (LANTUS SOLOSTAR) 100 UNIT/ML injection pen 5 pen 0     Sig: Inject 10 Units into the skin nightly    insulin lispro, 1 Unit Dial, (HUMALOG KWIKPEN) 100 UNIT/ML SOPN 1 pen 5     Sig: Inject 5 Units into the skin 3 times daily (before meals)    cyclobenzaprine (FLEXERIL) 5 MG tablet 10 tablet 0     Sig: Take 1 tablet by mouth 2 times daily as needed for Muscle spasms    ondansetron (ZOFRAN) 4 MG tablet 30 tablet 0     Sig: Take 1 tablet by mouth daily as needed for Nausea or Vomiting       Medications Discontinued During This Encounter   Medication Reason    insulin aspart protamine-insulin aspart (NOVOLOG MIX 70/30 FLEXPEN) (70-30) 100 UNIT/ML injection Therapy completed    insulin lispro prot & lispro (HUMALOG MIX 50/50 KWIKPEN) (50-50) 100 UNIT per ML SUPN injection pen Therapy completed    insulin lispro protamine & lispro (HUMALOG MIX 75/25 KWIKPEN) (75-25) 100 UNIT per ML SUPN injection pen Therapy completed       Vaibhav received counseling on the following healthy behaviors:nutrition, exercise and medication adherence    Discussed use, benefit, and side effects of prescribed medications. Barriers to medication compliance addressed. All patient questions answered. Pt voicedunderstanding. Return in about 2 months (around 8/11/2020) for Recheck A1c, .

## 2020-06-11 NOTE — PROGRESS NOTES
Visit Information    Have you changed or started any medications since your last visit including any over-the-counter medicines, vitamins, or herbal medicines? no   Have you stopped taking any of your medications? Is so, why? -  no  Are you having any side effects from any of your medications? - no    Have you seen any other physician or provider since your last visit?  no   Have you had any other diagnostic tests since your last visit?  no   Have you been seen in the emergency room and/or had an admission in a hospital since we last saw you?  no   Have you had your routine dental cleaning in the past 6 months?  no     Do you have an active MyChart account? If no, what is the barrier?   Yes    Patient Care Team:  Lily Rodriguez MD as PCP - General (Family Medicine)    Medical History Review  Past Medical, Family, and Social History reviewed and does not contribute to the patient presenting condition    Health Maintenance   Topic Date Due    Diabetic foot exam  05/01/2000    Diabetic retinal exam  05/01/2000    HIV screen  05/01/2005    Lipid screen  05/29/2014    Diabetic microalbuminuria test  01/24/2019    Annual Wellness Visit (AWV)  06/20/2019    A1C test (Diabetic or Prediabetic)  01/30/2020    Pneumococcal 0-64 years Vaccine (1 of 1 - PPSV23) 12/11/2020 (Originally 5/1/1996)    Hepatitis B vaccine (1 of 3 - Risk 3-dose series) 06/11/2021 (Originally 5/1/2009)    DTaP/Tdap/Td vaccine (1 - Tdap) 06/11/2021 (Originally 5/1/2009)    Flu vaccine (Season Ended) 09/01/2020    Potassium monitoring  05/05/2021    Creatinine monitoring  05/05/2021    Hepatitis A vaccine  Aged Out    Hib vaccine  Aged Out    Meningococcal (ACWY) vaccine  Aged Out    Varicella vaccine  Discontinued

## 2020-06-11 NOTE — PROGRESS NOTES
Attending Physician Statement  I have discussed the care of Maribel Bis, including pertinent history and exam findings,  with the resident. I have reviewed the key elements of all parts of the encounter with the resident. I agree with the assessment, plan and orders as documented by the resident.   (GE Modifier)    Becka Kearney

## 2020-06-12 RX ORDER — PANTOPRAZOLE SODIUM 20 MG/1
20 TABLET, DELAYED RELEASE ORAL DAILY
Qty: 30 TABLET | Refills: 0 | Status: SHIPPED | OUTPATIENT
Start: 2020-06-12 | End: 2020-06-16

## 2020-06-12 NOTE — TELEPHONE ENCOUNTER
E-scribe request for pending medication. Please review and e-scribe if applicable. Last Visit Date:  6-11-20  Next Visit Date:  Visit date not found    Hemoglobin A1C (%)   Date Value   06/11/2020 12.6 (A)   10/30/2019 9.7   02/18/2019 9.5 (A)             ( goal A1C is < 7)   Microalb/Crt.  Ratio (mcg/mg creat)   Date Value   01/24/2018 779 (H)     LDL Cholesterol (mg/dL)   Date Value   05/29/2013 109 (H)       (goal LDL is <100)   AST (U/L)   Date Value   02/09/2017 25     ALT (U/L)   Date Value   02/09/2017 26     BUN (mg/dL)   Date Value   05/05/2020 16     BP Readings from Last 3 Encounters:   06/11/20 (!) 144/88   05/16/20 (!) 183/97   05/05/20 (!) 163/93          (goal 120/80)        Patient Active Problem List:     Type 1 diabetes mellitus without complication ([address]     Diabetic ketoacidosis without coma ([address]     Smoking     Hypomagnesemia     HLD (hyperlipidemia)     Ketosis due to diabetes (HCC)     Nausea     McArdle disease (HCC)     Gastroenteritis     Muscle spasm      ----Janiece Bence

## 2020-06-15 ENCOUNTER — TELEPHONE (OUTPATIENT)
Dept: FAMILY MEDICINE CLINIC | Age: 30
End: 2020-06-15

## 2020-06-16 RX ORDER — PANTOPRAZOLE SODIUM 20 MG/1
20 TABLET, DELAYED RELEASE ORAL DAILY
Qty: 30 TABLET | Refills: 0 | Status: SHIPPED | OUTPATIENT
Start: 2020-06-16 | End: 2020-07-28 | Stop reason: SDUPTHER

## 2020-06-19 RX ORDER — INSULIN GLARGINE 100 [IU]/ML
10 INJECTION, SOLUTION SUBCUTANEOUS NIGHTLY
Qty: 5 PEN | Refills: 0 | Status: SHIPPED | OUTPATIENT
Start: 2020-06-19 | End: 2020-09-09 | Stop reason: SDUPTHER

## 2020-06-25 ENCOUNTER — HOSPITAL ENCOUNTER (EMERGENCY)
Age: 30
Discharge: HOME | End: 2020-06-25
Attending: EMERGENCY MEDICINE
Payer: MEDICARE

## 2020-06-25 VITALS
BODY MASS INDEX: 26.52 KG/M2 | HEIGHT: 66 IN | SYSTOLIC BLOOD PRESSURE: 163 MMHG | TEMPERATURE: 99 F | WEIGHT: 165 LBS | RESPIRATION RATE: 17 BRPM | OXYGEN SATURATION: 98 % | HEART RATE: 93 BPM | DIASTOLIC BLOOD PRESSURE: 85 MMHG

## 2020-06-25 LAB
CHP ED QC CHECK: NORMAL
GLUCOSE BLD-MCNC: 196 MG/DL
GLUCOSE BLD-MCNC: 197 MG/DL (ref 75–110)

## 2020-06-25 PROCEDURE — 99283 EMERGENCY DEPT VISIT LOW MDM: CPT

## 2020-06-25 PROCEDURE — 6370000000 HC RX 637 (ALT 250 FOR IP): Performed by: EMERGENCY MEDICINE

## 2020-06-25 PROCEDURE — 2500000003 HC RX 250 WO HCPCS: Performed by: EMERGENCY MEDICINE

## 2020-06-25 PROCEDURE — 82947 ASSAY GLUCOSE BLOOD QUANT: CPT

## 2020-06-25 RX ORDER — LIDOCAINE HYDROCHLORIDE 10 MG/ML
20 INJECTION, SOLUTION INFILTRATION; PERINEURAL ONCE
Status: COMPLETED | OUTPATIENT
Start: 2020-06-25 | End: 2020-06-25

## 2020-06-25 RX ORDER — IBUPROFEN 800 MG/1
800 TABLET ORAL ONCE
Status: COMPLETED | OUTPATIENT
Start: 2020-06-25 | End: 2020-06-25

## 2020-06-25 RX ORDER — DIAZEPAM 5 MG/1
5 TABLET ORAL EVERY 8 HOURS PRN
Qty: 5 TABLET | Refills: 0 | Status: SHIPPED | OUTPATIENT
Start: 2020-06-25 | End: 2020-07-05

## 2020-06-25 RX ADMIN — LIDOCAINE HYDROCHLORIDE 20 ML: 10 INJECTION, SOLUTION INFILTRATION; PERINEURAL at 22:26

## 2020-06-25 RX ADMIN — IBUPROFEN 800 MG: 800 TABLET, FILM COATED ORAL at 22:26

## 2020-06-25 ASSESSMENT — ENCOUNTER SYMPTOMS
BACK PAIN: 1
WHEEZING: 0
CONSTIPATION: 0
ABDOMINAL PAIN: 0
VOMITING: 0
NAUSEA: 0
SHORTNESS OF BREATH: 0
SORE THROAT: 0
DIARRHEA: 0
BLOOD IN STOOL: 0
COUGH: 0

## 2020-06-25 ASSESSMENT — PAIN DESCRIPTION - PAIN TYPE: TYPE: ACUTE PAIN;CHRONIC PAIN

## 2020-06-25 ASSESSMENT — PAIN DESCRIPTION - ORIENTATION: ORIENTATION: RIGHT

## 2020-06-25 ASSESSMENT — PAIN DESCRIPTION - PROGRESSION: CLINICAL_PROGRESSION: GRADUALLY WORSENING

## 2020-06-25 ASSESSMENT — PAIN DESCRIPTION - LOCATION: LOCATION: BACK

## 2020-06-25 ASSESSMENT — PAIN DESCRIPTION - DESCRIPTORS: DESCRIPTORS: SHARP

## 2020-06-25 ASSESSMENT — PAIN DESCRIPTION - FREQUENCY: FREQUENCY: CONTINUOUS

## 2020-06-25 ASSESSMENT — PAIN SCALES - GENERAL: PAINLEVEL_OUTOF10: 10

## 2020-06-26 NOTE — ED PROVIDER NOTES
[facility]     Emergency Department     Faculty Note/ Attestation      Pt Name: Nicky Brown                                       MRN: [id number]  [place] 1990  Date of evaluation: 6/25/2020    Patients PCP:    Patti Fraga MD    Attestation  I performed a history and physical examination of the patient and discussed management with the resident. I reviewed the residents note and agree with the documented findings and plan of care. Any areas of disagreement are noted on the chart. I was personally present for the key portions of any procedures. I have documented in the chart those procedures where I was not present during the key portions. I have reviewed the emergency nurses triage note. I agree with the chief complaint, past medical history, past surgical history, allergies, medications, social and family history as documented unless otherwise noted below. For Physician Assistant/ Nurse Practitioner cases/documentation I have personally evaluated this patient and have completed at least one if not all key elements of the E/M (history, physical exam, and MDM). Additional findings are as noted.     Initial Screens:        [place] Coma Scale  Eye Opening: Spontaneous  Best Verbal Response: Oriented  Best Motor Response: Obeys commands  Xander Coma Scale Score: 15    Vitals:    Vitals:    06/25/20 2141 06/25/20 2153   BP:  (!) 163/85   Pulse:  93   Resp:  17   Temp: 99 °F (37.2 °C)    SpO2:  98%   Weight:  165 lb (74.8 kg)   Height:  5' 6\" (1.676 m)       CHIEF COMPLAINT       Chief Complaint   Patient presents with    Back Pain     right side sharp pain x 3 days, spasms, denies injury       Presents with bilateral back spasm worse on the right    DIAGNOSTIC RESULTS     RADIOLOGY:   No orders to display       LABS:  Labs Reviewed   POC GLUCOSE FINGERSTICK - Abnormal; Notable for the following components:       Result Value    POC Glucose 197 (*)     All other components within
for back pain. Negative for neck pain. Skin: Negative for rash. Neurological: Negative for dizziness and headaches. Hematological: Does not bruise/bleed easily. PHYSICAL EXAM   (up to 7 for level 4, 8 or more for level 5)      INITIAL VITALS:   BP (!) 163/85   Pulse 93   Temp 99 °F (37.2 °C)   Resp 17   Ht 5' 6\" (1.676 m)   Wt 165 lb (74.8 kg)   SpO2 98%   BMI 26.63 kg/m²     Physical Exam  Constitutional:       General: He is not in acute distress. Appearance: He is well-developed. He is not diaphoretic. HENT:      Head: Normocephalic and atraumatic. Nose: Nose normal.   Neck:      Musculoskeletal: Normal range of motion. Cardiovascular:      Rate and Rhythm: Normal rate and regular rhythm. Pulmonary:      Effort: Pulmonary effort is normal.      Breath sounds: Normal breath sounds. Abdominal:      General: There is no distension. Musculoskeletal:         General: Tenderness present. No deformity. Comments: Proprioception intact neuro intact no loss of bowel or bladder control hypertonicity over right lower back no midline tenderness   Skin:     General: Skin is warm and dry. Findings: No erythema. Neurological:      Mental Status: He is alert and oriented to person, place, and time. Psychiatric:         Behavior: Behavior normal.         DIFFERENTIAL  DIAGNOSIS     PLAN (LABS / IMAGING / EKG):  Orders Placed This Encounter   Procedures    POCT Glucose    POC Glucose Fingerstick       MEDICATIONS ORDERED:  Orders Placed This Encounter   Medications    lidocaine 1 % injection 20 mL    ibuprofen (ADVIL;MOTRIN) tablet 800 mg    diazePAM (VALIUM) 5 MG tablet     Sig: Take 1 tablet by mouth every 8 hours as needed (muslce strain) for up to 10 days.      Dispense:  5 tablet     Refill:  0     Do not drive while taking this medication         DIAGNOSTIC RESULTS / EMERGENCY DEPARTMENT COURSE / MDM     LABS:  Results for orders placed or performed during the hospital

## 2020-06-29 ENCOUNTER — HOSPITAL ENCOUNTER (EMERGENCY)
Age: 30
Discharge: HOME | End: 2020-06-29
Attending: EMERGENCY MEDICINE
Payer: MEDICARE

## 2020-06-29 VITALS
TEMPERATURE: 98.2 F | BODY MASS INDEX: 25.9 KG/M2 | OXYGEN SATURATION: 97 % | WEIGHT: 165 LBS | HEART RATE: 90 BPM | RESPIRATION RATE: 18 BRPM | SYSTOLIC BLOOD PRESSURE: 155 MMHG | DIASTOLIC BLOOD PRESSURE: 104 MMHG | HEIGHT: 67 IN

## 2020-06-29 PROCEDURE — 6370000000 HC RX 637 (ALT 250 FOR IP): Performed by: STUDENT IN AN ORGANIZED HEALTH CARE EDUCATION/TRAINING PROGRAM

## 2020-06-29 PROCEDURE — 99282 EMERGENCY DEPT VISIT SF MDM: CPT

## 2020-06-29 RX ORDER — LIDOCAINE 50 MG/G
1 PATCH TOPICAL DAILY
Qty: 30 PATCH | Refills: 0 | Status: SHIPPED | OUTPATIENT
Start: 2020-06-29 | End: 2020-07-20

## 2020-06-29 RX ORDER — IBUPROFEN 800 MG/1
800 TABLET ORAL 2 TIMES DAILY PRN
Qty: 15 TABLET | Refills: 0 | Status: SHIPPED | OUTPATIENT
Start: 2020-06-29 | End: 2020-07-20

## 2020-06-29 RX ORDER — LIDOCAINE 4 G/G
1 PATCH TOPICAL DAILY
Status: DISCONTINUED | OUTPATIENT
Start: 2020-06-29 | End: 2020-06-29 | Stop reason: HOSPADM

## 2020-06-29 RX ORDER — IBUPROFEN 800 MG/1
800 TABLET ORAL ONCE
Status: COMPLETED | OUTPATIENT
Start: 2020-06-29 | End: 2020-06-29

## 2020-06-29 RX ADMIN — IBUPROFEN 800 MG: 800 TABLET, FILM COATED ORAL at 15:05

## 2020-06-29 ASSESSMENT — PAIN SCALES - GENERAL
PAINLEVEL_OUTOF10: 8
PAINLEVEL_OUTOF10: 8

## 2020-06-29 ASSESSMENT — ENCOUNTER SYMPTOMS
NAUSEA: 0
VOMITING: 0
BACK PAIN: 1
RHINORRHEA: 0
SHORTNESS OF BREATH: 0
ABDOMINAL PAIN: 0

## 2020-06-29 ASSESSMENT — PAIN DESCRIPTION - PAIN TYPE: TYPE: ACUTE PAIN

## 2020-06-29 ASSESSMENT — PAIN DESCRIPTION - ORIENTATION: ORIENTATION: LOWER

## 2020-06-29 ASSESSMENT — PAIN DESCRIPTION - DESCRIPTORS: DESCRIPTORS: ACHING

## 2020-06-29 ASSESSMENT — PAIN DESCRIPTION - FREQUENCY: FREQUENCY: CONTINUOUS

## 2020-06-29 ASSESSMENT — PAIN DESCRIPTION - LOCATION: LOCATION: BACK

## 2020-06-29 NOTE — ED PROVIDER NOTES
[facility]  ED  Emergency Department Encounter  EmergencyMedicine Resident     Pt Name:Vaibhav Costa  MRN: [id number]  [place] 1990  Date of evaluation: 20  PCP:  Carmen Love MD    [address] [place] [address]       Chief Complaint   Patient presents with    Back Pain       HISTORY OF PRESENT ILLNESS  (Location/Symptom, Timing/Onset, Context/Setting, Quality, Duration, Modifying Factors, Severity.)      Alexis Baldwin is a 27 y.o. male who presents with right lower lumbar muscle strain. Patient was seen and evaluated here on 2020 diagnosed with a strain of the lumbar region and discharged with a prescription for Valium as well as instructions to follow-up with his primary care physician and he has an appointment scheduled for approximately 1 week from now. Patient denies any trauma to the area he denies any loss of sensation any bowel or bladder incontinence he is able to ambulate is afebrile he denies any IV drug use or history of instrumentation on his back he has no saddle anesthesia. PAST MEDICAL / SURGICAL / SOCIAL / FAMILY HISTORY      has a past medical history of Diabetes mellitus ([facility] [address] HLD (hyperlipidemia), McArdle disease ([facility] [address] and Smoking. has no past surgical history on file.       Social History     Socioeconomic History    Marital status:      Spouse name: Not on file    Number of children: Not on file    Years of education: Not on file    Highest education level: Not on file   Occupational History    Not on file   Social Needs    Financial resource strain: Not on file    Food insecurity     Worry: Not on file     Inability: Not on file    Transportation needs     Medical: Not on file     Non-medical: Not on file   Tobacco Use    Smoking status: Current Every Day Smoker     Packs/day: 0.50     Years: 10.00     Pack years: 5.00     Types: Cigarettes     Last attempt to quit: 2013     Years since quittin.9    Smokeless tobacco: Never Used Negative for rash and wound. Allergic/Immunologic: Negative for environmental allergies, food allergies and immunocompromised state. Neurological: Negative for tremors, weakness, numbness and headaches. Hematological: Negative for adenopathy. Psychiatric/Behavioral: Negative for agitation. PHYSICAL EXAM   (up to 7 for level 4, 8 or more for level 5)      INITIAL VITALS:   BP (!) 155/104   Pulse 90   Temp 98.2 °F (36.8 °C) (Oral)   Resp 18   Ht 5' 7\" (1.702 m)   Wt 165 lb (74.8 kg)   SpO2 97%   BMI 25.84 kg/m²     Physical Exam  Vitals signs and nursing note reviewed. Constitutional:       Appearance: Normal appearance. He is normal weight. He is not diaphoretic. HENT:      Head: Normocephalic and atraumatic. Right Ear: External ear normal.      Left Ear: External ear normal.      Nose: Nose normal.      Mouth/Throat:      Mouth: Mucous membranes are moist.   Eyes:      General: No scleral icterus. Right eye: No discharge. Left eye: No discharge. Extraocular Movements: Extraocular movements intact. Conjunctiva/sclera: Conjunctivae normal.      Pupils: Pupils are equal, round, and reactive to light. Neck:      Musculoskeletal: Normal range of motion. Cardiovascular:      Rate and Rhythm: Normal rate. Pulmonary:      Effort: Pulmonary effort is normal. No respiratory distress. Breath sounds: Normal breath sounds. Abdominal:      Tenderness: There is no right CVA tenderness, left CVA tenderness or guarding. Musculoskeletal: Normal range of motion. General: Tenderness present. No swelling, deformity or signs of injury. Right lower leg: No edema. Left lower leg: No edema. Skin:     General: Skin is warm. Capillary Refill: Capillary refill takes less than 2 seconds. Neurological:      General: No focal deficit present. Mental Status: He is alert and oriented to person, place, and time. GCS: GCS eye subscore is 4.  GCS

## 2020-06-29 NOTE — ED PROVIDER NOTES
[facility]     Emergency Department     Faculty Attestation    I performed a history and physical examination of the patient and discussed management with the resident. I have reviewed and agree with the residents findings including all diagnostic interpretations, and treatment plans as written at the time of my review. Any areas of disagreement are noted on the chart. I was personally present for the key portions of any procedures. I have documented in the chart those procedures where I was not present during the key portions. For Physician Assistant/ Nurse Practitioner cases/documentation I have personally evaluated this patient and have completed at least one if not all key elements of the E/M (history, physical exam, and MDM). Additional findings are as noted. This patient was evaluated in the Emergency Department for symptoms described in the history of present illness. The patient was evaluated in the context of the global COVID-19 pandemic, which necessitated consideration that the patient might be at risk for infection with the SARS-CoV-2 virus that causes COVID-19. Institutional protocols and algorithms that pertain to the evaluation of patients at risk for COVID-19 are in a state of rapid change based on information released by regulatory bodies including the CDC and federal and state organizations. These policies and algorithms were followed during the patient's care in the ED. Primary Care Physician: Kristyn Darden MD    History: This is a 27 y.o. male who presents to the Emergency Department with complaint of back pain. The patient presents emergent complaint of back pain for which she was seen 4 days ago. He says he was prescribed Valium which did not significant improve his symptoms. He denies any trauma. He denies any bowel or bladder dysfunction. Denies any numbness, tingling or weakness.   Patient denies any IV drug abuse.    Physical:   height is 5' 7\" (1.702 m) and weight is 165 lb (74.8 kg). His oral temperature is 98.2 °F (36.8 °C). His blood pressure is 155/104 (abnormal) and his pulse is 90. His respiration is 18 and oxygen saturation is 97%. There is no midline thoracic or lumbar spinal tenderness. He does have some tenderness to palpation in the right paraspinal lumbar region. Abdomen is soft nontender no pulsatile abdominal mass patient is able ambulate without any difficulty. Impression: Low back pain    Plan: Lidocaine patch, Motrin, DC with PCP follow-up    (Please note that portions of this note were completed with a voice recognition program.  Efforts were made to edit the dictations but occasionally words are mis-transcribed.)    Carl Miner.  Willie Jimenez MD, [facility]  Attending Emergency Medicine Physician        Ricky Jason MD  06/29/20 [id number]

## 2020-07-02 RX ORDER — LANCETS 33 GAUGE
EACH MISCELLANEOUS
Qty: 100 EACH | Refills: 3 | Status: SHIPPED | OUTPATIENT
Start: 2020-07-02 | End: 2021-03-01 | Stop reason: SDUPTHER

## 2020-07-02 NOTE — TELEPHONE ENCOUNTER
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 If I can be of assistance, please route to the applicable pool. Thank you.

## 2020-07-20 ENCOUNTER — HOSPITAL ENCOUNTER (EMERGENCY)
Age: 30
Discharge: HOME | End: 2020-07-21
Attending: EMERGENCY MEDICINE
Payer: MEDICARE

## 2020-07-20 VITALS
SYSTOLIC BLOOD PRESSURE: 159 MMHG | OXYGEN SATURATION: 99 % | TEMPERATURE: 99 F | DIASTOLIC BLOOD PRESSURE: 92 MMHG | RESPIRATION RATE: 16 BRPM | WEIGHT: 175 LBS | HEART RATE: 97 BPM | BODY MASS INDEX: 28.12 KG/M2 | HEIGHT: 66 IN

## 2020-07-20 PROCEDURE — 6370000000 HC RX 637 (ALT 250 FOR IP): Performed by: STUDENT IN AN ORGANIZED HEALTH CARE EDUCATION/TRAINING PROGRAM

## 2020-07-20 PROCEDURE — 99283 EMERGENCY DEPT VISIT LOW MDM: CPT

## 2020-07-20 RX ORDER — ACETAMINOPHEN 325 MG/1
650 TABLET ORAL EVERY 6 HOURS PRN
Qty: 30 TABLET | Refills: 0 | Status: SHIPPED | OUTPATIENT
Start: 2020-07-20 | End: 2020-07-20 | Stop reason: SDUPTHER

## 2020-07-20 RX ORDER — IBUPROFEN 600 MG/1
600 TABLET ORAL EVERY 6 HOURS PRN
Qty: 30 TABLET | Refills: 0 | Status: SHIPPED | OUTPATIENT
Start: 2020-07-20 | End: 2020-11-10 | Stop reason: SDUPTHER

## 2020-07-20 RX ORDER — IBUPROFEN 600 MG/1
600 TABLET ORAL EVERY 6 HOURS PRN
Qty: 30 TABLET | Refills: 0 | Status: SHIPPED | OUTPATIENT
Start: 2020-07-20 | End: 2020-07-20 | Stop reason: SDUPTHER

## 2020-07-20 RX ORDER — ACETAMINOPHEN 325 MG/1
650 TABLET ORAL EVERY 6 HOURS PRN
Qty: 30 TABLET | Refills: 0 | Status: SHIPPED | OUTPATIENT
Start: 2020-07-20 | End: 2020-11-10 | Stop reason: SDUPTHER

## 2020-07-20 RX ORDER — ACETAMINOPHEN 325 MG/1
650 TABLET ORAL ONCE
Status: COMPLETED | OUTPATIENT
Start: 2020-07-20 | End: 2020-07-20

## 2020-07-20 RX ORDER — LIDOCAINE 50 MG/G
1 PATCH TOPICAL DAILY
Qty: 10 PATCH | Refills: 0 | Status: SHIPPED | OUTPATIENT
Start: 2020-07-20 | End: 2020-07-20 | Stop reason: SDUPTHER

## 2020-07-20 RX ORDER — LIDOCAINE 50 MG/G
1 PATCH TOPICAL DAILY
Qty: 10 PATCH | Refills: 0 | Status: SHIPPED | OUTPATIENT
Start: 2020-07-20 | End: 2020-07-30

## 2020-07-20 RX ORDER — IBUPROFEN 400 MG/1
400 TABLET ORAL ONCE
Status: COMPLETED | OUTPATIENT
Start: 2020-07-20 | End: 2020-07-20

## 2020-07-20 RX ADMIN — ACETAMINOPHEN 650 MG: 325 TABLET ORAL at 23:25

## 2020-07-20 RX ADMIN — IBUPROFEN 400 MG: 400 TABLET, FILM COATED ORAL at 23:25

## 2020-07-20 ASSESSMENT — PAIN SCALES - GENERAL: PAINLEVEL_OUTOF10: 2

## 2020-07-21 ENCOUNTER — TELEPHONE (OUTPATIENT)
Dept: FAMILY MEDICINE CLINIC | Age: 30
End: 2020-07-21

## 2020-07-21 NOTE — ED PROVIDER NOTES
[facility]     Emergency Department     Faculty Attestation    I performed a history and physical examination of the patient and discussed management with the resident. I reviewed the residents note and agree with the documented findings and plan of care. Any areas of disagreement are noted on the chart. I was personally present for the key portions of any procedures. I have documented in the chart those procedures where I was not present during the key portions. I have reviewed the emergency nurses triage note. I agree with the chief complaint, past medical history, past surgical history, allergies, medications, social and family history as documented unless otherwise noted below. For Physician Assistant/ Nurse Practitioner cases/documentation I have personally evaluated this patient and have completed at least one if not all key elements of the E/M (history, physical exam, and MDM). Additional findings are as noted. Primary Care Physician:  Raquel Kessler MD    CHIEF COMPLAINT       Chief Complaint   Patient presents with    Hand Pain       RECENT VITALS:   Temp: 99 °F (37.2 °C),  Pulse: 97, Resp: 16, BP: (!) 159/92    LABS:  Labs Reviewed - No data to display    Radiology  No orders to display         Attending Physician Additional  Notes    The patient is a 61-year-old male who presents for evaluation of muscle spasms to his bilateral forearms. Patient reports that he works as a  and often has repetitive movements with sweeping that puts pressure on his forearms. He states that just prior to arrival he began having spasm to his bilateral forearms that lasted less than 30 minutes before resolving spontaneously. He states that it has happened to him in the past.  He has not taken any medications for his symptoms and does not list any palliating factors. His symptoms are reproducible when he is sleeping. He denies any previous injury or surgery to his arms.   He is right-hand dominant. The patient denies any other physical complaints. Vital signs are stable. Heart regular rate and rhythm. Lungs clear to auscultation. Abdomen soft nontender. No tenderness palpation over the shoulders, elbows, forearms, wrists or hands. Radial, ulnar and median nerves intact to the bilateral upper extremities. Able to perform intrinsic movements of the hand without difficulty. Normal  strength bilaterally. No snuff box tenderness. Radial pulses 2+/4. Capillary refill less than 2 seconds. I suspect his symptoms are secondary to muscle spasm from overuse. I instructed him to take ibuprofen or Tylenol as needed for pain and to follow-up with his PCP in 1 to 2 days. I instructed him to stay well-hydrated and to return to the ED for worsening symptoms or any other concern.             Galdino Gutierrez DO  Attending Emergency Physician            Galdino Gutierrez DO  07/21/20 [id number]

## 2020-07-21 NOTE — ED PROVIDER NOTES
[facility]  Emergency Department Encounter  Emergency Medicine Resident         This patient was evaluated in the Emergency Department for symptoms described in the history of present illness. He/she was evaluated in the context of the global COVID-19 pandemic, which necessitated consideration that the patient might be at risk for infection with the SARS-CoV-2 virus that causes COVID-19. Institutional protocols and algorithms that pertain to the evaluation of patients at risk for COVID-19 are in a state of rapid change based on information released by regulatory bodies including the CDC and federal and state organizations. These policies and algorithms were followed during the patient's care in the ED. Pt Name: Jesus Muhammad  MRN: [id number]  [place] 1990  Date of evaluation: 7/20/20  PCP:  Reba Resendiz MD    [address] [place] [address]       Chief Complaint   Patient presents with    Hand Pain       HISTORY OF PRESENT ILLNESS  (Location/Symptom, Timing/Onset, Context/Setting, Quality, Duration, Modifying Factors, Severity.)    Jesus Muhammad is a 27 y.o. male who presents with hand spasms. 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 Creatine severity achy nonradiating. Has had this before and they have resolved on his own. Patient is currently not in any discomfort. No spasms currently. Denies numbness or weakness          PAST MEDICAL / SURGICAL / SOCIAL / FAMILY HISTORY    has a past medical history of Diabetes mellitus ([address] HLD (hyperlipidemia), McArdle disease ([address] and Smoking.     Denies past surgical history    Social History     Socioeconomic History    Marital status:      Spouse name: Not on file    Number of children: Not on file    Years of education: Not on file    Highest education level: Not on file   Occupational History    Not on file   Social Needs    Financial resource strain: Not on file    Food insecurity     Worry: Not on file     Inability: Not on file    Transportation needs     Medical: Not on file     Non-medical: Not on file   Tobacco Use    Smoking status: Current Every Day Smoker     Packs/day: 0.50     Years: 10.00     Pack years: 5.00     Types: Cigarettes     Last attempt to quit: 2013     Years since quittin.9    Smokeless tobacco: Never Used   Substance and Sexual Activity    Alcohol use: No    Drug use: Not Currently     Types: Marijuana     Comment: former marijuana    Sexual activity: Yes     Partners: Female   Lifestyle    Physical activity     Days per week: Not on file     Minutes per session: Not on file    Stress: Not on file   Relationships    Social connections     Talks on phone: Not on file     Gets together: Not on file     Attends [employer] service: Not on file     Active member of club or organization: Not on file     Attends meetings of clubs or organizations: Not on file     Relationship status: Not on file    Intimate partner violence     Fear of current or ex partner: Not on file     Emotionally abused: Not on file     Physically abused: Not on file     Forced sexual activity: Not on file   Other Topics Concern    Not on file   Social History Narrative    Not on file       Family History   Problem Relation Age of Onset    Diabetes Father     Other Father         PAD       Allergies:    Patient has no known allergies. Home Medications:  Prior to Admission medications    Medication Sig Start Date End Date Taking?  Authorizing Provider   acetaminophen (TYLENOL) 325 MG tablet Take 2 tablets by mouth every 6 hours as needed for Pain 20  Yes Lucie Macedo [place], DO   ibuprofen (ADVIL;MOTRIN) 600 MG tablet Take 1 tablet by mouth every 6 hours as needed for Pain 20  Yes Lucie Brewster, DO   lidocaine (LIDODERM) 5 % Place 1 patch onto the skin daily for 10 days 12 hours on, 12 hours off. 20 Yes Franklyn Brewster,    Insulin Pen Needle (COMFORT EZ PEN NEEDLES) 32G X 6 MM MISC USE AS DIRECTED WITH INSULIN PENS (UP TO 3 TIMES DAILY) 7/2/20   Chava Ackerman MD   insulin glargine (BASAGLAR KWIKPEN) 100 UNIT/ML injection pen Inject 10 Units into the skin nightly 6/19/20   Andrea Powell MD   insulin aspart (NOVOLOG) 100 UNIT/ML injection pen Inject 5 Units into the skin 3 times daily (before meals) 6/19/20   Andrea Powell MD   pantoprazole (PROTONIX) 20 MG tablet TAKE 1 TABLET BY MOUTH DAILY TAKE 30 MINS BEFORE BREAKFAST DAILY 6/16/20   Chava Ackerman MD   ondansetron (ZOFRAN) 4 MG tablet Take 1 tablet by mouth daily as needed for Nausea or Vomiting 6/11/20   Andrea Powell MD   Lancets MISC 1 each by Does not apply route daily Use___times daily    Diagnisis:250.0  Diabetes Mellitus____Insulin Dependent___Non-Insulin Dependent 5/18/20   Joaquim Louie MD   Alcohol Swabs (ALCOHOL PREP) 70 % PADS 1 box by Does not apply route 3 times daily (with meals) Use_____times per day  Diagnosis: 250.0   Diabetes ___Insulin-Dependent___Non-Insulin Dependent 5/18/20   Joaquim Louie MD   blood glucose monitor strips 1 strip by Other route 3 times daily (with meals) Test___times daily    Diagnosis: 250.0   Diabetes Mellitus____Insulin Dependent____Non-Insulin Dependent 5/18/20   Joaquim Louie MD   glucose monitoring kit (FREESTYLE) monitoring kit 1 kit by Does not apply route daily 5/16/20   Jacquie Augustine MD   lisinopril (PRINIVIL;ZESTRIL) 10 MG tablet Take 1 tablet by mouth daily 5/16/20   Jacquie Augustine MD   ondansetron (ZOFRAN ODT) 4 MG disintegrating tablet Take 1 tablet by mouth every 8 hours as needed for Nausea or Vomiting 5/16/20   Jacquie Augustine MD   albuterol sulfate HFA (PROAIR HFA) 108 (90 Base) MCG/ACT inhaler Inhale 2 puffs into the lungs every 4 hours as needed for Wheezing 9/6/19   Jame Hernadez PA-C       REVIEW OF SYSTEMS    (2-9 systems for level 4, 10 or more for level 5)    + Muscle spasm  Gen: No Fever, No chills  EYES: No blurry visiion, no double vision  HENT: No sore throat, No runny nose. No cough  CV: No CP , No palpitation  RESP: No SOB, No respiratory distress  GI: No N/V, No Abdm pain  : No dysuria  SKIN: No rash  MSK: No back pain, no joint pain  NEURO: No HA, no weakness  PSYCH: No SI/HI        PHYSICAL EXAM   (up to 7 for level 4, 8 or more for level 5)     INITIAL VITALS:     BP (!) 159/92   Pulse 97   Temp 99 °F (37.2 °C) (Oral)   Resp 16   Ht 5' 6\" (1.676 m)   Wt 175 lb (79.4 kg)   SpO2 99%   BMI 28.25 kg/m²     Physical Exam  GENERAL: Not in acute distress, well developed, not diaphoretic  HENT: normocephalic, nose nml  EYES: No sclearal icterus, no occular discharge  NECK: No JVD, trachea midline  PULM: Effort normal, no audible wheezing or stridor  NEURO: Alert, awake, responsive  MSK: Very minimal focal tenderness to palpation in the muscle belly on volar surface of bilateral forearms. No spasm. Neurovascularly intact with +2 radial pulses. Sensation is intact distally. 5 out of 5 strength to handgrip bilaterally. No rash. DIFFERENTIAL  DIAGNOSIS/ MDM   PLAN (LABS / IMAGING / EKG):  No orders of the defined types were placed in this encounter. MEDICATIONS ORDERED:  Orders Placed This Encounter   Medications    acetaminophen (TYLENOL) 325 MG tablet     Sig: Take 2 tablets by mouth every 6 hours as needed for Pain     Dispense:  30 tablet     Refill:  0    ibuprofen (ADVIL;MOTRIN) 600 MG tablet     Sig: Take 1 tablet by mouth every 6 hours as needed for Pain     Dispense:  30 tablet     Refill:  0    lidocaine (LIDODERM) 5 %     Sig: Place 1 patch onto the skin daily for 10 days 12 hours on, 12 hours off. Dispense:  10 patch     Refill:  0    acetaminophen (TYLENOL) tablet 650 mg    ibuprofen (ADVIL;MOTRIN) tablet 400 mg         MDM:    Alexis Baldwin is a 27 y.o. male who presents with mm spasms in bilateral forearms. No sx currently.  neurovasc skin daily for 10 days 12 hours on, 12 hours off. Dr. Rufina Davis.  [facility]  Emergency Medicine Resident Physician, PGY-3    (Please note that portions of this note were completed with a voice recognition program.  Efforts were made to edit the dictations but occasionally words are mis-transcribed.)         Dedra Vazquez DO  Resident  07/20/20 9511

## 2020-07-21 NOTE — ED NOTES
Pt to ED with c/o bilateral hand pain with numbness and tingling. Pt reports it started about 20 mins ago when he was at work. Pt reports \"it was hurting and feeling like that up until now, but it just stopped so I don't know if I really need to be seen or not. \"  Pt denies any injury. Pt denies any other complaints at this time. Will continue to monitor.       Maria C Rodriguez RN  07/20/20 1861

## 2020-07-23 ENCOUNTER — TELEPHONE (OUTPATIENT)
Dept: FAMILY MEDICINE CLINIC | Age: 30
End: 2020-07-23

## 2020-07-23 RX ORDER — CYCLOBENZAPRINE HCL 10 MG
10 TABLET ORAL NIGHTLY PRN
Qty: 30 TABLET | Refills: 0 | Status: CANCELLED | OUTPATIENT
Start: 2020-07-23 | End: 2020-08-02

## 2020-07-27 RX ORDER — CYCLOBENZAPRINE HCL 5 MG
5 TABLET ORAL 2 TIMES DAILY PRN
Qty: 10 TABLET | Refills: 0 | Status: SHIPPED | OUTPATIENT
Start: 2020-07-27 | End: 2020-08-20

## 2020-07-28 ENCOUNTER — OFFICE VISIT (OUTPATIENT)
Dept: FAMILY MEDICINE CLINIC | Age: 30
End: 2020-07-28
Payer: MEDICARE

## 2020-07-28 VITALS
TEMPERATURE: 96.8 F | SYSTOLIC BLOOD PRESSURE: 150 MMHG | HEIGHT: 66 IN | DIASTOLIC BLOOD PRESSURE: 80 MMHG | HEART RATE: 78 BPM | WEIGHT: 155 LBS | BODY MASS INDEX: 24.91 KG/M2

## 2020-07-28 PROCEDURE — G8419 CALC BMI OUT NRM PARAM NOF/U: HCPCS | Performed by: STUDENT IN AN ORGANIZED HEALTH CARE EDUCATION/TRAINING PROGRAM

## 2020-07-28 PROCEDURE — 99213 OFFICE O/P EST LOW 20 MIN: CPT | Performed by: STUDENT IN AN ORGANIZED HEALTH CARE EDUCATION/TRAINING PROGRAM

## 2020-07-28 PROCEDURE — 4004F PT TOBACCO SCREEN RCVD TLK: CPT | Performed by: STUDENT IN AN ORGANIZED HEALTH CARE EDUCATION/TRAINING PROGRAM

## 2020-07-28 PROCEDURE — G8427 DOCREV CUR MEDS BY ELIG CLIN: HCPCS | Performed by: STUDENT IN AN ORGANIZED HEALTH CARE EDUCATION/TRAINING PROGRAM

## 2020-07-28 PROCEDURE — 3046F HEMOGLOBIN A1C LEVEL >9.0%: CPT | Performed by: STUDENT IN AN ORGANIZED HEALTH CARE EDUCATION/TRAINING PROGRAM

## 2020-07-28 PROCEDURE — 2022F DILAT RTA XM EVC RTNOPTHY: CPT | Performed by: STUDENT IN AN ORGANIZED HEALTH CARE EDUCATION/TRAINING PROGRAM

## 2020-07-28 RX ORDER — PANTOPRAZOLE SODIUM 40 MG/1
40 TABLET, DELAYED RELEASE ORAL DAILY
Qty: 30 TABLET | Refills: 1 | Status: SHIPPED | OUTPATIENT
Start: 2020-07-28 | End: 2020-09-11 | Stop reason: SDUPTHER

## 2020-07-28 ASSESSMENT — ENCOUNTER SYMPTOMS
SHORTNESS OF BREATH: 0
CHEST TIGHTNESS: 0
BACK PAIN: 1

## 2020-07-28 NOTE — LETTER
[address]  [place] [address]  Phone: [phone]  Fax: [phone]    Axel Gaxiola MD        July 28, 2020     Patient: Aparna Treviño   YOB: 1990   Date of Visit: 7/28/2020       To Whom It May Concern: It is my medical opinion that Sherren Sneddon may return to full duty immediately with no restrictions. Mr Hemant Sainz was seen in our office 7/28/2020. If you have any questions or concerns, please don't hesitate to call.     Sincerely,        Axel Gaxiola MD

## 2020-07-28 NOTE — PROGRESS NOTES
Visit Information    Have you changed or started any medications since your last visit including any over-the-counter medicines, vitamins, or herbal medicines? no   Have you stopped taking any of your medications? Is so, why? -  no  Are you having any side effects from any of your medications? - no    Have you seen any other physician or provider since your last visit?  no   Have you had any other diagnostic tests since your last visit?  no  Have you been seen in the emergency room and/or had an admission in a hospital since we last saw you? Yes [facility]  Have you had your routine dental cleaning in the past 6 months?  no     Do you have an active MyChart account? If no, what is the barrier?   Yes    Patient Care Team:  Axel Gaxiola MD as PCP - General (Family Medicine)    Medical History Review  Past Medical, Family, and Social History reviewed and does not contribute to the patient presenting condition    Health Maintenance   Topic Date Due    Diabetic foot exam  05/01/2000    Diabetic retinal exam  05/01/2000    HIV screen  05/01/2005    Lipid screen  05/29/2014    Diabetic microalbuminuria test  01/24/2019    Annual Wellness Visit (AWV)  06/20/2019    Pneumococcal 0-64 years Vaccine (1 of 1 - PPSV23) 12/11/2020 (Originally 5/1/1996)    Hepatitis B vaccine (1 of 3 - Risk 3-dose series) 06/11/2021 (Originally 5/1/2009)    DTaP/Tdap/Td vaccine (1 - Tdap) 06/11/2021 (Originally 5/1/2009)    Flu vaccine (1) 09/01/2020    A1C test (Diabetic or Prediabetic)  09/11/2020    Potassium monitoring  05/05/2021    Creatinine monitoring  05/05/2021    Hepatitis A vaccine  Aged Out    Hib vaccine  Aged Out    Meningococcal (ACWY) vaccine  Aged Out    Varicella vaccine  Discontinued

## 2020-07-28 NOTE — PROGRESS NOTES
Subjective:    Estela Flor is a 27 y.o. male with  has a past medical history of Diabetes mellitus ([facility] [address] HLD (hyperlipidemia), McArdle disease ([address] and Smoking. Family History   Problem Relation Age of Onset    Diabetes Father     Other Father         PAD       Presented tot office today for:  Chief Complaint   Patient presents with    Spasms       HPI  Present 49-year-old male presenting with muscle spasms. Patient has a history of type 1 diabetes, McArdle's disease. For the past 1 month muscle spasms have been getting worse, patient works as a  and he has been having forearm spasm. History of type 1 diabetes. Last A1c 6/2020: 12.9  Followed with endocrinology in the past.  Review of Systems   Constitutional: Negative for chills, diaphoresis and fatigue. Eyes: Negative for visual disturbance. Respiratory: Negative for chest tightness and shortness of breath. Cardiovascular: Negative for chest pain and palpitations. Musculoskeletal: Positive for back pain. Muscle spasms   Neurological: Negative for weakness and headaches. Objective:    BP (!) 150/80   Pulse 78   Temp 96.8 °F (36 °C) (Temporal)   Ht 5' 6\" (1.676 m)   Wt 155 lb (70.3 kg)   BMI 25.02 kg/m²    BP Readings from Last 3 Encounters:   07/28/20 (!) 150/80   07/20/20 (!) 159/92   06/29/20 (!) 155/104     Physical Exam  Constitutional:       Appearance: Normal appearance. He is normal weight. Cardiovascular:      Rate and Rhythm: Normal rate and regular rhythm. Pulmonary:      Effort: Pulmonary effort is normal.      Breath sounds: Normal breath sounds. Musculoskeletal: Normal range of motion. Neurological:      Mental Status: He is alert.            Lab Results   Component Value Date    WBC 4.7 11/10/2019    HGB 12.4 (L) 11/10/2019    HCT 38.2 (L) 11/10/2019     11/10/2019    CHOL 172 05/29/2013    TRIG 58 05/29/2013    HDL 51 05/29/2013    ALT 26 02/09/2017    AST 25 02/09/2017     (L) 05/05/2020    K 3.8 05/05/2020    CL 97 (L) 05/05/2020    CREATININE 1.51 (H) 05/05/2020    BUN 16 05/05/2020    CO2 24 05/05/2020    TSH 0.58 08/14/2013    INR 1.0 03/23/2018    LABA1C 12.6 (A) 06/11/2020    LABMICR 779 (H) 01/24/2018     Lab Results   Component Value Date    CALCIUM 9.6 05/05/2020    PHOS 3.3 03/22/2018     Lab Results   Component Value Date    LDLCHOLESTEROL 109 (H) 05/29/2013       Assessment and Plan:    1. Muscle spasm  Likely secondary to McArdle disease  Instructed to follow-up with endocrinology  - Basic Metabolic Panel; Future  - Eusebia Medrano MD, Endocrinology, [place]    2. Type 1 diabetes mellitus without complication (HCC)  Follow-up in 3 months  - Microalbumin, Ur; Future  - Lipid Panel; Future  - Eusebia Medrano MD, Endocrinology, [place]    3. [facility] ([facility]  - Eusebia Medrano MD, Endocrinology, [place]    4. Gastroesophageal reflux disease without esophagitis  Increase Protonix to 40 mg daily  - pantoprazole (PROTONIX) 40 MG tablet; Take 1 tablet by mouth daily Take 30 mins before breakfast daily  Dispense: 30 tablet; Refill: 1          Requested Prescriptions     Signed Prescriptions Disp Refills    pantoprazole (PROTONIX) 40 MG tablet 30 tablet 1     Sig: Take 1 tablet by mouth daily Take 30 mins before breakfast daily       Medications Discontinued During This Encounter   Medication Reason    pantoprazole (PROTONIX) 20 MG tablet REORDER       Return in about 3 months (around 10/28/2020) for DM IZaina Esposito received counseling on the following healthy behaviors: nutrition, exercise and medication adherence  Reviewed prior labs and health maintenance  Continue current medications, diet and exercise. Discussed use, benefit, and side effects of prescribed medications. Barriers to medication compliance addressed. Patient given educational materials - see patient instructions  Was a self-tracking handout given in paper form or via [employer]?  No:    Requested Prescriptions Signed Prescriptions Disp Refills    pantoprazole (PROTONIX) 40 MG tablet 30 tablet 1     Sig: Take 1 tablet by mouth daily Take 30 mins before breakfast daily       All patient questions answered. Patient voiced understanding. Quality Measures    Body mass index is 25.02 kg/m². Elevated. Weight control planned discussed Healthy diet and regular exercise. BP: (!) 150/80 Blood pressure is normal. Treatment plan consists of No treatment change needed.     Lab Results   Component Value Date    LDLCHOLESTEROL 109 (H) 05/29/2013    (goal LDL reduction with dx if diabetes is 50% LDL reduction)      PHQ Scores 4/7/2020 2/18/2019   PHQ2 Score 0 0   PHQ9 Score 0 0     Interpretation of Total Score Depression Severity: 1-4 = Minimal depression, 5-9 = Mild depression, 10-14 = Moderate depression, 15-19 = Moderately severe depression, 20-27 = Severe depression

## 2020-07-29 ENCOUNTER — APPOINTMENT (OUTPATIENT)
Dept: GENERAL RADIOLOGY | Age: 30
End: 2020-07-29
Payer: MEDICARE

## 2020-07-29 ENCOUNTER — HOSPITAL ENCOUNTER (EMERGENCY)
Age: 30
Discharge: HOME | End: 2020-07-29
Attending: EMERGENCY MEDICINE
Payer: MEDICARE

## 2020-07-29 VITALS
SYSTOLIC BLOOD PRESSURE: 175 MMHG | RESPIRATION RATE: 16 BRPM | OXYGEN SATURATION: 97 % | HEART RATE: 94 BPM | DIASTOLIC BLOOD PRESSURE: 93 MMHG | TEMPERATURE: 98.3 F

## 2020-07-29 LAB
-: NORMAL
ABSOLUTE EOS #: 0.07 K/UL (ref 0–0.44)
ABSOLUTE IMMATURE GRANULOCYTE: <0.03 K/UL (ref 0–0.3)
ABSOLUTE LYMPH #: 1 K/UL (ref 1.1–3.7)
ABSOLUTE MONO #: 0.51 K/UL (ref 0.1–1.2)
ALLEN TEST: ABNORMAL
AMORPHOUS: NORMAL
ANION GAP SERPL CALCULATED.3IONS-SCNC: 19 MMOL/L (ref 9–17)
BACTERIA: NORMAL
BASOPHILS # BLD: 1 % (ref 0–2)
BASOPHILS ABSOLUTE: 0.03 K/UL (ref 0–0.2)
BETA-HYDROXYBUTYRATE: 3.36 MMOL/L (ref 0.02–0.27)
BILIRUBIN URINE: NEGATIVE
BUN BLDV-MCNC: 26 MG/DL (ref 6–20)
BUN/CREAT BLD: ABNORMAL (ref 9–20)
CALCIUM SERPL-MCNC: 10.1 MG/DL (ref 8.6–10.4)
CARBOXYHEMOGLOBIN: 1.3 % (ref 0–5)
CASTS UA: NORMAL /LPF (ref 0–8)
CHLORIDE BLD-SCNC: 92 MMOL/L (ref 98–107)
CO2: 20 MMOL/L (ref 20–31)
COLOR: YELLOW
COMMENT UA: ABNORMAL
CREAT SERPL-MCNC: 1.46 MG/DL (ref 0.7–1.2)
CRYSTALS, UA: NORMAL /HPF
DIFFERENTIAL TYPE: ABNORMAL
EOSINOPHILS RELATIVE PERCENT: 1 % (ref 1–4)
EPITHELIAL CELLS UA: NORMAL /HPF (ref 0–5)
FIO2: ABNORMAL
GFR AFRICAN AMERICAN: >60 ML/MIN
GFR NON-AFRICAN AMERICAN: 57 ML/MIN
GFR SERPL CREATININE-BSD FRML MDRD: ABNORMAL ML/MIN/{1.73_M2}
GFR SERPL CREATININE-BSD FRML MDRD: ABNORMAL ML/MIN/{1.73_M2}
GLUCOSE BLD-MCNC: 273 MG/DL (ref 75–110)
GLUCOSE BLD-MCNC: 487 MG/DL (ref 75–110)
GLUCOSE BLD-MCNC: 530 MG/DL (ref 70–99)
GLUCOSE BLD-MCNC: 554 MG/DL (ref 75–110)
GLUCOSE URINE: ABNORMAL
HCO3 VENOUS: 22.6 MMOL/L (ref 24–30)
HCT VFR BLD CALC: 41.6 % (ref 40.7–50.3)
HEMOGLOBIN: 13.7 G/DL (ref 13–17)
IMMATURE GRANULOCYTES: 0 %
KETONES, URINE: ABNORMAL
LEUKOCYTE ESTERASE, URINE: NEGATIVE
LIPASE: 11 U/L (ref 13–60)
LYMPHOCYTES # BLD: 16 % (ref 24–43)
MCH RBC QN AUTO: 32.5 PG (ref 25.2–33.5)
MCHC RBC AUTO-ENTMCNC: 32.9 G/DL (ref 28.4–34.8)
MCV RBC AUTO: 98.6 FL (ref 82.6–102.9)
METHEMOGLOBIN: ABNORMAL % (ref 0–1.5)
MODE: ABNORMAL
MONOCYTES # BLD: 8 % (ref 3–12)
MUCUS: NORMAL
NEGATIVE BASE EXCESS, VEN: 2.2 MMOL/L (ref 0–2)
NITRITE, URINE: NEGATIVE
NOTIFICATION TIME: ABNORMAL
NOTIFICATION: ABNORMAL
NRBC AUTOMATED: 0 PER 100 WBC
O2 DEVICE/FLOW/%: ABNORMAL
O2 SAT, VEN: 85 % (ref 60–85)
OTHER OBSERVATIONS UA: NORMAL
OXYHEMOGLOBIN: ABNORMAL % (ref 95–98)
PATIENT TEMP: 37
PCO2, VEN, TEMP ADJ: ABNORMAL MMHG (ref 39–55)
PCO2, VEN: 41.6 (ref 39–55)
PDW BLD-RTO: 13.2 % (ref 11.8–14.4)
PEEP/CPAP: ABNORMAL
PH UA: 5 (ref 5–8)
PH VENOUS: 7.36 (ref 7.32–7.42)
PH, VEN, TEMP ADJ: ABNORMAL (ref 7.32–7.42)
PLATELET # BLD: 260 K/UL (ref 138–453)
PLATELET ESTIMATE: ABNORMAL
PMV BLD AUTO: 9.3 FL (ref 8.1–13.5)
PO2, VEN, TEMP ADJ: ABNORMAL MMHG (ref 30–50)
PO2, VEN: 54 (ref 30–50)
POSITIVE BASE EXCESS, VEN: ABNORMAL MMOL/L (ref 0–2)
POTASSIUM SERPL-SCNC: 4.9 MMOL/L (ref 3.7–5.3)
PROTEIN UA: ABNORMAL
PSV: ABNORMAL
PT. POSITION: ABNORMAL
RBC # BLD: 4.22 M/UL (ref 4.21–5.77)
RBC # BLD: ABNORMAL 10*6/UL
RBC UA: NORMAL /HPF (ref 0–4)
RENAL EPITHELIAL, UA: NORMAL /HPF
RESPIRATORY RATE: ABNORMAL
SAMPLE SITE: ABNORMAL
SEG NEUTROPHILS: 74 % (ref 36–65)
SEGMENTED NEUTROPHILS ABSOLUTE COUNT: 4.68 K/UL (ref 1.5–8.1)
SET RATE: ABNORMAL
SODIUM BLD-SCNC: 131 MMOL/L (ref 135–144)
SPECIFIC GRAVITY UA: 1.03 (ref 1–1.03)
TEXT FOR RESPIRATORY: ABNORMAL
TOTAL HB: ABNORMAL G/DL (ref 12–16)
TOTAL RATE: ABNORMAL
TRICHOMONAS: NORMAL
TROPONIN INTERP: ABNORMAL
TROPONIN INTERP: ABNORMAL
TROPONIN T: ABNORMAL NG/ML
TROPONIN T: ABNORMAL NG/ML
TROPONIN, HIGH SENSITIVITY: 23 NG/L (ref 0–22)
TROPONIN, HIGH SENSITIVITY: 26 NG/L (ref 0–22)
TURBIDITY: CLEAR
URINE HGB: ABNORMAL
UROBILINOGEN, URINE: NORMAL
VT: ABNORMAL
WBC # BLD: 6.3 K/UL (ref 3.5–11.3)
WBC # BLD: ABNORMAL 10*3/UL
WBC UA: NORMAL /HPF (ref 0–5)
YEAST: NORMAL

## 2020-07-29 PROCEDURE — 85025 COMPLETE CBC W/AUTO DIFF WBC: CPT

## 2020-07-29 PROCEDURE — 6370000000 HC RX 637 (ALT 250 FOR IP): Performed by: EMERGENCY MEDICINE

## 2020-07-29 PROCEDURE — 36415 COLL VENOUS BLD VENIPUNCTURE: CPT

## 2020-07-29 PROCEDURE — 6360000002 HC RX W HCPCS: Performed by: STUDENT IN AN ORGANIZED HEALTH CARE EDUCATION/TRAINING PROGRAM

## 2020-07-29 PROCEDURE — 99285 EMERGENCY DEPT VISIT HI MDM: CPT

## 2020-07-29 PROCEDURE — 82947 ASSAY GLUCOSE BLOOD QUANT: CPT

## 2020-07-29 PROCEDURE — 82010 KETONE BODYS QUAN: CPT

## 2020-07-29 PROCEDURE — 93005 ELECTROCARDIOGRAM TRACING: CPT | Performed by: STUDENT IN AN ORGANIZED HEALTH CARE EDUCATION/TRAINING PROGRAM

## 2020-07-29 PROCEDURE — 6370000000 HC RX 637 (ALT 250 FOR IP): Performed by: STUDENT IN AN ORGANIZED HEALTH CARE EDUCATION/TRAINING PROGRAM

## 2020-07-29 PROCEDURE — 82805 BLOOD GASES W/O2 SATURATION: CPT

## 2020-07-29 PROCEDURE — 96375 TX/PRO/DX INJ NEW DRUG ADDON: CPT

## 2020-07-29 PROCEDURE — 81001 URINALYSIS AUTO W/SCOPE: CPT

## 2020-07-29 PROCEDURE — 83690 ASSAY OF LIPASE: CPT

## 2020-07-29 PROCEDURE — 96374 THER/PROPH/DIAG INJ IV PUSH: CPT

## 2020-07-29 PROCEDURE — 71045 X-RAY EXAM CHEST 1 VIEW: CPT

## 2020-07-29 PROCEDURE — 84484 ASSAY OF TROPONIN QUANT: CPT

## 2020-07-29 PROCEDURE — 80048 BASIC METABOLIC PNL TOTAL CA: CPT

## 2020-07-29 PROCEDURE — 2580000003 HC RX 258: Performed by: STUDENT IN AN ORGANIZED HEALTH CARE EDUCATION/TRAINING PROGRAM

## 2020-07-29 PROCEDURE — 2580000003 HC RX 258: Performed by: EMERGENCY MEDICINE

## 2020-07-29 PROCEDURE — 96372 THER/PROPH/DIAG INJ SC/IM: CPT

## 2020-07-29 RX ORDER — 0.9 % SODIUM CHLORIDE 0.9 %
1000 INTRAVENOUS SOLUTION INTRAVENOUS ONCE
Status: COMPLETED | OUTPATIENT
Start: 2020-07-29 | End: 2020-07-29

## 2020-07-29 RX ORDER — MAGNESIUM HYDROXIDE/ALUMINUM HYDROXICE/SIMETHICONE 120; 1200; 1200 MG/30ML; MG/30ML; MG/30ML
15 SUSPENSION ORAL ONCE
Status: COMPLETED | OUTPATIENT
Start: 2020-07-29 | End: 2020-07-29

## 2020-07-29 RX ORDER — PROMETHAZINE HYDROCHLORIDE 25 MG/ML
25 INJECTION, SOLUTION INTRAMUSCULAR; INTRAVENOUS ONCE
Status: COMPLETED | OUTPATIENT
Start: 2020-07-29 | End: 2020-07-29

## 2020-07-29 RX ORDER — ONDANSETRON 2 MG/ML
4 INJECTION INTRAMUSCULAR; INTRAVENOUS ONCE
Status: COMPLETED | OUTPATIENT
Start: 2020-07-29 | End: 2020-07-29

## 2020-07-29 RX ORDER — LIDOCAINE HYDROCHLORIDE 20 MG/ML
15 SOLUTION OROPHARYNGEAL
Status: DISCONTINUED | OUTPATIENT
Start: 2020-07-29 | End: 2020-07-30 | Stop reason: HOSPADM

## 2020-07-29 RX ADMIN — INSULIN HUMAN 10 UNITS: 100 INJECTION, SOLUTION PARENTERAL at 18:46

## 2020-07-29 RX ADMIN — PROMETHAZINE HYDROCHLORIDE 25 MG: 25 INJECTION INTRAMUSCULAR; INTRAVENOUS at 18:11

## 2020-07-29 RX ADMIN — ONDANSETRON 4 MG: 2 INJECTION INTRAMUSCULAR; INTRAVENOUS at 17:47

## 2020-07-29 RX ADMIN — ALUMINUM HYDROXIDE, MAGNESIUM HYDROXIDE, AND SIMETHICONE 15 ML: 200; 200; 20 SUSPENSION ORAL at 18:13

## 2020-07-29 RX ADMIN — INSULIN LISPRO 10 UNITS: 100 INJECTION, SOLUTION INTRAVENOUS; SUBCUTANEOUS at 20:07

## 2020-07-29 RX ADMIN — LIDOCAINE HYDROCHLORIDE 15 ML: 20 SOLUTION ORAL; TOPICAL at 18:13

## 2020-07-29 RX ADMIN — SODIUM CHLORIDE 1000 ML: 9 INJECTION, SOLUTION INTRAVENOUS at 20:07

## 2020-07-29 RX ADMIN — SODIUM CHLORIDE 1000 ML: 9 INJECTION, SOLUTION INTRAVENOUS at 18:14

## 2020-07-29 ASSESSMENT — PAIN SCALES - GENERAL: PAINLEVEL_OUTOF10: 9

## 2020-07-29 NOTE — PROGRESS NOTES
Attending Physician Statement    Wt Readings from Last 3 Encounters:   07/28/20 155 lb (70.3 kg)   07/20/20 175 lb (79.4 kg)   06/29/20 165 lb (74.8 kg)     Temp Readings from Last 3 Encounters:   07/28/20 96.8 °F (36 °C) (Temporal)   07/20/20 99 °F (37.2 °C) (Oral)   06/29/20 98.2 °F (36.8 °C) (Oral)     BP Readings from Last 3 Encounters:   07/28/20 (!) 150/80   07/20/20 (!) 159/92   06/29/20 (!) 155/104     Pulse Readings from Last 3 Encounters:   07/28/20 78   07/20/20 97   06/29/20 90         I have discussed the care of Jo Ann Vargas, including pertinent history and exam findings with the resident. I have reviewed the key elements of all parts of the encounter with the resident. I agree with the assessment, plan and orders as documented by the resident.   (GE Modifier)

## 2020-07-29 NOTE — ED PROVIDER NOTES
[facility]  ED  eMERGENCY dEPARTMENT eNCOUnter   Attending Attestation     Pt Name: Myla Huston  MRN: [id number]  [place] 1990  Date of evaluation: 7/29/20       Myla Huston is a 27 y.o. male who presents with Chest Pain; Shortness of Breath; and Emesis      History: Pt presents with chest pain, sob, vomiting. Pt has no other complaints. Pt is diabetic. Exam: HRRR, lungs CTABL, Abdomen soft and non tender. Pt well appearing. Concern for DKA vs viral syndrome. Possible discharge if improved and is not in DKA. I performed a history and physical examination of the patient and discussed management with the resident. I reviewed the residents note and agree with the documented findings and plan of care. Any areas of disagreement are noted on the chart. I was personally present for the key portions of any procedures. I have documented in the chart those procedures where I was not present during the key portions. I have personally reviewed all images and agree with the resident's interpretation. I have reviewed the emergency nurses triage note. I agree with the chief complaint, past medical history, past surgical history, allergies, medications, social and family history as documented unless otherwise noted below. Documentation of the HPI, Physical Exam and Medical Decision Making performed by medical students or scribes is based on my personal performance of the HPI, PE and MDM. For Phys Assistant/ Nurse Practitioner cases/documentation I have had a face to face evaluation of this patient and have completed at least one if not all key elements of the E/M (history, physical exam, and MDM). Additional findings are as noted. For APC cases I have personally evaluated and examined the patient in conjunction with the APC and agree with the treatment plan and disposition of the patient as recorded by the APC.     Jaden Sr MD  Attending Emergency  Physician        Chai Jay, MD  07/29/20 4058

## 2020-07-29 NOTE — ED PROVIDER NOTES
[facility] ED  Emergency Department Encounter  Emergency Medicine Resident     Pt Name: Rosa Landrum  MRN: [id number]  [place] 1990  Date of evaluation: 7/29/20  PCP:  Nathan Wang MD    [address] [place] [address]       Chief Complaint   Patient presents with    Chest Pain    Shortness of Breath    Emesis       HISTORY OFPRESENT ILLNESS  (Location/Symptom, Timing/Onset, Context/Setting, Quality, Duration, Modifying Factors,Severity.)      Rosa Landrum is a 27 y.o. male who presents with left-sided chest pain with associated nausea and emesis. Patient states he felt like he was having acid reflux and took some of his medications from home. Patient does have a history of diabetes, DKA, controls his diabetes with insulin. Patient states he checks his sugars approximately 3 times a day, however today the numbers were abnormal in the 250s. Patient states that the chest pain is been going on for approximately 10 hours it is not worsening or remitting. Patient reports an on the upper right side of his chest, nonexertional not associated with position. Patient has no cardiac history, he does take medications for hypertension, he has no familial cardiac history either. Patient admits to nausea with emesis, has a hard time catching his breath, denies any fever or chills. PAST MEDICAL / SURGICAL / SOCIAL / FAMILY HISTORY      has a past medical history of Diabetes mellitus ([address] HLD (hyperlipidemia), McArdle disease ([address] and Smoking. has no past surgical history on file.      Social History     Socioeconomic History    Marital status:      Spouse name: Not on file    Number of children: Not on file    Years of education: Not on file    Highest education level: Not on file   Occupational History    Not on file   Social Needs    Financial resource strain: Not on file    Food insecurity     Worry: Not on file     Inability: Not on file    Transportation needs     Medical: Not on file     Non-medical: Not on file   Tobacco Use    Smoking status: Current Every Day Smoker     Packs/day: 0.50     Years: 10.00     Pack years: 5.00     Types: Cigarettes     Last attempt to quit: 2013     Years since quittin.0    Smokeless tobacco: Never Used   Substance and Sexual Activity    Alcohol use: No    Drug use: Not Currently     Types: Marijuana     Comment: former marijuana    Sexual activity: Yes     Partners: Female   Lifestyle    Physical activity     Days per week: Not on file     Minutes per session: Not on file    Stress: Not on file   Relationships    Social connections     Talks on phone: Not on file     Gets together: Not on file     Attends Voodoo service: Not on file     Active member of club or organization: Not on file     Attends meetings of clubs or organizations: Not on file     Relationship status: Not on file    Intimate partner violence     Fear of current or ex partner: Not on file     Emotionally abused: Not on file     Physically abused: Not on file     Forced sexual activity: Not on file   Other Topics Concern    Not on file   Social History Narrative    Not on file       Family History   Problem Relation Age of Onset    Diabetes Father     Other Father         PAD        Allergies:  Patient has no known allergies. Home Medications:  Prior to Admission medications    Medication Sig Start Date End Date Taking?  Authorizing Provider   pantoprazole (PROTONIX) 40 MG tablet Take 1 tablet by mouth daily Take 30 mins before breakfast daily 20   Peter Diehl MD   cyclobenzaprine (FLEXERIL) 5 MG tablet TAKE 1 TABLET BY MOUTH 2 TIMES DAILY AS NEEDED FOR MUSCLE SPASMS 20  Peter Diehl MD   acetaminophen (TYLENOL) 325 MG tablet Take 2 tablets by mouth every 6 hours as needed for Pain 20   Ananya Vyas DO   ibuprofen (ADVIL;MOTRIN) 600 MG tablet Take 1 tablet by mouth every 6 hours as needed for Pain 20   Ananya Vyas DO   lidocaine (LIDODERM) 5 % Place 1 patch onto the skin daily for 10 days 12 hours on, 12 hours off. 7/20/20 7/30/20  Kristy Medina DO   Insulin Pen Needle (COMFORT EZ PEN NEEDLES) 32G X 6 MM MISC USE AS DIRECTED WITH INSULIN PENS (UP TO 3 TIMES DAILY) 7/2/20   Arnold Torres MD   insulin glargine (BASAGLAR KWIKPEN) 100 UNIT/ML injection pen Inject 10 Units into the skin nightly 6/19/20   Jose De Jesus Odell MD   insulin aspart (NOVOLOG) 100 UNIT/ML injection pen Inject 5 Units into the skin 3 times daily (before meals) 6/19/20   Jose De Jesus Odell MD   ondansetron (ZOFRAN) 4 MG tablet Take 1 tablet by mouth daily as needed for Nausea or Vomiting 6/11/20   Jose De Jesus Odell MD   Lancets MISC 1 each by Does not apply route daily Use___times daily    Diagnisis:250.0  Diabetes Mellitus____Insulin Dependent___Non-Insulin Dependent 5/18/20   Zabrina Chester MD   Alcohol Swabs (ALCOHOL PREP) 70 % PADS 1 box by Does not apply route 3 times daily (with meals) Use_____times per day  Diagnosis: 250.0   Diabetes ___Insulin-Dependent___Non-Insulin Dependent 5/18/20   Zabrina Chester MD   blood glucose monitor strips 1 strip by Other route 3 times daily (with meals) Test___times daily    Diagnosis: 250.0   Diabetes Mellitus____Insulin Dependent____Non-Insulin Dependent 5/18/20   Zabrina Chester MD   glucose monitoring kit (FREESTYLE) monitoring kit 1 kit by Does not apply route daily 5/16/20   Shwetha Zeng MD   lisinopril (PRINIVIL;ZESTRIL) 10 MG tablet Take 1 tablet by mouth daily 5/16/20   Shwetha Zeng MD   ondansetron (ZOFRAN ODT) 4 MG disintegrating tablet Take 1 tablet by mouth every 8 hours as needed for Nausea or Vomiting 5/16/20   Shwetha Zeng MD   albuterol sulfate HFA (PROAIR HFA) 108 (90 Base) MCG/ACT inhaler Inhale 2 puffs into the lungs every 4 hours as needed for Wheezing 9/6/19   BEBE Patton-YASHIRA       REVIEW OFSYSTEMS    (2-9 systems for level 4, 10 or more for level 5)      Constitutional ROS - No recent fevers, No recent chills, +nausea, +emesis   Neurological ROS - No Headache, No Syncope  Opthalmologic ROS- No eye pain, No vision changes   ENT ROS - No sore throat, No congestion  Respiratory ROS - No cough, No shortness of breath  Cardiovascular ROS - + chest pain, No palpitations   Gastrointestinal ROS - No abdominal pain, No nausea, No vomiting  Genito-Urinary ROS - No dysuria, Nohematuria  Musculoskeletal ROS - No back pain, No neck pain  Dermatological ROS - No wound, No rash  PHYSICAL EXAM   (up to 7 for level 4, 8 or more forlevel 5)      INITIAL VITALS:   ED Triage Vitals   BP Temp Temp src Pulse Resp SpO2 Height Weight   -- -- -- -- -- -- -- --       Physical Exam  Constitutional:       Comments: Ill appearing, non toxic, clammy appearing skin    HENT:      Head: Normocephalic and atraumatic. Nose: Nose normal.      Mouth/Throat:      Mouth: Mucous membranes are dry. Eyes:      Pupils: Pupils are equal, round, and reactive to light. Neck:      Musculoskeletal: Normal range of motion and neck supple. Cardiovascular:      Rate and Rhythm: Normal rate and regular rhythm. Pulses: Normal pulses. Heart sounds: Normal heart sounds. No murmur. No friction rub. Pulmonary:      Effort: Pulmonary effort is normal. No respiratory distress. Breath sounds: Normal breath sounds. No stridor. Abdominal:      General: Bowel sounds are normal. There is no distension. Palpations: Abdomen is soft. Tenderness: There is no abdominal tenderness. Comments: Non tender to palpation    Musculoskeletal: Normal range of motion. General: No deformity. Skin:     General: Skin is warm and dry. Capillary Refill: Capillary refill takes less than 2 seconds. Neurological:      Mental Status: He is alert and oriented to person, place, and time.    Psychiatric:         Behavior: Behavior normal.         DIFFERENTIAL  DIAGNOSIS     PLAN (LABS / IMAGING / EKG):  Orders Placed This Encounter Procedures    XR CHEST PORTABLE    Troponin    CBC Auto Differential    Basic Metabolic Panel    Beta-Hydroxybutyrate    Urinalysis Reflex to Culture    Blood Gas, Venous    Lipase    Microscopic Urinalysis    POC Glucose Fingerstick    POC Glucose Fingerstick    POCT Glucose    POC Glucose Fingerstick    EKG 12 Lead       MEDICATIONS ORDERED:  Orders Placed This Encounter   Medications    ondansetron (ZOFRAN) injection 4 mg    0.9 % sodium chloride bolus    lidocaine viscous hcl (XYLOCAINE) 2 % solution 15 mL    aluminum & magnesium hydroxide-simethicone (MAALOX) 200-200-20 MG/5ML suspension 15 mL    promethazine (PHENERGAN) injection 25 mg    insulin regular (HUMULIN R;NOVOLIN R) injection 10 Units    0.9 % sodium chloride bolus    insulin lispro (HUMALOG) injection vial 10 Units       DDX: DKA, HHS, ACS, angina, peptic ulcer disease,  Gastric ulcer, gastric reflux, urinary tract infection, pneumonia, costochondritis, pleural effusion, pericardial effusion    Initial MDM/Plan: 27 y.o. male who presents with concern for right-sided chest pain with persistent nausea and emesis. Patient states his been going on for about 10 hours and is been a unable to eat or drink. Patient states he is attempted to eat or drink but he vomits everything back up. Patient does have a history of diabetes, insulin, DKA. Patient states that he checks his glucose regularly, however today his numbers have been irregular. Patient does report some abdominal discomfort, has a benign abdominal exam.  Patient is curled on his side in the room, appears uncomfortable but is nontoxic appearing. Patient denies any cardiac problems with his mom or dad. There is concern that patient has an HHS versus DKA versus aortic dissection versus ACS type picture. We will do a cardiac work-up to include EKG, chest x-ray, troponins. We will also do an abdominal work-up to include CBC, BMP, LFTs, lipase, lactic acid.   VBG and All other components within normal limits   POC GLUCOSE FINGERSTICK - Abnormal; Notable for the following components:    POC Glucose 273 (*)     All other components within normal limits   MICROSCOPIC URINALYSIS   POCT GLUCOSE         RADIOLOGY:  Xr Chest Portable    Result Date: 7/29/2020  EXAMINATION: ONE XRAY VIEW OF THE CHEST 7/29/2020 7:00 pm COMPARISON: November 10, 2019 HISTORY: ORDERING SYSTEM PROVIDED HISTORY: mid epigastric pain TECHNOLOGIST PROVIDED HISTORY: mid epigastric pain Reason for Exam: chest pain, heartburn   upright port FINDINGS: Lungs are clear. No cardiomegaly. No pulmonary edema. Negative chest radiograph. EKG  EKG Interpretation    Interpreted by me    Rhythm: normal sinus   Rate: normal  Axis: normal  Ectopy: none  Conduction: normal  ST Segments: no acute change  T Waves: no acute change  Q Waves: none    Clinical Impression: no acute changes and normal EKG    All EKG's are interpreted by the Emergency Department Physicianwho either signs or Co-signs this chart in the absence of a cardiologist.    EMERGENCY DEPARTMENT COURSE:  ED Course as of Jul 29 2138   Wed Jul 29, 2020   1800 Patient complained of midepigastric gnawing-like pain, will give GI cocktail and start IV fluids. [TRENTON]   9342 Patient found to have initial glucose of 530, 1 L normal saline started, 10 units IV insulin started. Patient's potassium was 4.9. Patient is likely an HHS. Patient was given IM Phenergan to alleviate vomiting. [TRENTON]   2119 Patient received 2 L normal saline fluid, 10 units IV insulin with 10 units subcu insulin, patient has glucose of 273, patient states that he is feeling much better, no more nausea and is tolerating fluids. [TRENTON]   2120 Patient states that he was recently seen at his primary care physician and his insulin regimen was adjusted, he will contact his primary care physician regarding today's visit.     [TRENTON]      ED Course User Index  [TRENTON] Lillie Paz DO PROCEDURES:  None    CONSULTS:  None    CRITICAL CARE:  Please see attending note    FINAL IMPRESSION      1. Nausea    2. Chest pain, unspecified type    3.  Hyperglycemia         DISPOSITION / PLAN     DISPOSITION        PATIENT REFERRED TO:  Karlos Almonte MD  [place] [address]  [phone]    Call in 1 day  for evaluation of isnulin regimen    [facility] ED  [address] [place] [address]  [phone]  Call   As needed      DISCHARGE MEDICATIONS:  New Prescriptions    No medications on file       Joellen Berry DO  Emergency Medicine Resident    (Please note that portions of this note were completed with a voice recognition program.Efforts were made to edit the dictations but occasionally words are mis-transcribed.)        Joellen Berry DO  Resident  07/29/20 7550

## 2020-07-29 NOTE — ED NOTES
Pt holding his chest and tearful from pain, Dr. Gabby Hernandez aware.      Jasmina Lopez, RN  07/29/20 9678

## 2020-07-29 NOTE — ED PROVIDER NOTES
[facility] [address] [facility]  Emergency Department  Faculty Sign-Out Addendum     Care of Yvonne Peacock was assumed from previous attending and is being seen for Chest Pain; Shortness of Breath; and Emesis  . The patient's initial evaluation and plan have been discussed with the prior provider who initially evaluated the patient. Handoff taken on the following patient from prior Attending Physician:    Akil Santo    I was available and discussed any additional care issues that arose and coordinated the management plans with the resident(s) caring for the patient during my duty period. Any areas of disagreement with residents documentation of care or procedures are noted on the chart. I was personally present for the key portions of any/all procedures during my duty period. I have documented in the chart those procedures where I was not present during the key portions. EMERGENCY DEPARTMENT COURSE / MEDICAL DECISION MAKING:       MEDICATIONS GIVEN:  No orders of the defined types were placed in this encounter. LABS / RADIOLOGY:     Labs Reviewed - No data to display    No results found. RECENT VITALS:     Temp: 98.3 °F (36.8 °C),  Pulse: 85, Resp: 13, BP: (!) 160/81, SpO2: 98 %    This patient is a 27 y.o. Male with chest pain. SOB and vomiting associated. Cardiac workup in progress. History of DKA, checking Beta-hydroxy    OUTSTANDING TASKS / RECOMMENDATIONS:    1.  Cardiac workup      Ronald Forbes MD, [facility]  Attending Emergency Physician  [facility] [facility]       Jorge Luis Argueta MD  07/29/20 9909

## 2020-07-30 ENCOUNTER — CARE COORDINATION (OUTPATIENT)
Dept: CARE COORDINATION | Age: 30
End: 2020-07-30

## 2020-07-30 LAB
EKG ATRIAL RATE: 80 BPM
EKG P AXIS: 67 DEGREES
EKG P-R INTERVAL: 122 MS
EKG Q-T INTERVAL: 364 MS
EKG QRS DURATION: 92 MS
EKG QTC CALCULATION (BAZETT): 419 MS
EKG R AXIS: 17 DEGREES
EKG T AXIS: 34 DEGREES
EKG VENTRICULAR RATE: 80 BPM

## 2020-07-30 PROCEDURE — 93010 ELECTROCARDIOGRAM REPORT: CPT | Performed by: INTERNAL MEDICINE

## 2020-07-30 NOTE — CARE COORDINATION
Left voicemail message to return call to [phone] for ED/ Covid outreach. This is the 2nd attempt to contact, no further attempts will be made.

## 2020-08-04 ENCOUNTER — HOSPITAL ENCOUNTER (EMERGENCY)
Age: 30
Discharge: HOME | End: 2020-08-04
Attending: EMERGENCY MEDICINE
Payer: MEDICARE

## 2020-08-04 ENCOUNTER — APPOINTMENT (OUTPATIENT)
Dept: GENERAL RADIOLOGY | Age: 30
End: 2020-08-04
Payer: MEDICARE

## 2020-08-04 VITALS
DIASTOLIC BLOOD PRESSURE: 97 MMHG | TEMPERATURE: 97.8 F | SYSTOLIC BLOOD PRESSURE: 151 MMHG | WEIGHT: 170 LBS | OXYGEN SATURATION: 97 % | RESPIRATION RATE: 25 BRPM | HEART RATE: 74 BPM | HEIGHT: 66 IN | BODY MASS INDEX: 27.32 KG/M2

## 2020-08-04 LAB
-: NORMAL
ABSOLUTE EOS #: 0.07 K/UL (ref 0–0.44)
ABSOLUTE IMMATURE GRANULOCYTE: <0.03 K/UL (ref 0–0.3)
ABSOLUTE LYMPH #: 1.4 K/UL (ref 1.1–3.7)
ABSOLUTE MONO #: 0.44 K/UL (ref 0.1–1.2)
ALBUMIN SERPL-MCNC: 3.2 G/DL (ref 3.5–5.2)
ALBUMIN/GLOBULIN RATIO: 1.3 (ref 1–2.5)
ALP BLD-CCNC: 106 U/L (ref 40–129)
ALT SERPL-CCNC: 24 U/L (ref 5–41)
AMORPHOUS: NORMAL
ANION GAP SERPL CALCULATED.3IONS-SCNC: 12 MMOL/L (ref 9–17)
AST SERPL-CCNC: 26 U/L
BACTERIA: NORMAL
BASOPHILS # BLD: 1 % (ref 0–2)
BASOPHILS ABSOLUTE: 0.04 K/UL (ref 0–0.2)
BILIRUB SERPL-MCNC: 0.19 MG/DL (ref 0.3–1.2)
BILIRUBIN DIRECT: <0.08 MG/DL
BILIRUBIN URINE: NEGATIVE
BILIRUBIN, INDIRECT: ABNORMAL MG/DL (ref 0–1)
BUN BLDV-MCNC: 17 MG/DL (ref 6–20)
BUN/CREAT BLD: ABNORMAL (ref 9–20)
CALCIUM SERPL-MCNC: 9.2 MG/DL (ref 8.6–10.4)
CASTS UA: NORMAL /LPF (ref 0–8)
CHLORIDE BLD-SCNC: 100 MMOL/L (ref 98–107)
CO2: 25 MMOL/L (ref 20–31)
COLOR: YELLOW
COMMENT UA: ABNORMAL
CREAT SERPL-MCNC: 1.19 MG/DL (ref 0.7–1.2)
CRYSTALS, UA: NORMAL /HPF
DIFFERENTIAL TYPE: ABNORMAL
EOSINOPHILS RELATIVE PERCENT: 1 % (ref 1–4)
EPITHELIAL CELLS UA: NORMAL /HPF (ref 0–5)
GFR AFRICAN AMERICAN: >60 ML/MIN
GFR NON-AFRICAN AMERICAN: >60 ML/MIN
GFR SERPL CREATININE-BSD FRML MDRD: ABNORMAL ML/MIN/{1.73_M2}
GFR SERPL CREATININE-BSD FRML MDRD: ABNORMAL ML/MIN/{1.73_M2}
GLOBULIN: ABNORMAL G/DL (ref 1.5–3.8)
GLUCOSE BLD-MCNC: 308 MG/DL (ref 75–110)
GLUCOSE BLD-MCNC: 323 MG/DL (ref 70–99)
GLUCOSE URINE: ABNORMAL
HCT VFR BLD CALC: 39.9 % (ref 40.7–50.3)
HEMOGLOBIN: 13 G/DL (ref 13–17)
IMMATURE GRANULOCYTES: 0 %
KETONES, URINE: NEGATIVE
LEUKOCYTE ESTERASE, URINE: NEGATIVE
LIPASE: 13 U/L (ref 13–60)
LYMPHOCYTES # BLD: 24 % (ref 24–43)
MCH RBC QN AUTO: 31.7 PG (ref 25.2–33.5)
MCHC RBC AUTO-ENTMCNC: 32.6 G/DL (ref 28.4–34.8)
MCV RBC AUTO: 97.3 FL (ref 82.6–102.9)
MONOCYTES # BLD: 8 % (ref 3–12)
MUCUS: NORMAL
NITRITE, URINE: NEGATIVE
NRBC AUTOMATED: 0 PER 100 WBC
OTHER OBSERVATIONS UA: NORMAL
PDW BLD-RTO: 13.2 % (ref 11.8–14.4)
PH UA: 7.5 (ref 5–8)
PLATELET # BLD: 288 K/UL (ref 138–453)
PLATELET ESTIMATE: ABNORMAL
PMV BLD AUTO: 9.1 FL (ref 8.1–13.5)
POTASSIUM SERPL-SCNC: 4.1 MMOL/L (ref 3.7–5.3)
PROTEIN UA: ABNORMAL
RBC # BLD: 4.1 M/UL (ref 4.21–5.77)
RBC # BLD: ABNORMAL 10*6/UL
RBC UA: NORMAL /HPF (ref 0–4)
RENAL EPITHELIAL, UA: NORMAL /HPF
SEG NEUTROPHILS: 66 % (ref 36–65)
SEGMENTED NEUTROPHILS ABSOLUTE COUNT: 3.91 K/UL (ref 1.5–8.1)
SODIUM BLD-SCNC: 137 MMOL/L (ref 135–144)
SPECIFIC GRAVITY UA: 1.03 (ref 1–1.03)
TOTAL PROTEIN: 5.7 G/DL (ref 6.4–8.3)
TRICHOMONAS: NORMAL
TROPONIN INTERP: ABNORMAL
TROPONIN INTERP: NORMAL
TROPONIN T: ABNORMAL NG/ML
TROPONIN T: NORMAL NG/ML
TROPONIN, HIGH SENSITIVITY: 21 NG/L (ref 0–22)
TROPONIN, HIGH SENSITIVITY: 23 NG/L (ref 0–22)
TURBIDITY: CLEAR
URINE HGB: ABNORMAL
UROBILINOGEN, URINE: NORMAL
WBC # BLD: 5.9 K/UL (ref 3.5–11.3)
WBC # BLD: ABNORMAL 10*3/UL
WBC UA: NORMAL /HPF (ref 0–5)
YEAST: NORMAL

## 2020-08-04 PROCEDURE — 99285 EMERGENCY DEPT VISIT HI MDM: CPT

## 2020-08-04 PROCEDURE — 93005 ELECTROCARDIOGRAM TRACING: CPT | Performed by: EMERGENCY MEDICINE

## 2020-08-04 PROCEDURE — 81001 URINALYSIS AUTO W/SCOPE: CPT

## 2020-08-04 PROCEDURE — 6370000000 HC RX 637 (ALT 250 FOR IP)

## 2020-08-04 PROCEDURE — 96374 THER/PROPH/DIAG INJ IV PUSH: CPT

## 2020-08-04 PROCEDURE — 80048 BASIC METABOLIC PNL TOTAL CA: CPT

## 2020-08-04 PROCEDURE — 80076 HEPATIC FUNCTION PANEL: CPT

## 2020-08-04 PROCEDURE — 6360000002 HC RX W HCPCS

## 2020-08-04 PROCEDURE — 2580000003 HC RX 258: Performed by: EMERGENCY MEDICINE

## 2020-08-04 PROCEDURE — 82947 ASSAY GLUCOSE BLOOD QUANT: CPT

## 2020-08-04 PROCEDURE — 71045 X-RAY EXAM CHEST 1 VIEW: CPT

## 2020-08-04 PROCEDURE — 6370000000 HC RX 637 (ALT 250 FOR IP): Performed by: EMERGENCY MEDICINE

## 2020-08-04 PROCEDURE — 84484 ASSAY OF TROPONIN QUANT: CPT

## 2020-08-04 PROCEDURE — 85025 COMPLETE CBC W/AUTO DIFF WBC: CPT

## 2020-08-04 PROCEDURE — 83690 ASSAY OF LIPASE: CPT

## 2020-08-04 RX ORDER — PANTOPRAZOLE SODIUM 40 MG/1
40 TABLET, DELAYED RELEASE ORAL ONCE
Status: COMPLETED | OUTPATIENT
Start: 2020-08-04 | End: 2020-08-04

## 2020-08-04 RX ORDER — 0.9 % SODIUM CHLORIDE 0.9 %
1000 INTRAVENOUS SOLUTION INTRAVENOUS ONCE
Status: COMPLETED | OUTPATIENT
Start: 2020-08-04 | End: 2020-08-04

## 2020-08-04 RX ORDER — ONDANSETRON 2 MG/ML
INJECTION INTRAMUSCULAR; INTRAVENOUS
Status: COMPLETED
Start: 2020-08-04 | End: 2020-08-04

## 2020-08-04 RX ORDER — MAGNESIUM HYDROXIDE/ALUMINUM HYDROXICE/SIMETHICONE 120; 1200; 1200 MG/30ML; MG/30ML; MG/30ML
30 SUSPENSION ORAL
Status: COMPLETED | OUTPATIENT
Start: 2020-08-04 | End: 2020-08-04

## 2020-08-04 RX ORDER — LIDOCAINE HYDROCHLORIDE 20 MG/ML
15 SOLUTION OROPHARYNGEAL ONCE
Status: COMPLETED | OUTPATIENT
Start: 2020-08-04 | End: 2020-08-04

## 2020-08-04 RX ORDER — MAGNESIUM HYDROXIDE/ALUMINUM HYDROXICE/SIMETHICONE 120; 1200; 1200 MG/30ML; MG/30ML; MG/30ML
SUSPENSION ORAL
Status: COMPLETED
Start: 2020-08-04 | End: 2020-08-04

## 2020-08-04 RX ORDER — ONDANSETRON 2 MG/ML
4 INJECTION INTRAMUSCULAR; INTRAVENOUS ONCE
Status: COMPLETED | OUTPATIENT
Start: 2020-08-04 | End: 2020-08-04

## 2020-08-04 RX ADMIN — ALUMINUM HYDROXIDE, MAGNESIUM HYDROXIDE, AND SIMETHICONE 30 ML: 200; 200; 20 SUSPENSION ORAL at 19:30

## 2020-08-04 RX ADMIN — PANTOPRAZOLE SODIUM 40 MG: 40 TABLET, DELAYED RELEASE ORAL at 16:53

## 2020-08-04 RX ADMIN — LIDOCAINE HYDROCHLORIDE 15 ML: 20 SOLUTION ORAL; TOPICAL at 16:53

## 2020-08-04 RX ADMIN — MAGNESIUM HYDROXIDE/ALUMINUM HYDROXICE/SIMETHICONE 30 ML: 120; 1200; 1200 SUSPENSION ORAL at 19:30

## 2020-08-04 RX ADMIN — ONDANSETRON 4 MG: 2 INJECTION INTRAMUSCULAR; INTRAVENOUS at 16:20

## 2020-08-04 RX ADMIN — SODIUM CHLORIDE 1000 ML: 9 INJECTION, SOLUTION INTRAVENOUS at 16:19

## 2020-08-04 ASSESSMENT — PAIN SCALES - GENERAL: PAINLEVEL_OUTOF10: 8

## 2020-08-04 ASSESSMENT — ENCOUNTER SYMPTOMS
VOMITING: 1
FACIAL SWELLING: 0
COLOR CHANGE: 0
ABDOMINAL DISTENTION: 0
SHORTNESS OF BREATH: 0
CONSTIPATION: 0
APNEA: 0
ABDOMINAL PAIN: 0
BACK PAIN: 0
DIARRHEA: 0
NAUSEA: 1
CHEST TIGHTNESS: 0

## 2020-08-04 ASSESSMENT — PAIN DESCRIPTION - LOCATION: LOCATION: CHEST

## 2020-08-04 ASSESSMENT — PAIN DESCRIPTION - PAIN TYPE: TYPE: ACUTE PAIN

## 2020-08-04 ASSESSMENT — PAIN DESCRIPTION - ORIENTATION: ORIENTATION: INNER

## 2020-08-04 NOTE — ED TRIAGE NOTES
Patient arrived to unit with chest pain and heart burn complaints. Patent reports he was seen her a few weeks ago and its not going away. Patient reports it awakens in from sleep with a burning sensation. Patient also reports N/V. Resident at bedside.

## 2020-08-04 NOTE — ED PROVIDER NOTES
[facility] ED  Emergency Department Encounter  EmergencyMedicine Resident     Pt Name:Vaibhav Vasquezo Area  MRN: [id number]  Margaretgflinda 1990  Date of evaluation: 8/4/20  PCP:  Paige Ludwig MD    [address] [place] [address]       Chief Complaint   Patient presents with    Chest Pain     SEVERAL DAYS, AWAKEN HIM FROM SLEEP       HISTORY OF PRESENT ILLNESS  (Location/Symptom, Timing/Onset, Context/Setting, Quality, Duration, Modifying Factors, Severity.)      Kenia Segura is a 27 y.o. male who presents with acute substernal chest pain radiates towards his throat with associated nausea and vomiting. This been going on for 2 days. Rates his pain currently 7 out of 10, describes it as sharp burning. States the pain does not radiate down either arms no numbness and tingling in his arms. Does not state that his chest pain is positional, laying flat or sitting up does not change it. Nothing seems to make it better. denies any shortness of breath, thus he has been sweaty. Has had about 4 episodes of emesis today, no blood in the emesis. He said that within the last month he has been having elevated glucose readings when he checks his blood sugar. When he has symptoms like he is presenting with today he states that his sugars are frequently elevated. Point-of-care glucose was obtained while in the room at 308. Patient was seen here a week ago for similar complaints, his symptoms resolved and then reappeared again 2 days ago. 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 No additional pertinent past medical history to add     has no past surgical history on file.   No additional pertinent past surgical history to add    Social History     Socioeconomic History    Marital status:      Spouse name: Not on file    Number of children: Not on file    Years of education: Not on file acetaminophen (TYLENOL) 325 MG tablet Take 2 tablets by mouth every 6 hours as needed for Pain 7/20/20   Norman Cruz DO   ibuprofen (ADVIL;MOTRIN) 600 MG tablet Take 1 tablet by mouth every 6 hours as needed for Pain 7/20/20   Norman Cruz DO   Insulin Pen Needle (COMFORT EZ PEN NEEDLES) 32G X 6 MM MISC USE AS DIRECTED WITH INSULIN PENS (UP TO 3 TIMES DAILY) 7/2/20   Gian Varma MD   insulin glargine (BASAGLAR KWIKPEN) 100 UNIT/ML injection pen Inject 10 Units into the skin nightly 6/19/20   Aj Escobar MD   insulin aspart (NOVOLOG) 100 UNIT/ML injection pen Inject 5 Units into the skin 3 times daily (before meals) 6/19/20   Aj Escobar MD   ondansetron (ZOFRAN) 4 MG tablet Take 1 tablet by mouth daily as needed for Nausea or Vomiting 6/11/20   Aj Escobar MD   Lancets MISC 1 each by Does not apply route daily Use___times daily    Diagnisis:250.0  Diabetes Mellitus____Insulin Dependent___Non-Insulin Dependent 5/18/20   Nichelle Pérez MD   Alcohol Swabs (ALCOHOL PREP) 70 % PADS 1 box by Does not apply route 3 times daily (with meals) Use_____times per day  Diagnosis: 250.0   Diabetes ___Insulin-Dependent___Non-Insulin Dependent 5/18/20   Nichelle Pérez MD   blood glucose monitor strips 1 strip by Other route 3 times daily (with meals) Test___times daily    Diagnosis: 250.0   Diabetes Mellitus____Insulin Dependent____Non-Insulin Dependent 5/18/20   Nichelle Pérez MD   glucose monitoring kit (FREESTYLE) monitoring kit 1 kit by Does not apply route daily 5/16/20   Meseret Albarran MD   lisinopril (PRINIVIL;ZESTRIL) 10 MG tablet Take 1 tablet by mouth daily 5/16/20   Meseret Albarran MD   ondansetron (ZOFRAN ODT) 4 MG disintegrating tablet Take 1 tablet by mouth every 8 hours as needed for Nausea or Vomiting 5/16/20   Meseret Albarran MD   albuterol sulfate HFA (PROAIR HFA) 108 (90 Base) MCG/ACT inhaler Inhale 2 puffs into the lungs every 4 hours as needed for Wheezing 9/6/19   Annie Nix PA-C Abdominal:      General: Abdomen is flat. There is no distension. Palpations: Abdomen is soft. Tenderness: There is no abdominal tenderness. There is no guarding. Musculoskeletal: Normal range of motion. General: No swelling or tenderness. Right lower leg: No edema. Left lower leg: No edema. Skin:     General: Skin is warm and dry. Coloration: Skin is not pale. Findings: No erythema or rash. Neurological:      General: No focal deficit present. Mental Status: He is alert and oriented to person, place, and time. Mental status is at baseline.    Psychiatric:         Mood and Affect: Mood normal.         Behavior: Behavior normal.         DIFFERENTIAL  DIAGNOSIS     PLAN (LABS / IMAGING / EKG):  Orders Placed This Encounter   Procedures    XR CHEST PORTABLE    CBC Auto Differential    Basic Metabolic Panel    Urinalysis    Troponin    Troponin    LIPASE    HEPATIC FUNCTION PANEL    Troponin    Microscopic Urinalysis    POC Glucose Fingerstick    EKG 12 Lead       MEDICATIONS ORDERED:  Orders Placed This Encounter   Medications    0.9 % sodium chloride bolus    ondansetron (ZOFRAN) injection 4 mg    ondansetron (ZOFRAN) 4 MG/2ML injection     COY CASTRO: cabinet override    aluminum & magnesium hydroxide-simethicone (MAALOX) 200-200-20 MG/5ML suspension 30 mL    pantoprazole (PROTONIX) tablet 40 mg    lidocaine viscous hcl (XYLOCAINE) 2 % solution 15 mL    aluminum & magnesium hydroxide-simethicone (MAALOX) 200-200-20 MG/5ML suspension     Aidan Virgen A: cabinet override       DDX: Acute coronary syndrome, pancreatitis, DKA    DIAGNOSTIC RESULTS / EMERGENCY DEPARTMENT COURSE / MDM   LAB RESULTS:  Results for orders placed or performed during the hospital encounter of 08/04/20   CBC Auto Differential   Result Value Ref Range    WBC 5.9 3.5 - 11.3 k/uL    RBC 4.10 (L) 4.21 - 5.77 m/uL    Hemoglobin 13.0 13.0 - 17.0 g/dL    Hematocrit 39.9 (L) 40.7 - 50.3 %    MCV 97.3 82.6 - 102.9 fL    MCH 31.7 25.2 - 33.5 pg    MCHC 32.6 28.4 - 34.8 g/dL    RDW 13.2 11.8 - 14.4 %    Platelets 056 795 - 574 k/uL    MPV 9.1 8.1 - 13.5 fL    NRBC Automated 0.0 0.0 per 100 WBC    Differential Type NOT REPORTED     Seg Neutrophils 66 (H) 36 - 65 %    Lymphocytes 24 24 - 43 %    Monocytes 8 3 - 12 %    Eosinophils % 1 1 - 4 %    Basophils 1 0 - 2 %    Immature Granulocytes 0 0 %    Segs Absolute 3.91 1.50 - 8.10 k/uL    Absolute Lymph # 1.40 1.10 - 3.70 k/uL    Absolute Mono # 0.44 0.10 - 1.20 k/uL    Absolute Eos # 0.07 0.00 - 0.44 k/uL    Basophils Absolute 0.04 0.00 - 0.20 k/uL    Absolute Immature Granulocyte <0.03 0.00 - 0.30 k/uL    WBC Morphology NOT REPORTED     RBC Morphology NOT REPORTED     Platelet Estimate NOT REPORTED    Basic Metabolic Panel   Result Value Ref Range    Glucose 323 (H) 70 - 99 mg/dL    BUN 17 6 - 20 mg/dL    CREATININE 1.19 0.70 - 1.20 mg/dL    Bun/Cre Ratio NOT REPORTED 9 - 20    Calcium 9.2 8.6 - 10.4 mg/dL    Sodium 137 135 - 144 mmol/L    Potassium 4.1 3.7 - 5.3 mmol/L    Chloride 100 98 - 107 mmol/L    CO2 25 20 - 31 mmol/L    Anion Gap 12 9 - 17 mmol/L    GFR Non-African American >60 >60 mL/min    GFR African American >60 >60 mL/min    GFR Comment          GFR Staging NOT REPORTED    Urinalysis   Result Value Ref Range    Color, UA YELLOW YELLOW    Turbidity UA CLEAR CLEAR    Glucose, Ur 3+ (A) NEGATIVE    Bilirubin Urine NEGATIVE NEGATIVE    Ketones, Urine NEGATIVE NEGATIVE    Specific Gravity, UA 1.027 1.005 - 1.030    Urine Hgb MODERATE (A) NEGATIVE    pH, UA 7.5 5.0 - 8.0    Protein, UA 3+ (A) NEGATIVE    Urobilinogen, Urine Normal Normal    Nitrite, Urine NEGATIVE NEGATIVE    Leukocyte Esterase, Urine NEGATIVE NEGATIVE    Urinalysis Comments NOT REPORTED    Troponin   Result Value Ref Range    Troponin, High Sensitivity 23 (H) 0 - 22 ng/L    Troponin T NOT REPORTED <0.03 ng/mL    Troponin Interp NOT REPORTED    LIPASE   Result Value Ref Range    Lipase 13 13 - 60 U/L   HEPATIC FUNCTION PANEL   Result Value Ref Range    Alb 3.2 (L) 3.5 - 5.2 g/dL    Alkaline Phosphatase 106 40 - 129 U/L    ALT 24 5 - 41 U/L    AST 26 <40 U/L    Total Bilirubin 0.19 (L) 0.3 - 1.2 mg/dL    Bilirubin, Direct <0.08 <0.31 mg/dL    Bilirubin, Indirect CANNOT BE CALCULATED 0.00 - 1.00 mg/dL    Total Protein 5.7 (L) 6.4 - 8.3 g/dL    Globulin NOT REPORTED 1.5 - 3.8 g/dL    Albumin/Globulin Ratio 1.3 1.0 - 2.5   Troponin   Result Value Ref Range    Troponin, High Sensitivity 21 0 - 22 ng/L    Troponin T NOT REPORTED <0.03 ng/mL    Troponin Interp NOT REPORTED    Microscopic Urinalysis   Result Value Ref Range    -          WBC, UA 0 TO 2 0 - 5 /HPF    RBC, UA 10 TO 20 0 - 4 /HPF    Casts UA NOT REPORTED 0 - 8 /LPF    Crystals, UA NOT REPORTED None /HPF    Epithelial Cells UA 0 TO 2 0 - 5 /HPF    Renal Epithelial, UA NOT REPORTED 0 /HPF    Bacteria, UA NOT REPORTED None    Mucus, UA NOT REPORTED None    Trichomonas, UA NOT REPORTED None    Amorphous, UA NOT REPORTED None    Other Observations UA NOT REPORTED NOT REQ. Yeast, UA NOT REPORTED None   POC Glucose Fingerstick   Result Value Ref Range    POC Glucose 308 (H) 75 - 110 mg/dL       RADIOLOGY:  XR CHEST PORTABLE   Final Result   No acute cardiopulmonary disease. EKG  EKG Interpretation    Interpreted by emergency department physician    Rhythm: normal sinus   Rate: normal  Axis: normal  Ectopy: none  Conduction: normal  ST Segments: no acute change  T Waves: normal  Q Waves: none    Clinical Impression: non-specific EKG    Viky Kimman, [place]     All EKG's are interpreted by the Emergency Department Physician who either signs or Co-signs this chart in the absence of a cardiologist.    EMERGENCY DEPARTMENT COURSE:  ED Course as of Aug 04 2137   Pako Marmolejo Aug 04, 2020   1600 Patient was seen and examined by myself and Dr. Modesto Contreras. Nursing staff witnessed patient vomiting, Zofran was given.   Maalox thisnote were completed with a voice recognition program.  Efforts were made to edit the dictations but occasionally words are mis-transcribed.)        Darrel Suggs,   Resident  08/04/20 1252

## 2020-08-05 NOTE — ED PROVIDER NOTES
[facility]     Emergency Department     Faculty Attestation    I performed a history and physical examination of the patient and discussed management with the resident. I reviewed the residents note and agree with the documented findings and plan of care. Any areas of disagreement are noted on the chart. I was personally present for the key portions of any procedures. I have documented in the chart those procedures where I was not present during the key portions. I have reviewed the emergency nurses triage note. I agree with the chief complaint, past medical history, past surgical history, allergies, medications, social and family history as documented unless otherwise noted below. For Physician Assistant/ Nurse Practitioner cases/documentation I have personally evaluated this patient and have completed at least one if not all key elements of the E/M (history, physical exam, and MDM). Additional findings are as noted. I have personally seen and evaluated the patient. I find the patient's history and physical exam are consistent with the NP/PA documentation. I agree with the care provided, treatment rendered, disposition and follow-up plan. Atypical chest pain, radiating to his throat. Associated with 2 days of nausea and vomiting. No diarrhea. No fever or chills. No sick contacts. Exam:  General: Laying on the bed, awake, alert and in no acute distress  CV: normal rate and regular rhythm  Lungs: Breathing comfortably on room air with no tachypnea, hypoxia, or increased work of breathing  Abdomen: soft, non-tender, non-distended    Plan:  Troponin 23, similar to prior. 1 hour repeat shows troponin of 21. Nausea and vomiting treated with Maalox and Zofran. Heart score 4, offered admission for cardiac work-up, however patient declined, would prefer to follow-up with his PCP. Encouraged prompt follow-up, return with any worsening chest pain.         Alexandre Bills MD Attending Emergency  Physician              Cherelle Smyth MD  08/05/20 0005

## 2020-08-06 LAB
EKG ATRIAL RATE: 73 BPM
EKG P AXIS: 70 DEGREES
EKG P-R INTERVAL: 128 MS
EKG Q-T INTERVAL: 358 MS
EKG QRS DURATION: 84 MS
EKG QTC CALCULATION (BAZETT): 394 MS
EKG R AXIS: 8 DEGREES
EKG T AXIS: 38 DEGREES
EKG VENTRICULAR RATE: 73 BPM

## 2020-08-06 PROCEDURE — 93010 ELECTROCARDIOGRAM REPORT: CPT | Performed by: INTERNAL MEDICINE

## 2020-08-07 RX ORDER — LISINOPRIL 10 MG/1
10 TABLET ORAL DAILY
Qty: 30 TABLET | Refills: 2 | Status: SHIPPED | OUTPATIENT
Start: 2020-08-07 | End: 2020-09-11

## 2020-08-19 NOTE — TELEPHONE ENCOUNTER
Last visit:   Last Med refill:   Does patient have enough medication for 72 hours: No:     Next Visit Date:  No future appointments. Health Maintenance   Topic Date Due    Diabetic foot exam  05/01/2000    Diabetic retinal exam  05/01/2000    HIV screen  05/01/2005    Lipid screen  05/29/2014    Diabetic microalbuminuria test  01/24/2019    Annual Wellness Visit (AWV)  06/20/2019    Pneumococcal 0-64 years Vaccine (1 of 1 - PPSV23) 12/11/2020 (Originally 5/1/1996)    Hepatitis B vaccine (1 of 3 - Risk 3-dose series) 06/11/2021 (Originally 5/1/2009)    DTaP/Tdap/Td vaccine (1 - Tdap) 06/11/2021 (Originally 5/1/2009)    Flu vaccine (1) 09/01/2020    A1C test (Diabetic or Prediabetic)  09/11/2020    Potassium monitoring  08/04/2021    Creatinine monitoring  08/04/2021    Hepatitis A vaccine  Aged Out    Hib vaccine  Aged Out    Meningococcal (ACWY) vaccine  Aged Out    Varicella vaccine  Discontinued       Hemoglobin A1C (%)   Date Value   06/11/2020 12.6 (A)   10/30/2019 9.7   02/18/2019 9.5 (A)             ( goal A1C is < 7)   Microalb/Crt.  Ratio (mcg/mg creat)   Date Value   01/24/2018 779 (H)     LDL Cholesterol (mg/dL)   Date Value   05/29/2013 109 (H)       (goal LDL is <100)   AST (U/L)   Date Value   08/04/2020 26     ALT (U/L)   Date Value   08/04/2020 24     BUN (mg/dL)   Date Value   08/04/2020 17     BP Readings from Last 3 Encounters:   08/04/20 (!) 151/97   07/29/20 (!) 175/93   07/28/20 (!) 150/80          (goal 120/80)    All Future Testing planned in [facility]  Lab Frequency Next Occurrence   HIV Screen Once 10/30/2020   US DUP LOWER EXTREMITY LEFT REY Once 10/30/2020   Hemoglobin A1C Once 04/07/2021   Microalbumin, Ur Once 07/28/2021   Lipid Panel Once 14/46/6396   Basic Metabolic Panel Once 29/74/8951               Patient Active Problem List:     Type 1 diabetes mellitus without complication ([facility]     Diabetic ketoacidosis without coma ([address]     Smoking     Hypomagnesemia     HLD (hyperlipidemia)     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 If I can be of assistance, please route to the applicable pool. Thank you.

## 2020-08-20 RX ORDER — CYCLOBENZAPRINE HCL 5 MG
5 TABLET ORAL 2 TIMES DAILY PRN
Qty: 10 TABLET | Refills: 0 | Status: SHIPPED | OUTPATIENT
Start: 2020-08-20 | End: 2020-08-30

## 2020-08-28 RX ORDER — ONDANSETRON 4 MG/1
4 TABLET, FILM COATED ORAL DAILY PRN
Qty: 30 TABLET | Refills: 0 | Status: SHIPPED | OUTPATIENT
Start: 2020-08-28 | End: 2021-02-02

## 2020-09-09 RX ORDER — CYCLOBENZAPRINE HCL 5 MG
5 TABLET ORAL 2 TIMES DAILY PRN
Qty: 10 TABLET | Refills: 0 | Status: SHIPPED | OUTPATIENT
Start: 2020-09-09 | End: 2020-10-08

## 2020-09-09 RX ORDER — INSULIN GLARGINE 100 [IU]/ML
10 INJECTION, SOLUTION SUBCUTANEOUS NIGHTLY
Qty: 5 PEN | Refills: 0 | Status: SHIPPED | OUTPATIENT
Start: 2020-09-09 | End: 2020-09-11 | Stop reason: SDUPTHER

## 2020-09-09 NOTE — TELEPHONE ENCOUNTER
Please address the medication refill and close the encounter. If I can be of assistance, please route to the applicable pool. Thank you. Last visit: 07/28/2020  Last Med refill: 06/19/2020  Does patient have enough medication for 72 hours: Yes    Next Visit Date:  No future appointments. Health Maintenance   Topic Date Due    Diabetic foot exam  05/01/2000    Diabetic retinal exam  05/01/2000    HIV screen  05/01/2005    Lipid screen  05/29/2014    Diabetic microalbuminuria test  01/24/2019    Annual Wellness Visit (AWV)  06/20/2019    Flu vaccine (1) 09/01/2020    A1C test (Diabetic or Prediabetic)  09/11/2020    Pneumococcal 0-64 years Vaccine (1 of 1 - PPSV23) 12/11/2020 (Originally 5/1/1996)    Hepatitis B vaccine (1 of 3 - Risk 3-dose series) 06/11/2021 (Originally 5/1/2009)    DTaP/Tdap/Td vaccine (1 - Tdap) 06/11/2021 (Originally 5/1/2009)    Potassium monitoring  08/04/2021    Creatinine monitoring  08/04/2021    Hepatitis A vaccine  Aged Out    Hib vaccine  Aged Out    Meningococcal (ACWY) vaccine  Aged Out    Varicella vaccine  Discontinued       Hemoglobin A1C (%)   Date Value   06/11/2020 12.6 (A)   10/30/2019 9.7   02/18/2019 9.5 (A)             ( goal A1C is < 7)   Microalb/Crt.  Ratio (mcg/mg creat)   Date Value   01/24/2018 779 (H)     LDL Cholesterol (mg/dL)   Date Value   05/29/2013 109 (H)       (goal LDL is <100)   AST (U/L)   Date Value   08/04/2020 26     ALT (U/L)   Date Value   08/04/2020 24     BUN (mg/dL)   Date Value   08/04/2020 17     BP Readings from Last 3 Encounters:   08/04/20 (!) 151/97   07/29/20 (!) 175/93   07/28/20 (!) 150/80          (goal 120/80)    All Future Testing planned in [facility]  Lab Frequency Next Occurrence   HIV Screen Once 10/30/2020   US DUP LOWER EXTREMITY LEFT REY Once 10/30/2020   Hemoglobin A1C Once 04/07/2021   Microalbumin, Ur Once 07/28/2021   Lipid Panel Once 84/42/3205   Basic Metabolic Panel Once 16/09/6814 Patient Active Problem List:     Type 1 diabetes mellitus without complication ([facility]     Diabetic ketoacidosis without coma (HCC)     Smoking     Hypomagnesemia     HLD (hyperlipidemia)     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

## 2020-09-11 ENCOUNTER — OFFICE VISIT (OUTPATIENT)
Dept: FAMILY MEDICINE CLINIC | Age: 30
End: 2020-09-11
Payer: MEDICARE

## 2020-09-11 VITALS
HEART RATE: 89 BPM | DIASTOLIC BLOOD PRESSURE: 87 MMHG | SYSTOLIC BLOOD PRESSURE: 150 MMHG | WEIGHT: 156 LBS | BODY MASS INDEX: 25.18 KG/M2 | OXYGEN SATURATION: 99 % | TEMPERATURE: 98.2 F

## 2020-09-11 PROBLEM — K21.9 GASTROESOPHAGEAL REFLUX DISEASE WITHOUT ESOPHAGITIS: Status: ACTIVE | Noted: 2020-09-11

## 2020-09-11 PROBLEM — I10 ESSENTIAL HYPERTENSION: Status: ACTIVE | Noted: 2020-09-11

## 2020-09-11 LAB — HBA1C MFR BLD: 11.7 %

## 2020-09-11 PROCEDURE — G8419 CALC BMI OUT NRM PARAM NOF/U: HCPCS | Performed by: STUDENT IN AN ORGANIZED HEALTH CARE EDUCATION/TRAINING PROGRAM

## 2020-09-11 PROCEDURE — 2022F DILAT RTA XM EVC RTNOPTHY: CPT | Performed by: STUDENT IN AN ORGANIZED HEALTH CARE EDUCATION/TRAINING PROGRAM

## 2020-09-11 PROCEDURE — G8427 DOCREV CUR MEDS BY ELIG CLIN: HCPCS | Performed by: STUDENT IN AN ORGANIZED HEALTH CARE EDUCATION/TRAINING PROGRAM

## 2020-09-11 PROCEDURE — 4004F PT TOBACCO SCREEN RCVD TLK: CPT | Performed by: STUDENT IN AN ORGANIZED HEALTH CARE EDUCATION/TRAINING PROGRAM

## 2020-09-11 PROCEDURE — 83036 HEMOGLOBIN GLYCOSYLATED A1C: CPT | Performed by: STUDENT IN AN ORGANIZED HEALTH CARE EDUCATION/TRAINING PROGRAM

## 2020-09-11 PROCEDURE — 99211 OFF/OP EST MAY X REQ PHY/QHP: CPT | Performed by: FAMILY MEDICINE

## 2020-09-11 PROCEDURE — 99213 OFFICE O/P EST LOW 20 MIN: CPT | Performed by: STUDENT IN AN ORGANIZED HEALTH CARE EDUCATION/TRAINING PROGRAM

## 2020-09-11 PROCEDURE — 3046F HEMOGLOBIN A1C LEVEL >9.0%: CPT | Performed by: STUDENT IN AN ORGANIZED HEALTH CARE EDUCATION/TRAINING PROGRAM

## 2020-09-11 RX ORDER — BLOOD PRESSURE TEST KIT
1 KIT MISCELLANEOUS PRN
Qty: 1 KIT | Refills: 0 | Status: SHIPPED | OUTPATIENT
Start: 2020-09-11

## 2020-09-11 RX ORDER — PANTOPRAZOLE SODIUM 40 MG/1
40 TABLET, DELAYED RELEASE ORAL DAILY
Qty: 30 TABLET | Refills: 1 | Status: SHIPPED | OUTPATIENT
Start: 2020-09-11 | End: 2021-01-13

## 2020-09-11 RX ORDER — INSULIN GLARGINE 100 [IU]/ML
15 INJECTION, SOLUTION SUBCUTANEOUS NIGHTLY
Qty: 5 PEN | Refills: 0 | Status: SHIPPED | OUTPATIENT
Start: 2020-09-11 | End: 2021-01-13

## 2020-09-11 RX ORDER — LISINOPRIL 20 MG/1
20 TABLET ORAL DAILY
Qty: 30 TABLET | Refills: 1 | Status: SHIPPED | OUTPATIENT
Start: 2020-09-11 | End: 2020-12-03

## 2020-09-11 ASSESSMENT — ENCOUNTER SYMPTOMS
CHEST TIGHTNESS: 0
SHORTNESS OF BREATH: 0
BACK PAIN: 0

## 2020-09-11 NOTE — PROGRESS NOTES
Visit Information    Have you changed or started any medications since your last visit including any over-the-counter medicines, vitamins, or herbal medicines? no   Have you stopped taking any of your medications? Is so, why? -  no  Are you having any side effects from any of your medications? - no    Have you seen any other physician or provider since your last visit?  no   Have you had any other diagnostic tests since your last visit? yes - lab work, xray   Have you been seen in the emergency room and/or had an admission in a hospital since we last saw you?  yes - chest pain   Have you had your routine dental cleaning in the past 6 months?  no     Do you have an active MyChart account? If no, what is the barrier?   Yes    Patient Care Team:  Tima Turner MD as PCP - General (Family Medicine)    Medical History Review  Past Medical, Family, and Social History reviewed and does not contribute to the patient presenting condition    Health Maintenance   Topic Date Due    Diabetic foot exam  05/01/2000    Diabetic retinal exam  05/01/2000    HIV screen  05/01/2005    Lipid screen  05/29/2014    Diabetic microalbuminuria test  01/24/2019    Annual Wellness Visit (AWV)  06/20/2019    Flu vaccine (1) 09/01/2020    A1C test (Diabetic or Prediabetic)  09/11/2020    Pneumococcal 0-64 years Vaccine (1 of 1 - PPSV23) 12/11/2020 (Originally 5/1/1996)    Hepatitis B vaccine (1 of 3 - Risk 3-dose series) 06/11/2021 (Originally 5/1/2009)    DTaP/Tdap/Td vaccine (1 - Tdap) 06/11/2021 (Originally 5/1/2009)    Potassium monitoring  08/04/2021    Creatinine monitoring  08/04/2021    Hepatitis A vaccine  Aged Out    Hib vaccine  Aged Out    Meningococcal (ACWY) vaccine  Aged Out    Varicella vaccine  Discontinued

## 2020-09-11 NOTE — PROGRESS NOTES
Attending Physician Statement  I have discussed the care of Liz Smith, including pertinent history and exam findings,  with the resident. I have reviewed the key elements of all parts of the encounter with the resident. I agree with the assessment, plan and orders as documented by the resident.   (GE Modifier)    Thai Faith

## 2020-10-08 RX ORDER — CYCLOBENZAPRINE HCL 5 MG
5 TABLET ORAL 2 TIMES DAILY PRN
Qty: 10 TABLET | Refills: 0 | Status: SHIPPED | OUTPATIENT
Start: 2020-10-08 | End: 2020-10-18

## 2020-10-13 ENCOUNTER — TELEPHONE (OUTPATIENT)
Dept: FAMILY MEDICINE CLINIC | Age: 30
End: 2020-10-13

## 2020-10-13 NOTE — TELEPHONE ENCOUNTER
Called to inform patient of missed appointment, if patient calls back please reschedule. No answer, voicemail was left.

## 2020-11-10 ENCOUNTER — HOSPITAL ENCOUNTER (EMERGENCY)
Age: 30
Discharge: HOME | End: 2020-11-10
Attending: EMERGENCY MEDICINE
Payer: MEDICARE

## 2020-11-10 VITALS
BODY MASS INDEX: 28.12 KG/M2 | HEART RATE: 82 BPM | SYSTOLIC BLOOD PRESSURE: 168 MMHG | RESPIRATION RATE: 18 BRPM | TEMPERATURE: 97.2 F | WEIGHT: 175 LBS | OXYGEN SATURATION: 98 % | DIASTOLIC BLOOD PRESSURE: 81 MMHG | HEIGHT: 66 IN

## 2020-11-10 PROCEDURE — 6370000000 HC RX 637 (ALT 250 FOR IP): Performed by: STUDENT IN AN ORGANIZED HEALTH CARE EDUCATION/TRAINING PROGRAM

## 2020-11-10 PROCEDURE — 99283 EMERGENCY DEPT VISIT LOW MDM: CPT

## 2020-11-10 RX ORDER — IBUPROFEN 600 MG/1
600 TABLET ORAL EVERY 6 HOURS PRN
Qty: 30 TABLET | Refills: 0 | Status: SHIPPED | OUTPATIENT
Start: 2020-11-10 | End: 2022-06-02

## 2020-11-10 RX ORDER — LIDOCAINE 4 G/G
1 PATCH TOPICAL DAILY
Qty: 30 PATCH | Refills: 0 | Status: SHIPPED | OUTPATIENT
Start: 2020-11-10 | End: 2020-12-10

## 2020-11-10 RX ORDER — CYCLOBENZAPRINE HCL 10 MG
10 TABLET ORAL NIGHTLY PRN
Qty: 10 TABLET | Refills: 0 | Status: SHIPPED | OUTPATIENT
Start: 2020-11-10 | End: 2020-11-20

## 2020-11-10 RX ORDER — LIDOCAINE 4 G/G
1 PATCH TOPICAL ONCE
Status: DISCONTINUED | OUTPATIENT
Start: 2020-11-10 | End: 2020-11-10 | Stop reason: HOSPADM

## 2020-11-10 RX ORDER — ACETAMINOPHEN 500 MG
1000 TABLET ORAL ONCE
Status: COMPLETED | OUTPATIENT
Start: 2020-11-10 | End: 2020-11-10

## 2020-11-10 RX ORDER — ACETAMINOPHEN 325 MG/1
650 TABLET ORAL EVERY 6 HOURS PRN
Qty: 120 TABLET | Refills: 0 | Status: SHIPPED | OUTPATIENT
Start: 2020-11-10 | End: 2022-06-02

## 2020-11-10 RX ADMIN — ACETAMINOPHEN 1000 MG: 500 TABLET ORAL at 12:19

## 2020-11-10 ASSESSMENT — ENCOUNTER SYMPTOMS
WHEEZING: 0
BACK PAIN: 1
NAUSEA: 0
CHEST TIGHTNESS: 0
SHORTNESS OF BREATH: 0
VOMITING: 0
ABDOMINAL PAIN: 0
COUGH: 0

## 2020-11-10 ASSESSMENT — PAIN SCALES - GENERAL: PAINLEVEL_OUTOF10: 9

## 2020-11-10 NOTE — ED NOTES
Pt presented to ED with complaints of left lower back pain and spasms. Pt denies injury. Pt alert and oriented x 4. RR even and non labored. Pt ambulated with steady gait.      Coleen Culp RN  11/10/20 [id number]

## 2020-11-10 NOTE — ED PROVIDER NOTES
[facility]     Emergency Department     Faculty Attestation    I performed a history and physical examination of the patient and discussed management with the resident. I reviewed the residents note and agree with the documented findings and plan of care. Any areas of disagreement are noted on the chart. I was personally present for the key portions of any procedures. I have documented in the chart those procedures where I was not present during the key portions. I have reviewed the emergency nurses triage note. I agree with the chief complaint, past medical history, past surgical history, allergies, medications, social, and family history as documented unless otherwise noted below.          30yo male with bilateral low back pain/spasms  Works as a  with repetitive movements  No fall or direct impact injury  No radiculopathy  No sensory or motor deficits to legs  No abdominal pain  No saddle anesthesia  No recent illness or fever  No other acute medical complaints  Bilateral QL and lumbar paraspinal musculature tenderness  No rash or overlying skin change  No specific midline pain  Normal sensation and muscle strength in legs  No CVA tenderness    No suspicion for spinal cord compression  Imaging not indicated    Stable for outpatient follow up          Talia Valle DO  Attending Emergency Physician       Juli Jessica DO  11/10/20 0681

## 2020-11-10 NOTE — ED PROVIDER NOTES
[facility]  ED  Emergency Department Encounter  Emergency Medicine Resident     Pt Name: Guy Louie  MRN: [id number]  [place] 1990  Date of evaluation: 11/10/20  PCP:  Chary Jeffries MD    [address] [place] [address]       Chief Complaint   Patient presents with    Spasms     in back       HISTORY OFPRESENT ILLNESS  (Location/Symptom, Timing/Onset, Context/Setting, Quality, Duration, Modifying Factors,Severity.)      Guy Louie is a 28 yo male who presents with low back pain and spasms. Patient states that he is a  and mops and sweeps repetitively and frequently gets low back pain and spasms. He states that it was bad enough today that he needed to come in and get evaluated and also requesting a work note. Patient denies any fall or trauma, radiation of pain into the legs, numbness or tingling in the legs or genitalia, denies urinary retention or urinary or bowel incontinence, denies fevers or chills or IV drug use. No history of back surgery. 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 has no past surgical history on file.  Denies    Social History     Socioeconomic History    Marital status:      Spouse name: Not on file    Number of children: Not on file    Years of education: Not on file    Highest education level: Not on file   Occupational History    Not on file   Social Needs    Financial resource strain: Not on file    Food insecurity     Worry: Not on file     Inability: Not on file    Transportation needs     Medical: Not on file     Non-medical: Not on file   Tobacco Use    Smoking status: Current Every Day Smoker     Packs/day: 0.50     Years: 10.00     Pack years: 5.00     Types: Cigarettes     Last attempt to quit: 2013     Years since quittin.2    Smokeless tobacco: Never Used   Substance and Sexual Activity    Alcohol vomiting. Genitourinary:        Denies urinary or bowel incontinence. Musculoskeletal: Positive for back pain. Negative for neck pain. Skin: Negative for rash and wound. Neurological: Negative for dizziness, syncope, weakness, light-headedness, numbness and headaches. PHYSICAL EXAM   (up to 7 for level 4, 8 or more forlevel 5)      INITIAL VITALS:   Vitals:    11/10/20 1202   BP: (!) 168/81   Pulse: 82   Resp: 18   Temp: 97.2 °F (36.2 °C)   SpO2: 98%   Weight: 175 lb (79.4 kg)   Height: 5' 6\" (1.676 m)           Physical Exam  Vitals signs and nursing note reviewed. Constitutional:       General: He is not in acute distress. Appearance: Normal appearance. He is not ill-appearing, toxic-appearing or diaphoretic. Neck:      Musculoskeletal: Normal range of motion and neck supple. No neck rigidity or muscular tenderness. Comments: No midline tenderness to the cervical, thoracic, or lumbar spine. Cardiovascular:      Rate and Rhythm: Normal rate and regular rhythm. Pulmonary:      Effort: Pulmonary effort is normal.      Breath sounds: Normal breath sounds. Abdominal:      General: There is no distension. Palpations: Abdomen is soft. Tenderness: There is no abdominal tenderness. There is no guarding. Musculoskeletal:      Comments: Hypertonicity and muscle spasm to the bilateral paraspinal musculature of the lower back. No midline spinal tenderness. Skin:     General: Skin is warm and dry. Findings: No rash. Neurological:      General: No focal deficit present. Mental Status: He is alert and oriented to person, place, and time. Cranial Nerves: No cranial nerve deficit. Sensory: No sensory deficit. Motor: No weakness. Coordination: Coordination normal.      Comments: Patellar and Achilles reflexes are 2/4 and symmetric.          DIFFERENTIAL  DIAGNOSIS     PLAN (LABS / IMAGING / EKG):  No orders of the defined types were placed in this encounter. MEDICATIONS ORDERED:  Orders Placed This Encounter   Medications    acetaminophen (TYLENOL) tablet 1,000 mg    lidocaine 4 % external patch 1 patch    acetaminophen (TYLENOL) 325 MG tablet     Sig: Take 2 tablets by mouth every 6 hours as needed for Pain     Dispense:  120 tablet     Refill:  0    ibuprofen (ADVIL;MOTRIN) 600 MG tablet     Sig: Take 1 tablet by mouth every 6 hours as needed for Pain     Dispense:  30 tablet     Refill:  0    cyclobenzaprine (FLEXERIL) 10 MG tablet     Sig: Take 1 tablet by mouth nightly as needed for Muscle spasms     Dispense:  10 tablet     Refill:  0    lidocaine 4 % external patch     Sig: Place 1 patch onto the skin daily     Dispense:  30 patch     Refill:  0         Initial MDM/Plan/ED course: 27 y.o. male who presents with muscle spasms of the lower back. On exam vitals normal patient is in no acute distress. Patient does not have any red flag signs or symptoms for low back pain and is young and therefore AAA is less likely. Physical exam unremarkable except for hypertonicity and spasticity of the bilateral lumbar paraspinal musculature without midline tenderness. No neurological deficits, normal strength of bilateral lower extremities as well as normal reflexes. Symptoms likely secondary to muscle spasm. Patient treated with anti-inflammatories and Lidoderm patch and given muscle relaxant prescription for home. DIAGNOSTIC RESULTS / EMERGENCY DEPARTMENT COURSE / MDM     LABS:  Labs Reviewed - No data to display      RADIOLOGY:  No results found. EKG      All EKG's are interpreted by the [facility] Physician who either signs or Co-signs this chart in the absence of a cardiologist.      PROCEDURES:  None    CONSULTS:  None    CRITICAL CARE:  Please see attending note    FINAL IMPRESSION      1.  Back spasm          DISPOSITION / PLAN     DISPOSITION Decision To Discharge 11/10/2020 12:32:26 PM      PATIENT REFERRED TO:  Heather Matos MD [address]  [phone]    Schedule an appointment as soon as possible for a visit   Follow up      DISCHARGE MEDICATIONS:  Discharge Medication List as of 11/10/2020 12:48 PM      START taking these medications    Details   cyclobenzaprine (FLEXERIL) 10 MG tablet Take 1 tablet by mouth nightly as needed for Muscle spasms, Disp-10 tablet,R-0Print      lidocaine 4 % external patch Place 1 patch onto the skin daily, Transdermal, DAILY Starting Tue 11/10/2020, Until Thu 12/10/2020, For 30 days, Disp-30 patch,R-0, Print             Tulio Hanley DO  Emergency Medicine Resident    (Please note that portions of this note were completed with a voice recognition program.Efforts were made to edit the dictations but occasionally words are mis-transcribed.)        Audie Sommers DO  Resident  11/10/20 6331

## 2020-11-20 ENCOUNTER — TELEPHONE (OUTPATIENT)
Dept: FAMILY MEDICINE CLINIC | Age: 30
End: 2020-11-20

## 2020-12-02 NOTE — TELEPHONE ENCOUNTER
Last visit: 9/11/20  Last Med refill: 10/6/20  Does patient have enough medication for 72 hours: Yes    Next Visit Date:  No future appointments. Health Maintenance   Topic Date Due    Diabetic foot exam  05/01/2000    Diabetic retinal exam  05/01/2000    HIV screen  05/01/2005    Lipid screen  05/29/2014    Diabetic microalbuminuria test  01/24/2019    Annual Wellness Visit (AWV)  06/20/2019    Flu vaccine (1) 09/01/2020    A1C test (Diabetic or Prediabetic)  12/11/2020    Pneumococcal 0-64 years Vaccine (1 of 1 - PPSV23) 12/11/2020 (Originally 5/1/1996)    Hepatitis B vaccine (1 of 3 - Risk 3-dose series) 06/11/2021 (Originally 5/1/2009)    DTaP/Tdap/Td vaccine (1 - Tdap) 06/11/2021 (Originally 5/1/2009)    Potassium monitoring  08/04/2021    Creatinine monitoring  08/04/2021    Hepatitis A vaccine  Aged Out    Hib vaccine  Aged Out    Meningococcal (ACWY) vaccine  Aged Out    Varicella vaccine  Discontinued       Hemoglobin A1C (%)   Date Value   09/11/2020 11.7   06/11/2020 12.6 (A)   10/30/2019 9.7             ( goal A1C is < 7)   Microalb/Crt.  Ratio (mcg/mg creat)   Date Value   01/24/2018 779 (H)     LDL Cholesterol (mg/dL)   Date Value   05/29/2013 109 (H)       (goal LDL is <100)   AST (U/L)   Date Value   08/04/2020 26     ALT (U/L)   Date Value   08/04/2020 24     BUN (mg/dL)   Date Value   08/04/2020 17     BP Readings from Last 3 Encounters:   11/10/20 (!) 168/81   09/11/20 (!) 150/87   08/04/20 (!) 151/97          (goal 120/80)    All Future Testing planned in [facility]  Lab Frequency Next Occurrence   HIV Screen Once 05/09/2021   Hemoglobin A1C Once 04/07/2021   Microalbumin, Ur Once 07/28/2021   Lipid Panel Once 80/81/2537   Basic Metabolic Panel Once 67/22/2415               Patient Active Problem List:     Type 1 diabetes mellitus without complication ([address]     Diabetic ketoacidosis without coma ([address]     Smoking     Hypomagnesemia     HLD (hyperlipidemia)     Ketosis due to diabetes ([facility] [address]     Nausea     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

## 2020-12-03 RX ORDER — LISINOPRIL 20 MG/1
20 TABLET ORAL DAILY
Qty: 30 TABLET | Refills: 1 | Status: SHIPPED | OUTPATIENT
Start: 2020-12-03 | End: 2020-12-28 | Stop reason: SDUPTHER

## 2020-12-28 ENCOUNTER — TELEPHONE (OUTPATIENT)
Dept: FAMILY MEDICINE CLINIC | Age: 30
End: 2020-12-28

## 2020-12-28 DIAGNOSIS — I10 ESSENTIAL HYPERTENSION: ICD-10-CM

## 2020-12-28 DIAGNOSIS — E10.9 TYPE 1 DIABETES MELLITUS WITHOUT COMPLICATION (HCC): ICD-10-CM

## 2020-12-28 RX ORDER — LISINOPRIL 20 MG/1
20 TABLET ORAL DAILY
Qty: 90 TABLET | Refills: 1 | Status: SHIPPED | OUTPATIENT
Start: 2020-12-28 | End: 2021-03-01 | Stop reason: SDUPTHER

## 2020-12-28 NOTE — TELEPHONE ENCOUNTER
Last visit:   Last Med refill:   Does patient have enough medication for 72 hours: yes pt pharmacy is asking for a 90 day supplies of medication    Next Visit Date:  No future appointments. Health Maintenance   Topic Date Due    Hepatitis C screen  1990    Pneumococcal 0-64 years Vaccine (1 of 1 - PPSV23) 05/01/1996    Diabetic foot exam  05/01/2000    Diabetic retinal exam  05/01/2000    HIV screen  05/01/2005    Lipid screen  05/29/2014    Diabetic microalbuminuria test  01/24/2019    Annual Wellness Visit (AWV)  06/20/2019    Flu vaccine (1) 09/01/2020    A1C test (Diabetic or Prediabetic)  12/11/2020    Hepatitis B vaccine (1 of 3 - Risk 3-dose series) 06/11/2021 (Originally 5/1/2009)    DTaP/Tdap/Td vaccine (1 - Tdap) 06/11/2021 (Originally 5/1/2009)    Potassium monitoring  08/04/2021    Creatinine monitoring  08/04/2021    Hepatitis A vaccine  Aged Out    Hib vaccine  Aged Out    Meningococcal (ACWY) vaccine  Aged Out    Varicella vaccine  Discontinued       Hemoglobin A1C (%)   Date Value   09/11/2020 11.7   06/11/2020 12.6 (A)   10/30/2019 9.7             ( goal A1C is < 7)   Microalb/Crt.  Ratio (mcg/mg creat)   Date Value   01/24/2018 779 (H)     LDL Cholesterol (mg/dL)   Date Value   05/29/2013 109 (H)       (goal LDL is <100)   AST (U/L)   Date Value   08/04/2020 26     ALT (U/L)   Date Value   08/04/2020 24     BUN (mg/dL)   Date Value   08/04/2020 17     BP Readings from Last 3 Encounters:   11/10/20 (!) 168/81   09/11/20 (!) 150/87   08/04/20 (!) 151/97          (goal 120/80)    All Future Testing planned in [facility]  Lab Frequency Next Occurrence   HIV Screen Once 05/09/2021   Hemoglobin A1C Once 04/07/2021   Microalbumin, Ur Once 07/28/2021   Lipid Panel Once 31/34/8433   Basic Metabolic Panel Once 42/19/6528               Patient Active Problem List:     Type 1 diabetes mellitus without complication ([address]     Diabetic ketoacidosis without coma ([address]     Smoking Hypomagnesemia     HLD (hyperlipidemia)     Ketosis due to diabetes (HCC)     Nausea     McArdle disease (HCC)     Gastroenteritis     Muscle spasm     Essential hypertension     Gastroesophageal reflux disease without esophagitis           Please address the medication refill and close the encounter. If I can be of assistance, please route to the applicable pool. Thank you.

## 2021-01-12 DIAGNOSIS — E10.9 TYPE 1 DIABETES MELLITUS WITHOUT COMPLICATION (HCC): ICD-10-CM

## 2021-01-12 DIAGNOSIS — K21.9 GASTROESOPHAGEAL REFLUX DISEASE WITHOUT ESOPHAGITIS: ICD-10-CM

## 2021-01-12 RX ORDER — ACETAMINOPHEN 325 MG/1
TABLET, COATED ORAL
Qty: 120 TABLET | Refills: 0 | OUTPATIENT
Start: 2021-01-12

## 2021-01-12 NOTE — TELEPHONE ENCOUNTER
Last visit:   Last Med refill:   Does patient have enough medication for 72 hours: No:     Next Visit Date:  No future appointments. Health Maintenance   Topic Date Due    Hepatitis C screen  1990    Pneumococcal 0-64 years Vaccine (1 of 1 - PPSV23) 05/01/1996    Diabetic foot exam  05/01/2000    Diabetic retinal exam  05/01/2000    HIV screen  05/01/2005    Lipid screen  05/29/2014    Diabetic microalbuminuria test  01/24/2019    Annual Wellness Visit (AWV)  06/20/2019    Flu vaccine (1) 09/01/2020    A1C test (Diabetic or Prediabetic)  12/11/2020    Hepatitis B vaccine (1 of 3 - Risk 3-dose series) 06/11/2021 (Originally 5/1/2009)    DTaP/Tdap/Td vaccine (1 - Tdap) 06/11/2021 (Originally 5/1/2009)    Potassium monitoring  08/04/2021    Creatinine monitoring  08/04/2021    Hepatitis A vaccine  Aged Out    Hib vaccine  Aged Out    Meningococcal (ACWY) vaccine  Aged Out    Varicella vaccine  Discontinued       Hemoglobin A1C (%)   Date Value   09/11/2020 11.7   06/11/2020 12.6 (A)   10/30/2019 9.7             ( goal A1C is < 7)   Microalb/Crt.  Ratio (mcg/mg creat)   Date Value   01/24/2018 779 (H)     LDL Cholesterol (mg/dL)   Date Value   05/29/2013 109 (H)       (goal LDL is <100)   AST (U/L)   Date Value   08/04/2020 26     ALT (U/L)   Date Value   08/04/2020 24     BUN (mg/dL)   Date Value   08/04/2020 17     BP Readings from Last 3 Encounters:   11/10/20 (!) 168/81   09/11/20 (!) 150/87   08/04/20 (!) 151/97          (goal 120/80)    All Future Testing planned in [facility]  Lab Frequency Next Occurrence   HIV Screen Once 05/09/2021   Hemoglobin A1C Once 04/07/2021   Microalbumin, Ur Once 07/28/2021   Lipid Panel Once 62/45/8945   Basic Metabolic Panel Once 69/19/6162               Patient Active Problem List:     Type 1 diabetes mellitus without complication ([address]     Diabetic ketoacidosis without coma ([address]     Smoking     Hypomagnesemia     HLD (hyperlipidemia)     Ketosis due to diabetes ([facility] [address]     Nausea     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 If I can be of assistance, please route to the applicable pool. Thank you.

## 2021-01-13 RX ORDER — INSULIN GLARGINE 100 [IU]/ML
INJECTION, SOLUTION SUBCUTANEOUS
Qty: 15 ML | Refills: 0 | Status: SHIPPED | OUTPATIENT
Start: 2021-01-13 | End: 2021-02-22 | Stop reason: SDUPTHER

## 2021-01-13 RX ORDER — PANTOPRAZOLE SODIUM 40 MG/1
40 TABLET, DELAYED RELEASE ORAL DAILY
Qty: 30 TABLET | Refills: 1 | Status: SHIPPED | OUTPATIENT
Start: 2021-01-13 | End: 2021-01-30 | Stop reason: SDUPTHER

## 2021-01-29 ENCOUNTER — TELEPHONE (OUTPATIENT)
Dept: FAMILY MEDICINE CLINIC | Age: 31
End: 2021-01-29

## 2021-01-29 DIAGNOSIS — E10.9 TYPE 1 DIABETES MELLITUS WITHOUT COMPLICATION (HCC): ICD-10-CM

## 2021-01-29 DIAGNOSIS — K21.9 GASTROESOPHAGEAL REFLUX DISEASE WITHOUT ESOPHAGITIS: ICD-10-CM

## 2021-01-29 NOTE — TELEPHONE ENCOUNTER
E-scribe request for . Please review and e-scribe if applicable. Last Visit Date:    Next Visit Date:  Visit date not found    Hemoglobin A1C (%)   Date Value   09/11/2020 11.7   06/11/2020 12.6 (A)   10/30/2019 9.7             ( goal A1C is < 7)   Microalb/Crt.  Ratio (mcg/mg creat)   Date Value   01/24/2018 779 (H)     LDL Cholesterol (mg/dL)   Date Value   05/29/2013 109 (H)       (goal LDL is <100)   AST (U/L)   Date Value   08/04/2020 26     ALT (U/L)   Date Value   08/04/2020 24     BUN (mg/dL)   Date Value   08/04/2020 17     BP Readings from Last 3 Encounters:   11/10/20 (!) 168/81   09/11/20 (!) 150/87   08/04/20 (!) 151/97          (goal 120/80)        Patient Active Problem List:     Type 1 diabetes mellitus without complication ([address]     Diabetic ketoacidosis without coma ([address]     Smoking     Hypomagnesemia     HLD (hyperlipidemia)     Ketosis due to diabetes (HCC)     Nausea     McArdle disease ([address]     Gastroenteritis     Muscle spasm     Essential hypertension     Gastroesophageal reflux disease without esophagitis      ----Luly Lyn

## 2021-01-30 RX ORDER — PANTOPRAZOLE SODIUM 40 MG/1
40 TABLET, DELAYED RELEASE ORAL DAILY
Qty: 30 TABLET | Refills: 1 | Status: SHIPPED | OUTPATIENT
Start: 2021-01-30 | End: 2021-03-01 | Stop reason: SDUPTHER

## 2021-02-02 ENCOUNTER — HOSPITAL ENCOUNTER (EMERGENCY)
Age: 31
Discharge: HOME | End: 2021-02-02
Attending: EMERGENCY MEDICINE
Payer: MEDICARE

## 2021-02-02 VITALS
TEMPERATURE: 97.7 F | HEART RATE: 87 BPM | RESPIRATION RATE: 14 BRPM | BODY MASS INDEX: 28.25 KG/M2 | DIASTOLIC BLOOD PRESSURE: 90 MMHG | OXYGEN SATURATION: 100 % | WEIGHT: 175 LBS | SYSTOLIC BLOOD PRESSURE: 152 MMHG

## 2021-02-02 DIAGNOSIS — M62.838 SPASM OF MUSCLE: Primary | ICD-10-CM

## 2021-02-02 LAB
-: NORMAL
ABSOLUTE EOS #: 0.12 K/UL (ref 0–0.44)
ABSOLUTE IMMATURE GRANULOCYTE: <0.03 K/UL (ref 0–0.3)
ABSOLUTE LYMPH #: 1.24 K/UL (ref 1.1–3.7)
ABSOLUTE MONO #: 0.22 K/UL (ref 0.1–1.2)
ALLEN TEST: ABNORMAL
AMORPHOUS: NORMAL
ANION GAP SERPL CALCULATED.3IONS-SCNC: 6 MMOL/L (ref 9–17)
BACTERIA: NORMAL
BASOPHILS # BLD: 1 % (ref 0–2)
BASOPHILS ABSOLUTE: 0.04 K/UL (ref 0–0.2)
BETA-HYDROXYBUTYRATE: 0.68 MMOL/L (ref 0.02–0.27)
BILIRUBIN URINE: NEGATIVE
BUN BLDV-MCNC: 21 MG/DL (ref 6–20)
BUN/CREAT BLD: ABNORMAL (ref 9–20)
CALCIUM SERPL-MCNC: 8.7 MG/DL (ref 8.6–10.4)
CARBOXYHEMOGLOBIN: 4.8 % (ref 0–5)
CASTS UA: NORMAL /LPF (ref 0–8)
CHLORIDE BLD-SCNC: 99 MMOL/L (ref 98–107)
CHP ED QC CHECK: YES
CHP ED QC CHECK: YES
CO2: 25 MMOL/L (ref 20–31)
COLOR: YELLOW
COMMENT UA: ABNORMAL
CREAT SERPL-MCNC: 1.33 MG/DL (ref 0.7–1.2)
CRYSTALS, UA: NORMAL /HPF
DIFFERENTIAL TYPE: ABNORMAL
EOSINOPHILS RELATIVE PERCENT: 4 % (ref 1–4)
EPITHELIAL CELLS UA: NORMAL /HPF (ref 0–5)
FIO2: ABNORMAL
GFR AFRICAN AMERICAN: >60 ML/MIN
GFR NON-AFRICAN AMERICAN: >60 ML/MIN
GFR SERPL CREATININE-BSD FRML MDRD: ABNORMAL ML/MIN/{1.73_M2}
GFR SERPL CREATININE-BSD FRML MDRD: ABNORMAL ML/MIN/{1.73_M2}
GLUCOSE BLD-MCNC: 301 MG/DL
GLUCOSE BLD-MCNC: 301 MG/DL (ref 75–110)
GLUCOSE BLD-MCNC: 347 MG/DL
GLUCOSE BLD-MCNC: 347 MG/DL (ref 75–110)
GLUCOSE BLD-MCNC: 362 MG/DL (ref 70–99)
GLUCOSE URINE: ABNORMAL
HCO3 VENOUS: 23.7 MMOL/L (ref 24–30)
HCT VFR BLD CALC: 36.8 % (ref 40.7–50.3)
HEMOGLOBIN: 12.4 G/DL (ref 13–17)
IMMATURE GRANULOCYTES: 0 %
KETONES, URINE: ABNORMAL
LEUKOCYTE ESTERASE, URINE: NEGATIVE
LYMPHOCYTES # BLD: 38 % (ref 24–43)
MCH RBC QN AUTO: 32.2 PG (ref 25.2–33.5)
MCHC RBC AUTO-ENTMCNC: 33.7 G/DL (ref 28.4–34.8)
MCV RBC AUTO: 95.6 FL (ref 82.6–102.9)
METHEMOGLOBIN: ABNORMAL % (ref 0–1.5)
MODE: ABNORMAL
MONOCYTES # BLD: 7 % (ref 3–12)
MUCUS: NORMAL
MYOGLOBIN: 93 NG/ML (ref 28–72)
NEGATIVE BASE EXCESS, VEN: 0.2 MMOL/L (ref 0–2)
NITRITE, URINE: NEGATIVE
NOTIFICATION TIME: ABNORMAL
NOTIFICATION: ABNORMAL
NRBC AUTOMATED: 0 PER 100 WBC
O2 DEVICE/FLOW/%: ABNORMAL
O2 SAT, VEN: 97.6 % (ref 60–85)
OTHER OBSERVATIONS UA: NORMAL
OXYHEMOGLOBIN: ABNORMAL % (ref 95–98)
PATIENT TEMP: 37
PCO2, VEN, TEMP ADJ: ABNORMAL MMHG (ref 39–55)
PCO2, VEN: 38.2 (ref 39–55)
PDW BLD-RTO: 12.5 % (ref 11.8–14.4)
PEEP/CPAP: ABNORMAL
PH UA: 6.5 (ref 5–8)
PH VENOUS: 7.41 (ref 7.32–7.42)
PH, VEN, TEMP ADJ: ABNORMAL (ref 7.32–7.42)
PLATELET # BLD: 234 K/UL (ref 138–453)
PLATELET ESTIMATE: ABNORMAL
PMV BLD AUTO: 9.1 FL (ref 8.1–13.5)
PO2, VEN, TEMP ADJ: ABNORMAL MMHG (ref 30–50)
PO2, VEN: 98.7 (ref 30–50)
POSITIVE BASE EXCESS, VEN: ABNORMAL MMOL/L (ref 0–2)
POTASSIUM SERPL-SCNC: 4.1 MMOL/L (ref 3.7–5.3)
PROTEIN UA: ABNORMAL
PSV: ABNORMAL
PT. POSITION: ABNORMAL
RBC # BLD: 3.85 M/UL (ref 4.21–5.77)
RBC # BLD: ABNORMAL 10*6/UL
RBC UA: NORMAL /HPF (ref 0–4)
RENAL EPITHELIAL, UA: NORMAL /HPF
RESPIRATORY RATE: ABNORMAL
SAMPLE SITE: ABNORMAL
SEG NEUTROPHILS: 50 % (ref 36–65)
SEGMENTED NEUTROPHILS ABSOLUTE COUNT: 1.66 K/UL (ref 1.5–8.1)
SET RATE: ABNORMAL
SODIUM BLD-SCNC: 130 MMOL/L (ref 135–144)
SPECIFIC GRAVITY UA: 1.03 (ref 1–1.03)
TEXT FOR RESPIRATORY: ABNORMAL
TOTAL CK: 732 U/L (ref 39–308)
TOTAL HB: ABNORMAL G/DL (ref 12–16)
TOTAL RATE: ABNORMAL
TRICHOMONAS: NORMAL
TURBIDITY: CLEAR
URINE HGB: ABNORMAL
UROBILINOGEN, URINE: NORMAL
VT: ABNORMAL
WBC # BLD: 3.3 K/UL (ref 3.5–11.3)
WBC # BLD: ABNORMAL 10*3/UL
WBC UA: NORMAL /HPF (ref 0–5)
YEAST: NORMAL

## 2021-02-02 PROCEDURE — 80048 BASIC METABOLIC PNL TOTAL CA: CPT

## 2021-02-02 PROCEDURE — 82805 BLOOD GASES W/O2 SATURATION: CPT

## 2021-02-02 PROCEDURE — 82550 ASSAY OF CK (CPK): CPT

## 2021-02-02 PROCEDURE — 2580000003 HC RX 258: Performed by: STUDENT IN AN ORGANIZED HEALTH CARE EDUCATION/TRAINING PROGRAM

## 2021-02-02 PROCEDURE — 82947 ASSAY GLUCOSE BLOOD QUANT: CPT

## 2021-02-02 PROCEDURE — 99283 EMERGENCY DEPT VISIT LOW MDM: CPT

## 2021-02-02 PROCEDURE — 85025 COMPLETE CBC W/AUTO DIFF WBC: CPT

## 2021-02-02 PROCEDURE — 36415 COLL VENOUS BLD VENIPUNCTURE: CPT

## 2021-02-02 PROCEDURE — 82010 KETONE BODYS QUAN: CPT

## 2021-02-02 PROCEDURE — 81001 URINALYSIS AUTO W/SCOPE: CPT

## 2021-02-02 PROCEDURE — 83874 ASSAY OF MYOGLOBIN: CPT

## 2021-02-02 PROCEDURE — 6370000000 HC RX 637 (ALT 250 FOR IP): Performed by: STUDENT IN AN ORGANIZED HEALTH CARE EDUCATION/TRAINING PROGRAM

## 2021-02-02 RX ORDER — 0.9 % SODIUM CHLORIDE 0.9 %
1000 INTRAVENOUS SOLUTION INTRAVENOUS ONCE
Status: COMPLETED | OUTPATIENT
Start: 2021-02-02 | End: 2021-02-02

## 2021-02-02 RX ORDER — CYCLOBENZAPRINE HCL 5 MG
5 TABLET ORAL 3 TIMES DAILY PRN
Qty: 10 TABLET | Refills: 0 | Status: SHIPPED | OUTPATIENT
Start: 2021-02-02 | End: 2021-02-12

## 2021-02-02 RX ORDER — CYCLOBENZAPRINE HCL 10 MG
5 TABLET ORAL ONCE
Status: COMPLETED | OUTPATIENT
Start: 2021-02-02 | End: 2021-02-02

## 2021-02-02 RX ADMIN — SODIUM CHLORIDE 1000 ML: 9 INJECTION, SOLUTION INTRAVENOUS at 10:44

## 2021-02-02 RX ADMIN — SODIUM CHLORIDE 1000 ML: 9 INJECTION, SOLUTION INTRAVENOUS at 08:44

## 2021-02-02 RX ADMIN — CYCLOBENZAPRINE 5 MG: 10 TABLET, FILM COATED ORAL at 08:45

## 2021-02-02 ASSESSMENT — PAIN SCALES - GENERAL
PAINLEVEL_OUTOF10: 7
PAINLEVEL_OUTOF10: 8

## 2021-02-02 ASSESSMENT — PAIN DESCRIPTION - LOCATION: LOCATION: BACK

## 2021-02-02 NOTE — ED NOTES
Patient resting on cart, rr even and unlabored. Patient given warm blankets. Will continue with plan of care.      Alexandria Finn RN  02/02/21 [id number]

## 2021-02-02 NOTE — ED PROVIDER NOTES
[facility]  ED  Emergency Department Encounter  Emergency Medicine Resident     Pt Name: Arabella Yang  MRN: [id number]  [place] 1990  Date of evaluation: 2/2/21  PCP:  Olimpia Phillips MD    [address] [place] [address]       Chief Complaint   Patient presents with    Back Pain     muscle spasms to back and legs since midnight. recently lifting heavy garbage       HISTORY OFPRESENT ILLNESS  (Location/Symptom, Timing/Onset, Context/Setting, Quality, Duration, Modifying Factors,Severity.)      Arabella Yang is a 27 y.o. male who presents with concern for bilateral anterior and posterior extremity pain, patient does have a history of type 1 diabetes and McArdle disease. Patient does not follow-up with any specialist for these disease processes. Patient states his pain started 12 hours ago while he was at rest, he has not had any medication to assist with the pain. Patient states that he sees a primary care physician who normally prescribes him muscle relaxers. Patient denies any fever, chills, nausea, vomiting. Patient denies any recent Covid-like symptoms. Patient states that he has not eaten or drank anything this morning, point-of-care glucose was 347. Patient states that sugars are normally around 185. Patient denies any IV drug use, loss of bowel or bladder control, any insensate sensation in the perineal region. Patient states that this is typical of his low back pain associated with his disease. PAST MEDICAL / SURGICAL / SOCIAL / FAMILY HISTORY      has a past medical history of Diabetes mellitus ([address] HLD (hyperlipidemia), McArdle disease ([address] and Smoking. has no past surgical history on file.      Social History     Socioeconomic History    Marital status:      Spouse name: Not on file    Number of children: Not on file    Years of education: Not on file    Highest education level: Not on file   Occupational History    Not on file   Social Needs    Financial resource 1/30/21  Yes Yoni Spicer MD   lisinopril (PRINIVIL;ZESTRIL) 20 MG tablet Take 1 tablet by mouth daily 12/28/20  Yes Yoni Spicer MD   acetaminophen (TYLENOL) 325 MG tablet Take 2 tablets by mouth every 6 hours as needed for Pain 11/10/20  Yes Thedora Erin, DO   ibuprofen (ADVIL;MOTRIN) 600 MG tablet Take 1 tablet by mouth every 6 hours as needed for Pain 11/10/20  Yes Thedora [place], DO   ondansetron (ZOFRAN ODT) 4 MG disintegrating tablet Take 1 tablet by mouth every 8 hours as needed for Nausea or Vomiting 5/16/20  Yes Scarlett Puente MD   albuterol sulfate HFA (PROAIR HFA) 108 (90 Base) MCG/ACT inhaler Inhale 2 puffs into the lungs every 4 hours as needed for Wheezing 9/6/19  Yes Nela Menendez PA-C   BASAGLAR KWIKPEN 100 UNIT/ML injection pen INJECT 10 UNITS INTO THE SKIN NIGHTLY 1/13/21   Yoni Spicer MD   Blood Pressure KIT 1 each by Does not apply route as needed (BP check) 9/11/20   Yoni Spicer MD   Insulin Pen Needle (COMFORT EZ PEN NEEDLES) 32G X 6 MM MISC USE AS DIRECTED WITH INSULIN PENS (UP TO 3 TIMES DAILY) 7/2/20   Yoni Spicer MD   Lancets MISC 1 each by Does not apply route daily Use___times daily    Diagnisis:250.0  Diabetes Mellitus____Insulin Dependent___Non-Insulin Dependent 5/18/20   Mariana Soto MD   Alcohol Swabs (ALCOHOL PREP) 70 % PADS 1 box by Does not apply route 3 times daily (with meals) Use_____times per day  Diagnosis: 250.0   Diabetes ___Insulin-Dependent___Non-Insulin Dependent 5/18/20   Mariana Soto MD   blood glucose monitor strips 1 strip by Other route 3 times daily (with meals) Test___times daily    Diagnosis: 250.0   Diabetes Mellitus____Insulin Dependent____Non-Insulin Dependent 5/18/20   Mariana Soto MD   glucose monitoring kit (FREESTYLE) monitoring kit 1 kit by Does not apply route daily 5/16/20   Scarlett Puente MD       REVIEW OFSYSTEMS    (2-9 systems for level 4, 10 or more for level 5)      Constitutional ROS - No recent fevers, No recent chills  Neurological ROS - No Headache, No Syncope  Opthalmologic ROS- No eye pain, No vision changes   ENT ROS - No sore throat, No congestion  Respiratory ROS - No cough, No shortness of breath  Cardiovascular ROS - No chest pain, No palpitations   Gastrointestinal ROS - No abdominal pain, No nausea, No vomiting  Genito-Urinary ROS - No dysuria, Nohematuria  Musculoskeletal ROS - +  back pain, No neck pain, + muscle pain   Dermatological ROS - No wound, No rash  PHYSICAL EXAM   (up to 7 for level 4, 8 or more forlevel 5)      INITIAL VITALS:   ED Triage Vitals   BP Temp Temp Source Pulse Resp SpO2 Height Weight   02/02/21 0802 02/02/21 0758 02/02/21 0758 02/02/21 0802 02/02/21 0802 02/02/21 0802 -- 02/02/21 0802   (!) 152/90 97.7 °F (36.5 °C) Skin 87 14 100 %  175 lb (79.4 kg)       Physical Exam  Constitutional:       Comments: Alert and oriented male, appears uncomfortable, speaking in full sentences, stable vital signs, has dry oropharynx and mucous membranes   HENT:      Right Ear: Ear canal normal.      Nose: Nose normal.      Mouth/Throat:      Mouth: Mucous membranes are dry. Eyes:      Extraocular Movements: Extraocular movements intact. Pupils: Pupils are equal, round, and reactive to light. Neck:      Musculoskeletal: Normal range of motion and neck supple. Cardiovascular:      Rate and Rhythm: Normal rate and regular rhythm. Pulmonary:      Effort: Pulmonary effort is normal.      Breath sounds: Normal breath sounds. Abdominal:      General: Bowel sounds are normal.      Palpations: Abdomen is soft. Musculoskeletal: Normal range of motion. General: No swelling or tenderness.       Comments: Tenderness palpation on paraspinal region on the lumbar spine, no focal neuro deficits on exam, 4-5 strength in bilateral lower extremities, tenderness to palpation and cramping-like sensation on the anterior thighs, no focal neuro deficits on upper extremity exam, GCS 15, no sensory deficits Skin:     General: Skin is warm. Coloration: Skin is not jaundiced or pale. Neurological:      General: No focal deficit present. Mental Status: He is oriented to person, place, and time. Psychiatric:         Mood and Affect: Mood normal.         DIFFERENTIAL  DIAGNOSIS     PLAN (LABS / IMAGING / EKG):  Orders Placed This Encounter   Procedures    CBC WITH AUTO DIFFERENTIAL    Urinalysis Reflex to Culture    Beta-Hydroxybutyrate    BLOOD GAS, VENOUS    BASIC METABOLIC PANEL    CK    Myoglobin, Serum    Microscopic Urinalysis    POCT Glucose    POC Glucose Fingerstick    POCT Glucose    POC Glucose Fingerstick       MEDICATIONS ORDERED:  Orders Placed This Encounter   Medications    cyclobenzaprine (FLEXERIL) tablet 5 mg    0.9 % sodium chloride bolus    0.9 % sodium chloride bolus    cyclobenzaprine (FLEXERIL) 5 MG tablet     Sig: Take 1 tablet by mouth 3 times daily as needed for Muscle spasms     Dispense:  10 tablet     Refill:  0       DDX: Glycogen-storage deficiency, type II diabetic insulin glucose complication, DKA, rhabdomyolysis    Initial MDM/Plan: 27 y.o. male who presents with concern for low back pain and anterior thigh pain, this pain feels similar to his prior McArdle attacks. Patient has stable vital signs, is alert and oriented answering questions appropriately. Patient does have initial high point-of-care glucose, concerning for any glucose diabetic pathologies such as DKA. Plan to do work-up for DKA, glucose studies, CK myoglobin for concern of rhabdomyolysis. Likely patient be discharged home, states that he takes muscle relaxers for prior incidences of this.   Patient does not currently have a specialist for his diseases, was recommended and discussed that patient find an endocrinologist.      DIAGNOSTIC RESULTS / [facility] / [facility]     LABS:  Labs Reviewed   CBC WITH AUTO DIFFERENTIAL - Abnormal; Notable for the following components: Result Value    WBC 3.3 (*)     RBC 3.85 (*)     Hemoglobin 12.4 (*)     Hematocrit 36.8 (*)     All other components within normal limits   URINE RT REFLEX TO CULTURE - Abnormal; Notable for the following components:    Glucose, Ur 3+ (*)     Ketones, Urine TRACE (*)     Urine Hgb SMALL (*)     Protein, UA 3+ (*)     All other components within normal limits   BETA-HYDROXYBUTYRATE - Abnormal; Notable for the following components:    Beta-Hydroxybutyrate 0.68 (*)     All other components within normal limits   BLOOD GAS, VENOUS - Abnormal; Notable for the following components:    pCO2, [place] 38.2 (*)     pO2, [place] 98.7 (*)     HCO3, Venous 23.7 (*)     O2 Sat, [place] 97.6 (*)     All other components within normal limits   BASIC METABOLIC PANEL - Abnormal; Notable for the following components:    Glucose 362 (*)     BUN 21 (*)     CREATININE 1.33 (*)     Sodium 130 (*)     Anion Gap 6 (*)     All other components within normal limits   CK - Abnormal; Notable for the following components: Total  (*)     All other components within normal limits   MYOGLOBIN, SERUM - Abnormal; Notable for the following components:    Myoglobin 93 (*)     All other components within normal limits   POC GLUCOSE FINGERSTICK - Abnormal; Notable for the following components:    POC Glucose 347 (*)     All other components within normal limits   POC GLUCOSE FINGERSTICK - Abnormal; Notable for the following components:    POC Glucose 301 (*)     All other components within normal limits   POCT GLUCOSE - Normal   POCT GLUCOSE - Normal   MICROSCOPIC URINALYSIS         RADIOLOGY:  No results found.       EKG  NA    All EKG's are interpreted by the Emergency Department Physicianwho either signs or Co-signs this chart in the absence of a cardiologist.    EMERGENCY DEPARTMENT COURSE:  ED Course as of Feb 02 1225   Tue Feb 02, 2021   1012 She was found to have elevated CK and myoglobin, not 3 times the upper limit, this is lower than his prior studies. Patient does state that he is feeling better after 1 L normal saline IV fluids and muscle relaxer, likely patient will be discharged home and encouraged to eat and drink appropriately, continue to check insulin and diabetes    [[place]]   1106 Will order 1 more liter normal saline bolus, then likely discharge patient home. Patient states he is feeling much better with fluids.     [[place]]      ED Course User Index  [[place]] Milo Paz DO           PROCEDURES:  None    CONSULTS:  None    CRITICAL CARE:  Please see attending note    FINAL IMPRESSION      1. Spasm of muscle          DISPOSITION / PLAN     DISPOSITION        PATIENT REFERRED TO:  Cl Martinez MD  [address] [place] [id number]  [phone]    Call   As needed    [facility]  [address] [place] [address]  [phone]  Call   As needed      DISCHARGE MEDICATIONS:  Discharge Medication List as of 2/2/2021 11:31 AM      START taking these medications    Details   cyclobenzaprine (FLEXERIL) 5 MG tablet Take 1 tablet by mouth 3 times daily as needed for Muscle spasms, Disp-10 tablet, R-0Print             Joao Diaz DO  Emergency Medicine Resident    (Please note that portions of this note were completed with a voice recognition program.Efforts were made to edit the dictations but occasionally words are mis-transcribed.)        Joao Diaz DO  Resident  02/02/21 8926

## 2021-02-02 NOTE — ED NOTES
Patient to ED with c/o mid to lower back pain since approximately midnight last night. Patient reports cramping and muscle spasms to both his back and bilateral legs. Patient reports some recent heavy lifting of trash bags. Patient states he took an ASA at 0100. Patient alert and oriented x4, placed in gown for exam.  NAD at this time, ambulates to room with steady gait.      Demario Potts RN  02/02/21 5011

## 2021-02-02 NOTE — PROGRESS NOTES
I did not evaluate this patient or participate in their care. I erroneously signed-up for this patient and clicked on their chart.     Stephanie Andres,   Emergency Medicine Resident

## 2021-02-02 NOTE — ED NOTES
The following labs were labeled with patient stickers & tubed to lab;    []Lavender   []On Ice  []Blue  []Green/ Yellow  []Green/ Black []On Ice  []Pink  []Red  []Yellow    []COVID-19 Swab []Rapid    [x]Urine Sample  []Pelvic Cultures    []Blood Cultures       Adeline Gonsalves RN  02/02/21 0236

## 2021-02-02 NOTE — ED NOTES
The following labs were labeled with patient stickers & tubed to lab;    [x]Lavender   [x]On Ice  [x]Blue  [x]Green/ Yellow  [x]Green/ Black [x]On Ice  []Pink  []Red  []Yellow    []COVID-19 Swab []Rapid    []Urine Sample  []Pelvic Cultures    []Blood Cultures       Anh Morales RN  02/02/21 9683

## 2021-02-10 RX ORDER — CYCLOBENZAPRINE HCL 5 MG
TABLET ORAL
Qty: 10 TABLET | Refills: 0 | OUTPATIENT
Start: 2021-02-10

## 2021-02-22 ENCOUNTER — HOSPITAL ENCOUNTER (EMERGENCY)
Age: 31
Discharge: HOME | End: 2021-02-22
Attending: EMERGENCY MEDICINE
Payer: MEDICARE

## 2021-02-22 ENCOUNTER — TELEPHONE (OUTPATIENT)
Dept: FAMILY MEDICINE CLINIC | Age: 31
End: 2021-02-22

## 2021-02-22 VITALS
HEART RATE: 70 BPM | OXYGEN SATURATION: 96 % | SYSTOLIC BLOOD PRESSURE: 150 MMHG | RESPIRATION RATE: 25 BRPM | DIASTOLIC BLOOD PRESSURE: 77 MMHG | TEMPERATURE: 97.4 F

## 2021-02-22 DIAGNOSIS — R73.9 HYPERGLYCEMIA: Primary | ICD-10-CM

## 2021-02-22 DIAGNOSIS — E10.9 TYPE 1 DIABETES MELLITUS WITHOUT COMPLICATION (HCC): ICD-10-CM

## 2021-02-22 DIAGNOSIS — N17.9 ACUTE KIDNEY INJURY SUPERIMPOSED ON CKD (HCC): ICD-10-CM

## 2021-02-22 DIAGNOSIS — N18.9 ACUTE KIDNEY INJURY SUPERIMPOSED ON CKD (HCC): ICD-10-CM

## 2021-02-22 DIAGNOSIS — E86.0 DEHYDRATION: ICD-10-CM

## 2021-02-22 LAB
ABSOLUTE EOS #: 0.06 K/UL (ref 0–0.44)
ABSOLUTE IMMATURE GRANULOCYTE: <0.03 K/UL (ref 0–0.3)
ABSOLUTE LYMPH #: 0.96 K/UL (ref 1.1–3.7)
ABSOLUTE MONO #: 0.2 K/UL (ref 0.1–1.2)
ALBUMIN SERPL-MCNC: 3.6 G/DL (ref 3.5–5.2)
ALBUMIN/GLOBULIN RATIO: 1.4 (ref 1–2.5)
ALLEN TEST: ABNORMAL
ALP BLD-CCNC: 93 U/L (ref 40–129)
ALT SERPL-CCNC: 46 U/L (ref 5–41)
ANION GAP SERPL CALCULATED.3IONS-SCNC: 13 MMOL/L (ref 9–17)
ANION GAP SERPL CALCULATED.3IONS-SCNC: 8 MMOL/L (ref 9–17)
ANION GAP: 9 MMOL/L (ref 7–16)
AST SERPL-CCNC: 42 U/L
BASOPHILS # BLD: 1 % (ref 0–2)
BASOPHILS ABSOLUTE: 0.04 K/UL (ref 0–0.2)
BETA-HYDROXYBUTYRATE: 1.35 MMOL/L (ref 0.02–0.27)
BILIRUB SERPL-MCNC: 0.28 MG/DL (ref 0.3–1.2)
BUN BLDV-MCNC: 28 MG/DL (ref 6–20)
BUN BLDV-MCNC: 31 MG/DL (ref 6–20)
BUN/CREAT BLD: ABNORMAL (ref 9–20)
BUN/CREAT BLD: ABNORMAL (ref 9–20)
CALCIUM SERPL-MCNC: 8.4 MG/DL (ref 8.6–10.4)
CALCIUM SERPL-MCNC: 8.8 MG/DL (ref 8.6–10.4)
CHLORIDE BLD-SCNC: 103 MMOL/L (ref 98–107)
CHLORIDE BLD-SCNC: 95 MMOL/L (ref 98–107)
CHP ED QC CHECK: NORMAL
CO2: 21 MMOL/L (ref 20–31)
CO2: 25 MMOL/L (ref 20–31)
CREAT SERPL-MCNC: 1.66 MG/DL (ref 0.7–1.2)
CREAT SERPL-MCNC: 1.85 MG/DL (ref 0.7–1.2)
DIFFERENTIAL TYPE: ABNORMAL
EOSINOPHILS RELATIVE PERCENT: 1 % (ref 1–4)
FIO2: ABNORMAL
GFR AFRICAN AMERICAN: 52 ML/MIN
GFR AFRICAN AMERICAN: 59 ML/MIN
GFR NON-AFRICAN AMERICAN: 42 ML/MIN
GFR NON-AFRICAN AMERICAN: 43 ML/MIN
GFR NON-AFRICAN AMERICAN: 49 ML/MIN
GFR SERPL CREATININE-BSD FRML MDRD: 51 ML/MIN
GFR SERPL CREATININE-BSD FRML MDRD: ABNORMAL ML/MIN/{1.73_M2}
GLUCOSE BLD-MCNC: 115 MG/DL (ref 75–110)
GLUCOSE BLD-MCNC: 159 MG/DL
GLUCOSE BLD-MCNC: 159 MG/DL (ref 75–110)
GLUCOSE BLD-MCNC: 186 MG/DL (ref 70–99)
GLUCOSE BLD-MCNC: 347 MG/DL (ref 75–110)
GLUCOSE BLD-MCNC: 572 MG/DL (ref 70–99)
GLUCOSE BLD-MCNC: 586 MG/DL (ref 74–100)
HCO3 VENOUS: 23.6 MMOL/L (ref 22–29)
HCT VFR BLD CALC: 38.3 % (ref 40.7–50.3)
HEMOGLOBIN: 12.7 G/DL (ref 13–17)
IMMATURE GRANULOCYTES: 0 %
LIPASE: 11 U/L (ref 13–60)
LYMPHOCYTES # BLD: 22 % (ref 24–43)
MAGNESIUM: 1.7 MG/DL (ref 1.6–2.6)
MCH RBC QN AUTO: 32.1 PG (ref 25.2–33.5)
MCHC RBC AUTO-ENTMCNC: 33.2 G/DL (ref 28.4–34.8)
MCV RBC AUTO: 96.7 FL (ref 82.6–102.9)
MODE: ABNORMAL
MONOCYTES # BLD: 5 % (ref 3–12)
MYOGLOBIN: 125 NG/ML (ref 28–72)
MYOGLOBIN: 146 NG/ML (ref 28–72)
NEGATIVE BASE EXCESS, VEN: 2 (ref 0–2)
NRBC AUTOMATED: 0 PER 100 WBC
O2 DEVICE/FLOW/%: ABNORMAL
O2 SAT, VEN: 84 % (ref 60–85)
PATIENT TEMP: ABNORMAL
PCO2, VEN: 42.1 MM HG (ref 41–51)
PDW BLD-RTO: 12.8 % (ref 11.8–14.4)
PH VENOUS: 7.36 (ref 7.32–7.43)
PHOSPHORUS: 3.2 MG/DL (ref 2.5–4.5)
PLATELET # BLD: 241 K/UL (ref 138–453)
PLATELET ESTIMATE: ABNORMAL
PMV BLD AUTO: 9 FL (ref 8.1–13.5)
PO2, VEN: 50.8 MM HG (ref 30–50)
POC CHLORIDE: 98 MMOL/L (ref 98–107)
POC CREATININE: 1.89 MG/DL (ref 0.51–1.19)
POC HEMATOCRIT: 42 % (ref 41–53)
POC HEMOGLOBIN: 14.2 G/DL (ref 13.5–17.5)
POC IONIZED CALCIUM: 1.13 MMOL/L (ref 1.15–1.33)
POC LACTIC ACID: 1.5 MMOL/L (ref 0.56–1.39)
POC PCO2 TEMP: ABNORMAL MM HG
POC PH TEMP: ABNORMAL
POC PO2 TEMP: ABNORMAL MM HG
POC POTASSIUM: 4.1 MMOL/L (ref 3.5–4.5)
POC SODIUM: 131 MMOL/L (ref 138–146)
POSITIVE BASE EXCESS, VEN: ABNORMAL (ref 0–3)
POTASSIUM SERPL-SCNC: 3.5 MMOL/L (ref 3.7–5.3)
POTASSIUM SERPL-SCNC: 4.3 MMOL/L (ref 3.7–5.3)
RBC # BLD: 3.96 M/UL (ref 4.21–5.77)
RBC # BLD: ABNORMAL 10*6/UL
SAMPLE SITE: ABNORMAL
SEG NEUTROPHILS: 71 % (ref 36–65)
SEGMENTED NEUTROPHILS ABSOLUTE COUNT: 3.14 K/UL (ref 1.5–8.1)
SODIUM BLD-SCNC: 129 MMOL/L (ref 135–144)
SODIUM BLD-SCNC: 136 MMOL/L (ref 135–144)
TOTAL CK: 1565 U/L (ref 39–308)
TOTAL CK: 1832 U/L (ref 39–308)
TOTAL CO2, VENOUS: 25 MMOL/L (ref 23–30)
TOTAL PROTEIN: 6.2 G/DL (ref 6.4–8.3)
WBC # BLD: 4.4 K/UL (ref 3.5–11.3)
WBC # BLD: ABNORMAL 10*3/UL

## 2021-02-22 PROCEDURE — 84295 ASSAY OF SERUM SODIUM: CPT

## 2021-02-22 PROCEDURE — 96374 THER/PROPH/DIAG INJ IV PUSH: CPT

## 2021-02-22 PROCEDURE — 2500000003 HC RX 250 WO HCPCS: Performed by: EMERGENCY MEDICINE

## 2021-02-22 PROCEDURE — 99284 EMERGENCY DEPT VISIT MOD MDM: CPT

## 2021-02-22 PROCEDURE — 82330 ASSAY OF CALCIUM: CPT

## 2021-02-22 PROCEDURE — 85025 COMPLETE CBC W/AUTO DIFF WBC: CPT

## 2021-02-22 PROCEDURE — 83874 ASSAY OF MYOGLOBIN: CPT

## 2021-02-22 PROCEDURE — 82550 ASSAY OF CK (CPK): CPT

## 2021-02-22 PROCEDURE — 82565 ASSAY OF CREATININE: CPT

## 2021-02-22 PROCEDURE — 84100 ASSAY OF PHOSPHORUS: CPT

## 2021-02-22 PROCEDURE — 85014 HEMATOCRIT: CPT

## 2021-02-22 PROCEDURE — 83605 ASSAY OF LACTIC ACID: CPT

## 2021-02-22 PROCEDURE — 82803 BLOOD GASES ANY COMBINATION: CPT

## 2021-02-22 PROCEDURE — 80048 BASIC METABOLIC PNL TOTAL CA: CPT

## 2021-02-22 PROCEDURE — 80053 COMPREHEN METABOLIC PANEL: CPT

## 2021-02-22 PROCEDURE — 83735 ASSAY OF MAGNESIUM: CPT

## 2021-02-22 PROCEDURE — 82010 KETONE BODYS QUAN: CPT

## 2021-02-22 PROCEDURE — 6370000000 HC RX 637 (ALT 250 FOR IP): Performed by: EMERGENCY MEDICINE

## 2021-02-22 PROCEDURE — 96372 THER/PROPH/DIAG INJ SC/IM: CPT

## 2021-02-22 PROCEDURE — 96361 HYDRATE IV INFUSION ADD-ON: CPT

## 2021-02-22 PROCEDURE — 82435 ASSAY OF BLOOD CHLORIDE: CPT

## 2021-02-22 PROCEDURE — 84132 ASSAY OF SERUM POTASSIUM: CPT

## 2021-02-22 PROCEDURE — 2580000003 HC RX 258: Performed by: EMERGENCY MEDICINE

## 2021-02-22 PROCEDURE — 83690 ASSAY OF LIPASE: CPT

## 2021-02-22 PROCEDURE — 82947 ASSAY GLUCOSE BLOOD QUANT: CPT

## 2021-02-22 RX ORDER — DEXTROSE MONOHYDRATE 25 G/50ML
12.5 INJECTION, SOLUTION INTRAVENOUS PRN
Status: DISCONTINUED | OUTPATIENT
Start: 2021-02-22 | End: 2021-02-22 | Stop reason: HOSPADM

## 2021-02-22 RX ORDER — DEXTROSE MONOHYDRATE 50 MG/ML
100 INJECTION, SOLUTION INTRAVENOUS PRN
Status: DISCONTINUED | OUTPATIENT
Start: 2021-02-22 | End: 2021-02-22 | Stop reason: HOSPADM

## 2021-02-22 RX ORDER — 0.9 % SODIUM CHLORIDE 0.9 %
1000 INTRAVENOUS SOLUTION INTRAVENOUS ONCE
Status: COMPLETED | OUTPATIENT
Start: 2021-02-22 | End: 2021-02-22

## 2021-02-22 RX ORDER — NICOTINE POLACRILEX 4 MG
15 LOZENGE BUCCAL PRN
Status: DISCONTINUED | OUTPATIENT
Start: 2021-02-22 | End: 2021-02-22 | Stop reason: HOSPADM

## 2021-02-22 RX ORDER — CALCIUM GLUCONATE 20 MG/ML
1000 INJECTION, SOLUTION INTRAVENOUS ONCE
Status: COMPLETED | OUTPATIENT
Start: 2021-02-22 | End: 2021-02-22

## 2021-02-22 RX ORDER — ONDANSETRON 2 MG/ML
4 INJECTION INTRAMUSCULAR; INTRAVENOUS ONCE
Status: DISCONTINUED | OUTPATIENT
Start: 2021-02-22 | End: 2021-02-22 | Stop reason: HOSPADM

## 2021-02-22 RX ORDER — CYCLOBENZAPRINE HCL 5 MG
5 TABLET ORAL 2 TIMES DAILY PRN
Qty: 10 TABLET | Refills: 0 | Status: SHIPPED | OUTPATIENT
Start: 2021-02-22 | End: 2021-03-01 | Stop reason: SDUPTHER

## 2021-02-22 RX ORDER — INSULIN GLARGINE 100 [IU]/ML
INJECTION, SOLUTION SUBCUTANEOUS
Qty: 15 ML | Refills: 0 | Status: SHIPPED | OUTPATIENT
Start: 2021-02-22 | End: 2021-03-01 | Stop reason: SDUPTHER

## 2021-02-22 RX ADMIN — SODIUM CHLORIDE 1000 ML: 9 INJECTION, SOLUTION INTRAVENOUS at 08:19

## 2021-02-22 RX ADMIN — SODIUM CHLORIDE 1000 ML: 9 INJECTION, SOLUTION INTRAVENOUS at 10:32

## 2021-02-22 RX ADMIN — CALCIUM GLUCONATE 1000 MG: 20 INJECTION, SOLUTION INTRAVENOUS at 09:41

## 2021-02-22 RX ADMIN — INSULIN LISPRO 10 UNITS: 100 INJECTION, SOLUTION INTRAVENOUS; SUBCUTANEOUS at 09:41

## 2021-02-22 ASSESSMENT — ENCOUNTER SYMPTOMS
ABDOMINAL PAIN: 0
VOMITING: 0
SHORTNESS OF BREATH: 0
NAUSEA: 1

## 2021-02-22 NOTE — ED NOTES
Pt states he woke up with a glucometer reading of \"high\", administered 12 units of insulin and came to the ED. He denies any nausea, dizziness or pain. Pt has been compliant with home medications and states he has adequate fluid intake.      Stacy Trotter RN  02/22/21 [facility] ULISES Sosa  02/22/21 6521

## 2021-02-22 NOTE — ED PROVIDER NOTES
[facility]  Emergency Department Encounter  Emergency Medicine Resident     Pt Name: Estevan Smith  MRN: [id number]  [place] 1990  Date of evaluation: 2/22/21  PCP:  Ansley Rhodes MD    [address] [place] [address]       Chief Complaint   Patient presents with    Hyperglycemia       HISTORY OF PRESENT ILLNESS  (Location/Symptom, Timing/Onset, Context/Setting, Quality, Duration, Modifying Factors, Severity.)    Estevan Smith is a 27 y.o. male who has a past medical history of McArdle disease, type 1 diabetes who presents emergency department with nausea, his blood glucose reader not providing reading and only saying \"high\". Patient states that he has had difficulty with the pharmacy and getting his appropriate insulin. He had his quick acting insulin refilled yesterday but is almost completely out of his long-acting insulin. He states that he did take 12 units of insulin of his fast acting prior to coming in today. He states that yesterday he did started to feel nauseated but has not actually vomited. He has been in DKA before. He also has a history of McArdle syndrome and is frequently in diffuse pain from muscle spasms but states that today that is actually well managed. PAST MEDICAL / SURGICAL / SOCIAL / FAMILY HISTORY    has a past medical history of Diabetes mellitus ([address] HLD (hyperlipidemia), McArdle disease ([facility] [address] and Smoking. has no past surgical history on file.     Social History     Socioeconomic History    Marital status:      Spouse name: Not on file    Number of children: Not on file    Years of education: Not on file    Highest education level: Not on file   Occupational History    Not on file   Social Needs    Financial resource strain: Not on file    Food insecurity     Worry: Not on file     Inability: Not on file    Transportation needs     Medical: Not on file     Non-medical: Not on file   Tobacco Use    Smoking status: Current Every Day Smoker Packs/day: 0.50     Years: 10.00     Pack years: 5.00     Types: Cigarettes     Last attempt to quit: 2013     Years since quittin.5    Smokeless tobacco: Never Used   Substance and Sexual Activity    Alcohol use: No    Drug use: Not Currently     Types: Marijuana     Comment: former marijuana    Sexual activity: Yes     Partners: Female   Lifestyle    Physical activity     Days per week: Not on file     Minutes per session: Not on file    Stress: Not on file   Relationships    Social connections     Talks on phone: Not on file     Gets together: Not on file     Attends Restorationist service: Not on file     Active member of club or organization: Not on file     Attends meetings of clubs or organizations: Not on file     Relationship status: Not on file    Intimate partner violence     Fear of current or ex partner: Not on file     Emotionally abused: Not on file     Physically abused: Not on file     Forced sexual activity: Not on file   Other Topics Concern    Not on file   Social History Narrative    Not on file       Family History   Problem Relation Age of Onset    Diabetes Father     Other Father         PAD       Allergies:    Patient has no known allergies. Home Medications:  Prior to Admission medications    Medication Sig Start Date End Date Taking?  Authorizing Provider   insulin glargine [facility]) 100 UNIT/ML injection pen INJECT 10 UNITS INTO THE SKIN NIGHTLY 21  Yes Angeline Tommye Mcburney,    cyclobenzaprine (FLEXERIL) 5 MG tablet Take 1 tablet by mouth 2 times daily as needed for Muscle spasms 2/22/21 3/4/21 Yes Angeline Tommye Mcburney, DO   insulin aspart (NOVOLOG) 100 UNIT/ML injection pen Inject 5 Units into the skin 3 times daily (before meals) 21   Génesis Bacon MD   pantoprazole (PROTONIX) 40 MG tablet Take 1 tablet by mouth daily Take 30 mins before breakfast daily 21   Génesis Bacon MD   lisinopril (PRINIVIL;ZESTRIL) 20 MG tablet Take 1 tablet by mouth daily 12/28/20   Jacki Flores MD   acetaminophen (TYLENOL) 325 MG tablet Take 2 tablets by mouth every 6 hours as needed for Pain 11/10/20   Maik Ziegler DO   ibuprofen (ADVIL;MOTRIN) 600 MG tablet Take 1 tablet by mouth every 6 hours as needed for Pain 11/10/20   Maik Ziegler DO   Blood Pressure KIT 1 each by Does not apply route as needed (BP check) 9/11/20   Jacki Flores MD   Insulin Pen Needle (COMFORT EZ PEN NEEDLES) 32G X 6 MM MISC USE AS DIRECTED WITH INSULIN PENS (UP TO 3 TIMES DAILY) 7/2/20   Jacki Flores MD   Lancets MISC 1 each by Does not apply route daily Use___times daily    Diagnisis:250.0  Diabetes Mellitus____Insulin Dependent___Non-Insulin Dependent 5/18/20   Chey Narayan MD   Alcohol Swabs (ALCOHOL PREP) 70 % PADS 1 box by Does not apply route 3 times daily (with meals) Use_____times per day  Diagnosis: 250.0   Diabetes ___Insulin-Dependent___Non-Insulin Dependent 5/18/20   Chey Narayan MD   blood glucose monitor strips 1 strip by Other route 3 times daily (with meals) Test___times daily    Diagnosis: 250.0   Diabetes Mellitus____Insulin Dependent____Non-Insulin Dependent 5/18/20   Chey Narayan MD   glucose monitoring kit (FREESTYLE) monitoring kit 1 kit by Does not apply route daily 5/16/20   Hollie Steiner MD   ondansetron (ZOFRAN ODT) 4 MG disintegrating tablet Take 1 tablet by mouth every 8 hours as needed for Nausea or Vomiting 5/16/20   Hollie Steiner MD   albuterol sulfate HFA (PROAIR HFA) 108 (90 Base) MCG/ACT inhaler Inhale 2 puffs into the lungs every 4 hours as needed for Wheezing 9/6/19   Stew Walton PA-C       REVIEW OF SYSTEMS    (2-9 systems for level 4, 10 or more for level 5)    Review of Systems   Constitutional: Negative for chills, diaphoresis and fever. Respiratory: Negative for shortness of breath. Cardiovascular: Negative for chest pain. Gastrointestinal: Positive for nausea. Negative for abdominal pain and vomiting.    Endocrine: Negative for polydipsia and polyuria. Genitourinary: Negative for decreased urine volume. Musculoskeletal: Negative for myalgias. Neurological: Negative for dizziness, syncope, weakness and light-headedness. PHYSICAL EXAM   (up to 7 for level 4, 8 or more for level 5)    VITALS:   Vitals:    02/22/21 0736 02/22/21 0816 02/22/21 0823 02/22/21 1132   BP:  (!) 152/77  (!) 150/77   Pulse:  75  70   Resp:  19  25   Temp: 97.4 °F (36.3 °C)  97.4 °F (36.3 °C)    TempSrc: Temporal      SpO2:  97%  96%       Physical Exam  Vitals signs and nursing note reviewed. Constitutional:       General: He is not in acute distress. Appearance: He is well-developed and underweight. He is not diaphoretic. HENT:      Head: Normocephalic and atraumatic. Right Ear: External ear normal.      Left Ear: External ear normal.      Mouth/Throat:      Mouth: Mucous membranes are dry. Eyes:      Conjunctiva/sclera: Conjunctivae normal.   Neck:      Musculoskeletal: Normal range of motion. Cardiovascular:      Rate and Rhythm: Normal rate and regular rhythm. Heart sounds: Normal heart sounds. Pulmonary:      Effort: Pulmonary effort is normal. No respiratory distress. Breath sounds: Normal breath sounds. No wheezing, rhonchi or rales. Abdominal:      General: There is no distension. Palpations: Abdomen is soft. Tenderness: There is no abdominal tenderness. There is no guarding or rebound. Musculoskeletal: Normal range of motion. Right lower leg: No edema. Left lower leg: No edema. Skin:     General: Skin is warm and dry. Findings: No rash. Neurological:      General: No focal deficit present. Mental Status: He is alert.    Psychiatric:         Behavior: Behavior normal.         DIFFERENTIAL  DIAGNOSIS   PLAN (LABS / IMAGING / EKG):  Orders Placed This Encounter   Procedures    CBC WITH AUTO DIFFERENTIAL    COMPREHENSIVE METABOLIC PANEL    LIPASE    BETA-HYDROXYBUTYRATE    MYOGLOBIN, SERUM    CK    Hemoglobin and hematocrit, blood    SODIUM (POC)    POTASSIUM (POC)    CHLORIDE (POC)    CALCIUM, IONIC (POC)    Magnesium    Phosphorus    CK    MYOGLOBIN, SERUM    BASIC METABOLIC PANEL    HYPOGLYCEMIA TREATMENT: blood glucose less than 50 mg/dL and patient  ALERT and TOLERATING PO    HYPOGLYCEMIA TREATMENT: blood glucose less than 70 mg/dL and patient ALERT and TOLERATING PO    HYPOGLYCEMIA TREATMENT: blood glucose less than 70 mg/dL and patient NOT ALERT or NPO    POCT Glucose    POC Blood Gas and Chemistry    Venous Blood Gas, POC    Creatinine W/GFR Point of Care    Lactic Acid, POC    POCT Glucose    Anion Gap (Calc) POC    POCT Glucose    POC Glucose Fingerstick    POC Glucose Fingerstick    POC Glucose Fingerstick       MEDICATIONS ORDERED:  Orders Placed This Encounter   Medications    ondansetron (ZOFRAN) injection 4 mg    0.9 % sodium chloride bolus    DISCONTD: calcium gluconate 1,000 mg in dextrose 5 % 100 mL IVPB    insulin lispro (HUMALOG) injection vial 10 Units    glucose (GLUTOSE) 40 % oral gel 15 g    dextrose 50 % IV solution    glucagon (rDNA) injection 1 mg    dextrose 5 % solution    calcium gluconate 1000 mg in sodium chloride 50 mL    0.9 % sodium chloride bolus    insulin glargine (BASAGLAR KWIKPEN) 100 UNIT/ML injection pen     Sig: INJECT 10 UNITS INTO THE SKIN NIGHTLY     Dispense:  15 mL     Refill:  0    cyclobenzaprine (FLEXERIL) 5 MG tablet     Sig: Take 1 tablet by mouth 2 times daily as needed for Muscle spasms     Dispense:  10 tablet     Refill:  0     DIAGNOSTIC RESULTS / EMERGENCYDEPARTMENT COURSE / MDM   LABS:  Labs Reviewed   CBC WITH AUTO DIFFERENTIAL - Abnormal; Notable for the following components:       Result Value    RBC 3.96 (*)     Hemoglobin 12.7 (*)     Hematocrit 38.3 (*)     Seg Neutrophils 71 (*)     Lymphocytes 22 (*)     Absolute Lymph # 0.96 (*)     All other components within normal limits COMPREHENSIVE METABOLIC PANEL - Abnormal; Notable for the following components:    Glucose 572 (*)     BUN 31 (*)     CREATININE 1.85 (*)     Calcium 8.4 (*)     Sodium 129 (*)     Chloride 95 (*)     ALT 46 (*)     AST 42 (*)     Total Bilirubin 0.28 (*)     Total Protein 6.2 (*)     GFR Non- 43 (*)     GFR  52 (*)     All other components within normal limits   LIPASE - Abnormal; Notable for the following components:    Lipase 11 (*)     All other components within normal limits   BETA-HYDROXYBUTYRATE - Abnormal; Notable for the following components:    Beta-Hydroxybutyrate 1.35 (*)     All other components within normal limits   MYOGLOBIN, SERUM - Abnormal; Notable for the following components:    Myoglobin 146 (*)     All other components within normal limits   CK - Abnormal; Notable for the following components: Total CK 1,832 (*)     All other components within normal limits   SODIUM (POC) - Abnormal; Notable for the following components:    POC Sodium 131 (*)     All other components within normal limits   CALCIUM, IONIC (POC) - Abnormal; Notable for the following components:    POC Ionized Calcium 1.13 (*)     All other components within normal limits   CK - Abnormal; Notable for the following components:     Total CK 1,565 (*)     All other components within normal limits   MYOGLOBIN, SERUM - Abnormal; Notable for the following components:    Myoglobin 125 (*)     All other components within normal limits   BASIC METABOLIC PANEL - Abnormal; Notable for the following components:    Glucose 186 (*)     BUN 28 (*)     CREATININE 1.66 (*)     Potassium 3.5 (*)     Anion Gap 8 (*)     GFR Non- 49 (*)     GFR  59 (*)     All other components within normal limits   VENOUS BLOOD GAS, POINT OF CARE - Abnormal; Notable for the following components:    pO2, [place] 50.8 (*)     All other components within normal limits   CREATININE W/GFR POINT OF CARE - Abnormal; Notable for the following components:    POC Creatinine 1.89 (*)     GFR Comment 51 (*)     GFR Non- 42 (*)     All other components within normal limits   LACTIC ACID,POINT OF CARE - Abnormal; Notable for the following components:    POC Lactic Acid 1.50 (*)     All other components within normal limits   POCT GLUCOSE - Abnormal; Notable for the following components:    POC Glucose 586 (*)     All other components within normal limits   POC GLUCOSE FINGERSTICK - Abnormal; Notable for the following components:    POC Glucose 347 (*)     All other components within normal limits   POC GLUCOSE FINGERSTICK - Abnormal; Notable for the following components:    POC Glucose 159 (*)     All other components within normal limits   POC GLUCOSE FINGERSTICK - Abnormal; Notable for the following components:    POC Glucose 115 (*)     All other components within normal limits   POCT GLUCOSE - Normal   HGB/HCT   POTASSIUM (POC)   CHLORIDE (POC)   MAGNESIUM   PHOSPHORUS   POC BLOOD GAS AND CHEMISTRY   ANION GAP (CALC) POC       RADIOLOGY:  No results found. EMERGENCY DEPARTMENT COURSE:  ED Course as of Feb 22 1254   Mon Feb 22, 2021   0858 Beta-Hydroxybutyrate(!): 1.35 [AO]   0858 Total CK(!): 1,832 [AO]   0859 Myoglobin(!): 146 [AO]   0859 Creatinine(!): 1.85 [AO]   0859 Anion Gap: 13 [AO]   0859 CO2: 21 [AO]   0859 CBC WITH AUTO DIFFERENTIAL(!):    WBC 4.4   RBC 3.96(!)   Hemoglobin Quant 12.7(!)   Hematocrit 38.3(!)   MCV 96.7   MCH 32.1   MCHC 33.2   RDW 12.8   Platelet Count 099   MPV 9.0   NRBC Automated 0.0   Differential Type NOT REPORTED   Seg Neutrophils 71(!)   Lymphocytes 22(!)   Monocytes 5   Eosinophils % 1   Basophils 1   Immature Granulocytes 0   Segs Absolute 3.14   Absolute Lymph # 0.96(!)   Absolute Mono # 0.20   Absolute Eos # 0.06   Basophils Absolute 0.04   Absolute Immature Granulocyte <0. ... [AO]   0859 pH, Moreno: 7.357 [AO]   0907 Glucose(!!): 572 [AO]   1028 POC Glucose(!): 347 [AO]   1029 Magnesium: 1.7 [AO]   1029 Phosphorus: 3.2 [AO]   1135 POC Glucose(!): 159 [AO]   1135 Glucose corrected. Will repeat BMP, CK, Santos as they are elevated from prior     [AO]   1218 CK(!):    Total CK 1,565(!) [AO]      ED Course User Index  [AO] Angeline Arora 1721, DO       MDM  Number of Diagnoses or Management Options  Acute kidney injury superimposed on CKD ([facility] [address]  Dehydration  Hyperglycemia  Diagnosis management comments: 80-year-old male with history of type 1 diabetes insulin-dependent McArdle disease presents emergency department with concern for high blood sugar. Meter read high at home. Patient with blood sugar in the 500s here. JASIEL on CKD. CK and myoglobin elevated. Patient given 2 L of fluids, replace calcium, given 10 units of insulin subcu. Blood sugar down into the low 100s. Patient states that he felt much better. We discussed adequate hydration at home due to his elevated CK and myoglobin. He does wish to be discharged home and understands importance of drinking appropriate mental water. I did give him a prescription for the refill of his long-acting insulin as he states that there is currently an issue between his pharmacy and his primary care doctor. He also requested a refill on his prescription for his muscle spasms. He takes Flexeril per review. It is appropriate to give him more based on his last fill date and quantity given. Amount and/or Complexity of Data Reviewed  Clinical lab tests: ordered and reviewed  Review and summarize past medical records: yes  Discuss the patient with other providers: yes    Patient Progress  Patient progress: stable    CONSULTS:  None    CRITICAL CARE:  Please see attending note    FINAL IMPRESSION     1. Hyperglycemia    2. Type 1 diabetes mellitus without complication (HCC)    3. Acute kidney injury superimposed on CKD ([facility] [address]    4.  Dehydration         DISPOSITION / PLAN   DISPOSITION Decision To Discharge 02/22/2021 12:27:08 PM      Evaluation and treatment course in the ED, and plan of care upon discharge was discussed in length with the patient. Patient had no further questions prior to being discharged and was instructed to return to the ED for new or worsening symptoms. Any changes to existing medications or new prescriptions were reviewed with patient and they expressed understanding of how to correctly take their medications and the possible side effects. PATIENT REFERRED TO:  Shakeel Wren MD  [address] [place] [address] [address] [facility]  [address] [facility] [address]  [phone]    As needed, If symptoms worsen      DISCHARGE MEDICATIONS:  Discharge Medication List as of 2/22/2021 12:42 PM      START taking these medications    Details   cyclobenzaprine (FLEXERIL) 5 MG tablet Take 1 tablet by mouth 2 times daily as needed for Muscle spasms, Disp-10 tablet, R-0Print             Loraine Carmona, [address] [facility]  Emergency Medicine Resident Physician, PGY-3    (Please note that portions of this note were completed with a voice recognition program.  Efforts were made to edit the dictations but occasionally words are mis-transcribed.)        Angeline Arora 1721, DO  Resident  02/22/21 0450

## 2021-02-22 NOTE — TELEPHONE ENCOUNTER
Patient is currently in the hospital for DKA wife called and scheduled follow up visit for March 1st but is concerned about his insulin when he is sent home. Advised wife to wait until he discharged from the hospital and see what the discharge orders are, then she can call back if she has any questions.

## 2021-02-22 NOTE — ED PROVIDER NOTES
[facility] ED     Emergency Department     Faculty Attestation    I performed a history and physical examination of the patient and discussed management with the resident. I reviewed the residents note and agree with the documented findings and plan of care. Any areas of disagreement are noted on the chart. I was personally present for the key portions of any procedures. I have documented in the chart those procedures where I was not present during the key portions. I have reviewed the emergency nurses triage note. I agree with the chief complaint, past medical history, past surgical history, allergies, medications, social and family history as documented unless otherwise noted below. For Physician Assistant/ Nurse Practitioner cases/documentation I have personally evaluated this patient and have completed at least one if not all key elements of the E/M (history, physical exam, and MDM). Additional findings are as noted. Patient with history of diabetes as well as McArdle's disease presents with hyperglycemia. He says that he checked his blood sugar this morning and the meter read high. He denies fever, chest pain, shortness of breath, abdominal pain, vomiting or diarrhea. On exam, patient is resting comfortably in the bed. He is alert and oriented and answering questions appropriately. Lungs are clear to auscultation bilaterally and heart sounds are normal.  Abdomen is soft and nontender. Will check labs and reassess.       Jeremiah Mendoza MD  Attending Emergency  Physician              Gema Juárez MD  02/22/21 6250

## 2021-03-01 ENCOUNTER — OFFICE VISIT (OUTPATIENT)
Dept: FAMILY MEDICINE CLINIC | Age: 31
End: 2021-03-01
Payer: MEDICARE

## 2021-03-01 VITALS
HEART RATE: 86 BPM | DIASTOLIC BLOOD PRESSURE: 83 MMHG | SYSTOLIC BLOOD PRESSURE: 137 MMHG | TEMPERATURE: 97.1 F | HEIGHT: 66 IN | BODY MASS INDEX: 27.19 KG/M2 | WEIGHT: 169.2 LBS

## 2021-03-01 DIAGNOSIS — I10 ESSENTIAL HYPERTENSION: ICD-10-CM

## 2021-03-01 DIAGNOSIS — E10.9 TYPE 1 DIABETES MELLITUS WITHOUT COMPLICATION (HCC): Primary | ICD-10-CM

## 2021-03-01 DIAGNOSIS — K21.9 GASTROESOPHAGEAL REFLUX DISEASE WITHOUT ESOPHAGITIS: ICD-10-CM

## 2021-03-01 LAB
CHP ED QC CHECK: ABNORMAL
GLUCOSE BLD-MCNC: 31 MG/DL
HBA1C MFR BLD: 10.4 %

## 2021-03-01 PROCEDURE — 2022F DILAT RTA XM EVC RTNOPTHY: CPT | Performed by: STUDENT IN AN ORGANIZED HEALTH CARE EDUCATION/TRAINING PROGRAM

## 2021-03-01 PROCEDURE — 83036 HEMOGLOBIN GLYCOSYLATED A1C: CPT | Performed by: STUDENT IN AN ORGANIZED HEALTH CARE EDUCATION/TRAINING PROGRAM

## 2021-03-01 PROCEDURE — 3046F HEMOGLOBIN A1C LEVEL >9.0%: CPT | Performed by: STUDENT IN AN ORGANIZED HEALTH CARE EDUCATION/TRAINING PROGRAM

## 2021-03-01 PROCEDURE — G8419 CALC BMI OUT NRM PARAM NOF/U: HCPCS | Performed by: STUDENT IN AN ORGANIZED HEALTH CARE EDUCATION/TRAINING PROGRAM

## 2021-03-01 PROCEDURE — G8484 FLU IMMUNIZE NO ADMIN: HCPCS | Performed by: STUDENT IN AN ORGANIZED HEALTH CARE EDUCATION/TRAINING PROGRAM

## 2021-03-01 PROCEDURE — G8427 DOCREV CUR MEDS BY ELIG CLIN: HCPCS | Performed by: STUDENT IN AN ORGANIZED HEALTH CARE EDUCATION/TRAINING PROGRAM

## 2021-03-01 PROCEDURE — 4004F PT TOBACCO SCREEN RCVD TLK: CPT | Performed by: STUDENT IN AN ORGANIZED HEALTH CARE EDUCATION/TRAINING PROGRAM

## 2021-03-01 PROCEDURE — 99211 OFF/OP EST MAY X REQ PHY/QHP: CPT | Performed by: FAMILY MEDICINE

## 2021-03-01 PROCEDURE — 82962 GLUCOSE BLOOD TEST: CPT | Performed by: STUDENT IN AN ORGANIZED HEALTH CARE EDUCATION/TRAINING PROGRAM

## 2021-03-01 PROCEDURE — 99213 OFFICE O/P EST LOW 20 MIN: CPT | Performed by: STUDENT IN AN ORGANIZED HEALTH CARE EDUCATION/TRAINING PROGRAM

## 2021-03-01 RX ORDER — INSULIN GLARGINE 100 [IU]/ML
INJECTION, SOLUTION SUBCUTANEOUS
Qty: 15 ML | Refills: 0 | Status: SHIPPED | OUTPATIENT
Start: 2021-03-01 | End: 2021-04-22 | Stop reason: SDUPTHER

## 2021-03-01 RX ORDER — ONDANSETRON 4 MG/1
4 TABLET, ORALLY DISINTEGRATING ORAL EVERY 8 HOURS PRN
Qty: 20 TABLET | Refills: 0 | Status: SHIPPED | OUTPATIENT
Start: 2021-03-01 | End: 2021-05-23

## 2021-03-01 RX ORDER — CYCLOBENZAPRINE HCL 5 MG
5 TABLET ORAL 2 TIMES DAILY PRN
Qty: 10 TABLET | Refills: 0 | Status: SHIPPED | OUTPATIENT
Start: 2021-03-01 | End: 2021-04-12

## 2021-03-01 RX ORDER — LANCETS 30 GAUGE
1 EACH MISCELLANEOUS DAILY
Qty: 100 EACH | Refills: 11 | Status: SHIPPED | OUTPATIENT
Start: 2021-03-01

## 2021-03-01 RX ORDER — UBIQUINOL 100 MG
1 CAPSULE ORAL
Qty: 100 EACH | Refills: 11 | Status: SHIPPED | OUTPATIENT
Start: 2021-03-01

## 2021-03-01 RX ORDER — GLUCOSAMINE HCL/CHONDROITIN SU 500-400 MG
1 CAPSULE ORAL
Qty: 100 STRIP | Refills: 11 | Status: SHIPPED | OUTPATIENT
Start: 2021-03-01

## 2021-03-01 RX ORDER — PANTOPRAZOLE SODIUM 40 MG/1
40 TABLET, DELAYED RELEASE ORAL DAILY
Qty: 30 TABLET | Refills: 1 | Status: SHIPPED | OUTPATIENT
Start: 2021-03-01 | End: 2021-05-05 | Stop reason: SDUPTHER

## 2021-03-01 RX ORDER — LANCETS 33 GAUGE
EACH MISCELLANEOUS
Qty: 100 EACH | Refills: 3 | Status: SHIPPED | OUTPATIENT
Start: 2021-03-01 | End: 2021-11-15

## 2021-03-01 RX ORDER — LISINOPRIL 20 MG/1
20 TABLET ORAL DAILY
Qty: 90 TABLET | Refills: 1 | Status: SHIPPED | OUTPATIENT
Start: 2021-03-01 | End: 2021-04-22

## 2021-03-01 ASSESSMENT — ENCOUNTER SYMPTOMS
COUGH: 0
SHORTNESS OF BREATH: 0
COLOR CHANGE: 0
CHEST TIGHTNESS: 0
APNEA: 0

## 2021-03-01 ASSESSMENT — PATIENT HEALTH QUESTIONNAIRE - PHQ9
2. FEELING DOWN, DEPRESSED OR HOPELESS: 0
SUM OF ALL RESPONSES TO PHQ QUESTIONS 1-9: 0

## 2021-03-01 NOTE — PROGRESS NOTES
Visit Information    Have you changed or started any medications since your last visit including any over-the-counter medicines, vitamins, or herbal medicines? no   Have you stopped taking any of your medications? Is so, why? -  no  Are you having any side effects from any of your medications? - no    Have you seen any other physician or provider since your last visit?  no   Have you had any other diagnostic tests since your last visit?  no   Have you been seen in the emergency room and/or had an admission in a hospital since we last saw you?  no   Have you had your routine dental cleaning in the past 6 months?  no     Do you have an active MyChart account? If no, what is the barrier?   Yes    Patient Care Team:  Joan Cosme MD as PCP - General (Family Medicine)  Yanira Jones MD as PCP - [facility] Provider    Medical History Review  Past Medical, Family, and Social History reviewed and does contribute to the patient presenting condition    Health Maintenance   Topic Date Due    Hepatitis C screen  1990    Pneumococcal 0-64 years Vaccine (1 of 1 - PPSV23) 05/01/1996    Diabetic foot exam  05/01/2000    Diabetic retinal exam  05/01/2000    HIV screen  05/01/2005    COVID-19 Vaccine (1 of 2) 05/01/2006    Lipid screen  05/29/2014    Diabetic microalbuminuria test  01/24/2019    Annual Wellness Visit (AWV)  06/20/2019    Flu vaccine (1) 09/01/2020    A1C test (Diabetic or Prediabetic)  12/11/2020    Hepatitis B vaccine (1 of 3 - Risk 3-dose series) 06/11/2021 (Originally 5/1/2009)    DTaP/Tdap/Td vaccine (1 - Tdap) 06/11/2021 (Originally 5/1/2009)    Potassium monitoring  02/22/2022    Creatinine monitoring  02/22/2022    Hepatitis A vaccine  Aged Out    Hib vaccine  Aged Out    Meningococcal (ACWY) vaccine  Aged Out    Varicella vaccine  Discontinued

## 2021-03-01 NOTE — PATIENT INSTRUCTIONS
Thank you for letting us take care of you today. We hope all your questions were addressed. If a question was overlooked or something else comes to mind after you return home, please contact a member of your Care Team listed below. Your Care Team at [place] [address] is Team #5  Tobin Halsted, MD (Faculty)  Ashley Leon MD (Resident)  Micaela Townsend MD (Resident)  Miriam Gunter MD (Resident)  HARINDER Riggs. ,TERRY MACIAS, TERRY Borjas ([facility] office)  [facility] office)  Benjamin Lord, [address] (Clinical Practice Manager)  Maicol Pineda [facility] (Clinical Pharmacist)       Office phone number: [phone]    If you need to get in right away due to illness, please be advised we have \"Same Day\" appointments available Monday-Friday. Please call us at [phone] option #3 to schedule your \"Same Day\" appointment.

## 2021-03-01 NOTE — PROGRESS NOTES
Subjective:    Veronica Pena is a 27 y.o. male with  has a past medical history of Diabetes mellitus ([address] HLD (hyperlipidemia), McArdle disease ([address] and Smoking. Presented to the office today for:  Chief Complaint   Patient presents with   [facility] ED Follow-up     here for ed followup from [facility]. [place] Diabetes     Diabetes       HPI  Patient is a 80-year-old male with significant past medical history of hypertension, gastroenteritis, GERD, type 1 diabetes who presented to the office today after his ED visit on 2/22. Patient went in due to hyperglycemia, was given Lantus in the emergency department. Labs were clear, no signs of DKA, although patient has been admitted for DKA in the past.  Patient states today he is feeling much better, does have some sweats and his blood sugar levels are 32, given some snacks in the office. Patient states he does not have a problem with affordability of his medications but there are issues with picking up his medications on time at his pharmacy. Does require multiple refills. No other complaints. Review of Systems   Constitutional: Positive for diaphoresis. Negative for activity change, appetite change, chills, fatigue and fever. Respiratory: Negative for apnea, cough, chest tightness and shortness of breath. Musculoskeletal: Negative for joint swelling. Skin: Negative for color change, pallor, rash and wound. Neurological: Negative for dizziness, tremors, facial asymmetry, light-headedness and headaches. Psychiatric/Behavioral: Negative for agitation, behavioral problems and confusion. The patient is not nervous/anxious.                   The patient has a   Family History   Problem Relation Age of Onset    Diabetes Father     Other Father         PAD       Objective: /83 (Site: Left Upper Arm, Position: Sitting, Cuff Size: Medium Adult)   Pulse 86   Temp 97.1 °F (36.2 °C) (Temporal)   Ht 5' 6\" (1.676 m)   Wt 169 lb 3.2 oz (76.7 kg)   BMI 27.31 kg/m²    BP Readings from Last 3 Encounters:   03/01/21 137/83   02/22/21 (!) 150/77   02/02/21 (!) 152/90       Physical Exam  Vitals signs and nursing note reviewed. Constitutional:       Appearance: Normal appearance. He is normal weight. Cardiovascular:      Rate and Rhythm: Normal rate and regular rhythm. Pulses: Normal pulses. Heart sounds: Normal heart sounds. Pulmonary:      Effort: Pulmonary effort is normal.      Breath sounds: Normal breath sounds. Abdominal:      General: Abdomen is flat. Bowel sounds are normal.      Palpations: Abdomen is soft. Neurological:      General: No focal deficit present. Mental Status: He is alert and oriented to person, place, and time. Mental status is at baseline. Psychiatric:         Mood and Affect: Mood normal.         Behavior: Behavior normal.         Thought Content: Thought content normal.         Judgment: Judgment normal.         Lab Results   Component Value Date    WBC 4.4 02/22/2021    HGB 12.7 (L) 02/22/2021    HCT 38.3 (L) 02/22/2021     02/22/2021    CHOL 172 05/29/2013    TRIG 58 05/29/2013    HDL 51 05/29/2013    ALT 46 (H) 02/22/2021    AST 42 (H) 02/22/2021     02/22/2021    K 3.5 (L) 02/22/2021     02/22/2021    CREATININE 1.66 (H) 02/22/2021    BUN 28 (H) 02/22/2021    CO2 25 02/22/2021    TSH 0.58 08/14/2013    INR 1.0 03/23/2018    LABA1C 10.4 03/01/2021    LABMICR 779 (H) 01/24/2018     Lab Results   Component Value Date    CALCIUM 8.8 02/22/2021    PHOS 3.2 02/22/2021     Lab Results   Component Value Date    LDLCHOLESTEROL 109 (H) 05/29/2013       Assessment and Plan:    1. Type 1 diabetes mellitus without complication (HCC)  - POCT glycosylated hemoglobin (Hb A1C)  - Basic Metabolic Panel;  Future - Alcohol Swabs (ALCOHOL PREP) 70 % PADS; 1 box by Does not apply route 3 times daily (with meals) Use_____times per day  Diagnosis: 250.0   Diabetes ___Insulin-Dependent___Non-Insulin Dependent  Dispense: 100 each; Refill: 11  - blood glucose monitor strips; 1 strip by Other route 3 times daily (with meals) Test___times daily    Diagnosis: 250.0   Diabetes Mellitus____Insulin Dependent____Non-Insulin Dependent  Dispense: 100 strip; Refill: 11  - cyclobenzaprine (FLEXERIL) 5 MG tablet; Take 1 tablet by mouth 2 times daily as needed for Muscle spasms  Dispense: 10 tablet; Refill: 0  - insulin aspart (NOVOLOG) 100 UNIT/ML injection pen; Inject 5 Units into the skin 3 times daily (before meals)  Dispense: 5 pen; Refill: 2  - insulin glargine (BASAGLAR KWIKPEN) 100 UNIT/ML injection pen; INJECT 15 UNITS INTO THE SKIN NIGHTLY  Dispense: 15 mL; Refill: 0  - Insulin Pen Needle (COMFORT EZ PEN NEEDLES) 32G X 6 MM MISC; USE AS DIRECTED WITH INSULIN PENS (UP TO 3 TIMES DAILY)  Dispense: 100 each; Refill: 3  - Lancets MISC; 1 each by Does not apply route daily Use___times daily    Diagnisis:250.0  Diabetes Mellitus____Insulin Dependent___Non-Insulin Dependent  Dispense: 100 each; Refill: 11  - lisinopril (PRINIVIL;ZESTRIL) 20 MG tablet; Take 1 tablet by mouth daily  Dispense: 90 tablet; Refill: 1  - POCT Glucose    2. Essential hypertension  - lisinopril (PRINIVIL;ZESTRIL) 20 MG tablet; Take 1 tablet by mouth daily  Dispense: 90 tablet; Refill: 1    3. Gastroesophageal reflux disease without esophagitis  - pantoprazole (PROTONIX) 40 MG tablet; Take 1 tablet by mouth daily Take 30 mins before breakfast daily  Dispense: 30 tablet; Refill: 1  - ondansetron (ZOFRAN ODT) 4 MG disintegrating tablet; Take 1 tablet by mouth every 8 hours as needed for Nausea or Vomiting  Dispense: 20 tablet;  Refill: 0      Requested Prescriptions     Signed Prescriptions Disp Refills    Alcohol Swabs (ALCOHOL PREP) 70 % PADS 100 each 11 Si box by Does not apply route 3 times daily (with meals) Use_____times per day  Diagnosis: 250.0   Diabetes ___Insulin-Dependent___Non-Insulin Dependent    blood glucose monitor strips 100 strip 11     Si strip by Other route 3 times daily (with meals) Test___times daily    Diagnosis: 250.0   Diabetes Mellitus____Insulin Dependent____Non-Insulin Dependent    cyclobenzaprine (FLEXERIL) 5 MG tablet 10 tablet 0     Sig: Take 1 tablet by mouth 2 times daily as needed for Muscle spasms    insulin aspart (NOVOLOG) 100 UNIT/ML injection pen 5 pen 2     Sig: Inject 5 Units into the skin 3 times daily (before meals)    insulin glargine (BASAGLAR KWIKPEN) 100 UNIT/ML injection pen 15 mL 0     Sig: INJECT 15 UNITS INTO THE SKIN NIGHTLY    Insulin Pen Needle (COMFORT EZ PEN NEEDLES) 32G X 6 MM MISC 100 each 3     Sig: USE AS DIRECTED WITH INSULIN PENS (UP TO 3 TIMES DAILY)    Lancets MISC 100 each 11     Si each by Does not apply route daily Use___times daily    Diagnisis:250.0  Diabetes Mellitus____Insulin Dependent___Non-Insulin Dependent    lisinopril (PRINIVIL;ZESTRIL) 20 MG tablet 90 tablet 1     Sig: Take 1 tablet by mouth daily    pantoprazole (PROTONIX) 40 MG tablet 30 tablet 1     Sig: Take 1 tablet by mouth daily Take 30 mins before breakfast daily    ondansetron (ZOFRAN ODT) 4 MG disintegrating tablet 20 tablet 0     Sig: Take 1 tablet by mouth every 8 hours as needed for Nausea or Vomiting       Medications Discontinued During This Encounter   Medication Reason    ondansetron (ZOFRAN ODT) 4 MG disintegrating tablet REORDER    Lancets MISC REORDER    Alcohol Swabs (ALCOHOL PREP) 70 % PADS REORDER    blood glucose monitor strips REORDER    Insulin Pen Needle (COMFORT EZ PEN NEEDLES) 32G X 6 MM MISC REORDER    lisinopril (PRINIVIL;ZESTRIL) 20 MG tablet REORDER    insulin aspart (NOVOLOG) 100 UNIT/ML injection pen REORDER    pantoprazole (PROTONIX) 40 MG tablet REORDER  insulin glargine (BASAGLAR KWIKPEN) 100 UNIT/ML injection pen REORDER    cyclobenzaprine (FLEXERIL) 5 MG tablet REORDER       Vaibhav received counseling on the following healthy behaviors: nutrition, exercise and medication adherence    Discussed use,benefit, and side effects of prescribed medications. Barriers to medication compliance addressed. All patient questions answered. Pt voiced understanding. Return in about 4 weeks (around 3/29/2021) for hba1c check . Disclaimer: Some orall of this note was transcribed using voice-recognition software. This may cause typographical errors occasionally. Although all effort is made to fix these errors, please do not hesitate to contact our office if there [place] concern with the understanding of this note.

## 2021-03-01 NOTE — PROGRESS NOTES
I have reviewed and discussed key elements of [facility] with the resident including plan of care and follow up and agree with the care rima plan.

## 2021-03-08 ENCOUNTER — TELEPHONE (OUTPATIENT)
Dept: FAMILY MEDICINE CLINIC | Age: 31
End: 2021-03-08

## 2021-03-08 NOTE — TELEPHONE ENCOUNTER
Writer placed telephone call to patient to discuss his blood sugars, medications and to see if he had any questions till his next follow up appointment on 03/29/21 but left voicemail message asking for patient to call back with any questions or concerns.

## 2021-03-30 ENCOUNTER — TELEPHONE (OUTPATIENT)
Dept: FAMILY MEDICINE CLINIC | Age: 31
End: 2021-03-30

## 2021-03-30 NOTE — LETTER
[address]  [address] [place] [address]  Phone: [phone]  Fax: [phone]            March 31, 2021     Orlando Martinez   [facility] [address]      Dear Alvino Diaz: We are sorry that you missed your appointment with Dr. Rk Bonilla on 03/29/21 . Your health and follow-up medical care are important to us. Please call our office as soon as possible so that we may reschedule your appointment. If you have already rescheduled your appointment, please disregard this letter.     Sincerely,        Dr. Destinee Braga B.LPN

## 2021-03-30 NOTE — TELEPHONE ENCOUNTER
Writer placed telephone call to patient in attempts to reschedule missed appointment on 03/29/21 with Adeline Hoyos. Voicemail message left to call office and reschedule. Message # 2.

## 2021-03-31 NOTE — TELEPHONE ENCOUNTER
Writer placed telephone call to patient in attempts to reschedule missed appointment on 03/29/21 with Jorge Marroquin. Voicemail message left to call office and reschedule. Message # 3. Letter sent.

## 2021-04-12 DIAGNOSIS — E10.9 TYPE 1 DIABETES MELLITUS WITHOUT COMPLICATION (HCC): ICD-10-CM

## 2021-04-12 RX ORDER — CYCLOBENZAPRINE HCL 5 MG
TABLET ORAL
Qty: 10 TABLET | Refills: 0 | Status: SHIPPED | OUTPATIENT
Start: 2021-04-12 | End: 2021-05-20

## 2021-04-12 NOTE — TELEPHONE ENCOUNTER
Flexeril pending for refill     Health Maintenance   Topic Date Due    Hepatitis C screen  Never done    Pneumococcal 0-64 years Vaccine (1 of 1 - PPSV23) Never done    Diabetic foot exam  Never done    Diabetic retinal exam  Never done    HIV screen  Never done    COVID-19 Vaccine (1) Never done    Lipid screen  05/29/2014    Diabetic microalbuminuria test  01/24/2019    Annual Wellness Visit (AWV)  Never done    Hepatitis B vaccine (1 of 3 - Risk 3-dose series) 06/11/2021 (Originally 5/1/2009)    DTaP/Tdap/Td vaccine (1 - Tdap) 06/11/2021 (Originally 5/1/2009)    A1C test (Diabetic or Prediabetic)  06/01/2021    Flu vaccine (Season Ended) 09/01/2021    Potassium monitoring  02/22/2022    Creatinine monitoring  02/22/2022    Hepatitis A vaccine  Aged Out    Hib vaccine  Aged Out    Meningococcal (ACWY) vaccine  Aged Out    Varicella vaccine  Discontinued             (applicable per patient's age: Cancer Screenings, Depression Screening, Fall Risk Screening, Immunizations)    Hemoglobin A1C (%)   Date Value   03/01/2021 10.4   09/11/2020 11.7   06/11/2020 12.6 (A)     Microalb/Crt. Ratio (mcg/mg creat)   Date Value   01/24/2018 779 (H)     LDL Cholesterol (mg/dL)   Date Value   05/29/2013 109 (H)     AST (U/L)   Date Value   02/22/2021 42 (H)     ALT (U/L)   Date Value   02/22/2021 46 (H)     BUN (mg/dL)   Date Value   02/22/2021 28 (H)      (goal A1C is < 7)   (goal LDL is <100) need 30-50% reduction from baseline     BP Readings from Last 3 Encounters:   03/01/21 137/83   02/22/21 (!) 150/77   02/02/21 (!) 152/90    (goal /80)      All Future Testing planned in [facility]:  Lab Frequency Next Occurrence   HIV Screen Once 05/09/2021   Microalbumin, Ur Once 07/28/2021   Lipid Panel Once 64/82/6840   Basic Metabolic Panel Once 85/53/3338   Basic Metabolic Panel Once 73/39/8132       Next Visit Date:  No future appointments.          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

## 2021-04-15 ENCOUNTER — TELEPHONE (OUTPATIENT)
Dept: FAMILY MEDICINE CLINIC | Age: 31
End: 2021-04-15

## 2021-04-15 NOTE — LETTER
Medication Management Service ([address] [place] [address])  [address] [address]           04/19/21     Dear Kassandra Holcomb,          I recently tried to reach you by telephone to review your medications at the request of Dr. Bronwyn Hernandez at [facility] on [address]. Our team of pharmacists is able to work alongside your providers to help you understand your medications and ensure they are being taken properly. Please contact me at [phone] so that I can review your medications and determine any steps needed to assist you in taking your medications.            Homero Rodas, PharmD  PGY-2 Ambulatory Care Resident Pharmacist  Medication Management Service  [phone]

## 2021-04-15 NOTE — TELEPHONE ENCOUNTER
Medication Management Service  Ana Fernández is a 27 y.o. male who was called by pharmacy for medication review. First attempt made to reach patient by telephone. Left message for patient to return phone call to [phone]. Will continue to attempt to contact patient by telephone as appropriate.       Chelsey Dave, PharmD  PGY-2 Ambulatory Care Resident Pharmacist  Medication Management Service  [phone]  4/15/2021  10:06 AM

## 2021-04-19 NOTE — TELEPHONE ENCOUNTER
Medication Management Service  Ana Gaines is a 27 y.o. male who was called by pharmacy for medication review. Second attempt made to reach patient by telephone. Unable to leave message. Will send letter and discontinue referral if no response in 2 weeks.      Mary Muse PharmD  PGY-2 Ambulatory Care Resident Pharmacist  Medication Management Service  [phone]  4/19/2021  2:18 PM

## 2021-04-22 ENCOUNTER — OFFICE VISIT (OUTPATIENT)
Dept: FAMILY MEDICINE CLINIC | Age: 31
End: 2021-04-22
Payer: MEDICARE

## 2021-04-22 VITALS
SYSTOLIC BLOOD PRESSURE: 140 MMHG | DIASTOLIC BLOOD PRESSURE: 86 MMHG | TEMPERATURE: 96.8 F | BODY MASS INDEX: 26.36 KG/M2 | WEIGHT: 164 LBS | HEART RATE: 87 BPM | HEIGHT: 66 IN

## 2021-04-22 DIAGNOSIS — I10 ESSENTIAL HYPERTENSION: ICD-10-CM

## 2021-04-22 DIAGNOSIS — E10.9 TYPE 1 DIABETES MELLITUS WITHOUT COMPLICATION (HCC): Primary | ICD-10-CM

## 2021-04-22 DIAGNOSIS — Z00.00 HEALTHCARE MAINTENANCE: ICD-10-CM

## 2021-04-22 LAB — HBA1C MFR BLD: 10.2 %

## 2021-04-22 PROCEDURE — 83036 HEMOGLOBIN GLYCOSYLATED A1C: CPT | Performed by: STUDENT IN AN ORGANIZED HEALTH CARE EDUCATION/TRAINING PROGRAM

## 2021-04-22 PROCEDURE — 99213 OFFICE O/P EST LOW 20 MIN: CPT | Performed by: STUDENT IN AN ORGANIZED HEALTH CARE EDUCATION/TRAINING PROGRAM

## 2021-04-22 RX ORDER — FLASH GLUCOSE SENSOR
1 KIT MISCELLANEOUS CONTINUOUS PRN
Qty: 1 EACH | Refills: 0 | Status: SHIPPED | OUTPATIENT
Start: 2021-04-22 | End: 2021-11-15

## 2021-04-22 RX ORDER — ATORVASTATIN CALCIUM 20 MG/1
20 TABLET, FILM COATED ORAL DAILY
Qty: 30 TABLET | Refills: 3 | Status: SHIPPED | OUTPATIENT
Start: 2021-04-22

## 2021-04-22 RX ORDER — LISINOPRIL 30 MG/1
30 TABLET ORAL DAILY
Qty: 30 TABLET | Refills: 1 | Status: SHIPPED | OUTPATIENT
Start: 2021-04-22 | End: 2021-07-01

## 2021-04-22 RX ORDER — FLASH GLUCOSE SENSOR
KIT MISCELLANEOUS
Qty: 2 EACH | Refills: 11 | Status: CANCELLED | OUTPATIENT
Start: 2021-04-22

## 2021-04-22 RX ORDER — FLASH GLUCOSE SCANNING READER
EACH MISCELLANEOUS
Qty: 1 EACH | Refills: 0 | Status: CANCELLED | OUTPATIENT
Start: 2021-04-22

## 2021-04-22 RX ORDER — INSULIN GLARGINE 100 [IU]/ML
20 INJECTION, SOLUTION SUBCUTANEOUS NIGHTLY
Qty: 5 PEN | Refills: 1 | Status: SHIPPED | OUTPATIENT
Start: 2021-04-22 | End: 2021-07-01 | Stop reason: SDUPTHER

## 2021-04-22 RX ORDER — PEN NEEDLE, DIABETIC 32GX 5/32"
NEEDLE, DISPOSABLE MISCELLANEOUS
COMMUNITY
Start: 2021-03-01 | End: 2021-11-15

## 2021-04-22 ASSESSMENT — ENCOUNTER SYMPTOMS
ABDOMINAL PAIN: 0
CHEST TIGHTNESS: 0
SHORTNESS OF BREATH: 0

## 2021-04-22 NOTE — TELEPHONE ENCOUNTER
PEN NEEDLES) 32G X 6 MM MISC USE AS DIRECTED WITH INSULIN PENS (UP TO 3 TIMES DAILY) 100 each 3    Lancets MISC 1 each by Does not apply route daily Use___times daily    Diagnisis:250.0  Diabetes Mellitus____Insulin Dependent___Non-Insulin Dependent 100 each 11    pantoprazole (PROTONIX) 40 MG tablet Take 1 tablet by mouth daily Take 30 mins before breakfast daily 30 tablet 1    ondansetron (ZOFRAN ODT) 4 MG disintegrating tablet Take 1 tablet by mouth every 8 hours as needed for Nausea or Vomiting 20 tablet 0    acetaminophen (TYLENOL) 325 MG tablet Take 2 tablets by mouth every 6 hours as needed for Pain 120 tablet 0    ibuprofen (ADVIL;MOTRIN) 600 MG tablet Take 1 tablet by mouth every 6 hours as needed for Pain 30 tablet 0    Blood Pressure KIT 1 each by Does not apply route as needed (BP check) 1 kit 0    glucose monitoring kit (FREESTYLE) monitoring kit 1 kit by Does not apply route daily 1 kit 0    albuterol sulfate HFA (PROAIR HFA) 108 (90 Base) MCG/ACT inhaler Inhale 2 puffs into the lungs every 4 hours as needed for Wheezing (Patient not taking: Reported on 4/22/2021) 1 Inhaler 0           Diabetes Medications:   Basaglar 15 units daily at night  Novolog 5 units TID with meals --- has been taking 10 units with meals for past 2 weeks. Previous Diabetes Medications: N/A    HTN Medications: Lisinopril 20mg    Hyperlipidemia Medications: N/A - but qualifies, needs lipid panel. On Statin: No    On ACE-I or ARB for HTN or Microalbuminuria: Yes    Medication Adherence/Cost/Adverse Events: None disclosed besides that [facility] has only been giving him 1 pen of Basaglar per month so he runs out sooner than every month. Assessment/Plan     Diabetes Management:   Patient presents with uncontrolled T1DM as evidenced by A1c of 10.4% and fasting BG of 175-200. Pt reports no hypoglycemia. Currently taking Basaglar 15 units nightly and Novolog 5 units TID.   Recommendation made to PCP for increase in Basaglar. Pt is candidate for statin due to age of 27 and Type 1 DM diagnosis per ADA. Also recommended increase in blood pressure management with lisinopril increased dose or addition of amlodipine. Recommended Freestyle Mary CGM system due to patients T1DM diagnosis, he is not reaching glycemic targets and is not checking BG during the day to adjust his bolus doses. [facility] can assist with management of CGM via referral.     Next Follow-Up: Will call to f/u in 1 week regarding BG/Mary. Back with PCP in 1 month. Patient verbalized understanding of care plan. Patient advised to call Medication Management with any questions, concerns, or changes prior to next appointment    Education   The following education was provided during today's visit:     [] Medication adherence   [] Insulin technique and storage   [] Healthy lifestyle including diet and exercise changes   [x] Hypoglycemia symptoms and management   [x] Blood glucose goals     Time Spent: 15 minutes, face-to-face      Miguel Schulz PharmD  PGY-2 Ambulatory Care Resident Pharmacist  Medication Management Service  [phone]  4/22/2021  11:18 AM        For Pharmacy [id number] [place] [address] in place: Yes    Recommendation Provided To: Provider: 4 via Verbally to provider   Intervention Detail: CMR/TIP Completed, Dose Adjustment: 1: reason: Needs Additional Medication Therapy and New Rx: 2, reason: Patient Preference   Gap Closed?  Yes    Total # of Interventions Recommended: 4   Total # of Interventions Accepted: 4   Intervention Accepted By (must total above): Provider: 4   Time Spent (min): 30

## 2021-04-22 NOTE — PROGRESS NOTES
Attending Physician Statement  I  have discussed the care of Dewayne Guerrero including pertinent history and exam findings with the resident. I agree with the assessment, plan and orders as documented by the resident. BP (!) 140/86   Pulse 87   Temp 96.8 °F (36 °C)   Ht 5' 6\" (1.676 m)   Wt 164 lb (74.4 kg)   BMI 26.47 kg/m²    BP Readings from Last 3 Encounters:   04/22/21 (!) 140/86   03/01/21 137/83   02/22/21 (!) 150/77     Wt Readings from Last 3 Encounters:   04/22/21 164 lb (74.4 kg)   03/01/21 169 lb 3.2 oz (76.7 kg)   02/02/21 175 lb (79.4 kg)          Diagnosis Orders   1. Type 1 diabetes mellitus without complication (HCC)  POCT glycosylated hemoglobin (Hb A1C)    atorvastatin (LIPITOR) 20 MG tablet    Lipid Panel    lisinopril (PRINIVIL;ZESTRIL) 30 MG tablet    [facility]) Medication Mgmt (Diabetes - Clinical Pharmacy) - [facility]    insulin glargine [facility]) 100 UNIT/ML injection pen    [place] - Adrianna Johnston MD, Ophthalmology, [facility]    Continuous Blood Gluc Sensor ([address] [place] [address]) MIS    Continuous Blood Gluc Sensor (FREESTYLE KATIE 14 DAY SENSOR) MISC   2. Essential hypertension  Lipid Panel    lisinopril (PRINIVIL;ZESTRIL) 30 MG tablet   3.  Healthcare maintenance  Hepatitis C Antibody       Antonieta Chaidez DO 4/22/2021 11:42 AM

## 2021-04-22 NOTE — PROGRESS NOTES
Effort: Pulmonary effort is normal. No respiratory distress. Breath sounds: Normal breath sounds. No wheezing. Skin:     General: Skin is warm and dry. Neurological:      Mental Status: He is alert. Lab Results   Component Value Date    WBC 4.4 02/22/2021    HGB 12.7 (L) 02/22/2021    HCT 38.3 (L) 02/22/2021     02/22/2021    CHOL 172 05/29/2013    TRIG 58 05/29/2013    HDL 51 05/29/2013    ALT 46 (H) 02/22/2021    AST 42 (H) 02/22/2021     02/22/2021    K 3.5 (L) 02/22/2021     02/22/2021    CREATININE 1.66 (H) 02/22/2021    BUN 28 (H) 02/22/2021    CO2 25 02/22/2021    TSH 0.58 08/14/2013    INR 1.0 03/23/2018    LABA1C 10.4 03/01/2021    LABMICR 779 (H) 01/24/2018     Lab Results   Component Value Date    CALCIUM 8.8 02/22/2021    PHOS 3.2 02/22/2021     Lab Results   Component Value Date    LDLCHOLESTEROL 109 (H) 05/29/2013       Assessment and Plan:    1. Type 1 diabetes mellitus without complication (HCC)  Nightly and AC glucose checks  - POCT glycosylated hemoglobin (Hb A1C)  - atorvastatin (LIPITOR) 20 MG tablet; Take 1 tablet by mouth daily  Dispense: 30 tablet; Refill: 3  - Lipid Panel; Future  - lisinopril (PRINIVIL;ZESTRIL) 30 MG tablet; Take 1 tablet by mouth daily  Dispense: 30 tablet; Refill: 1  - [facility]) Medication Mgmt (Diabetes - Clinical Pharmacy) - [facility]  - insulin glargine [facility]) 100 UNIT/ML injection pen; Inject 20 Units into the skin nightly  Dispense: 5 pen; Refill: 1  - [facility] - Adolph Dasilva MD, Ophthalmology, [facility]  - Continuous Blood Gluc Sensor ([address] [place] [address] Street) [address] [facility]; 1 each by Does not apply route continuous prn (blood glucose checks qHS and AC)  Dispense: 1 each; Refill: 0  - Continuous Blood Gluc Sensor (FREESTYLE KATIE 14 DAY SENSOR) MISC; 1 each by Does not apply route continuous prn (blood glucose checks)  Dispense: 1 each; Refill: 0    2. Essential hypertension  - Lipid Panel;  Future  - lisinopril (PRINIVIL;ZESTRIL) 30 MG tablet; Take 1 tablet by mouth daily  Dispense: 30 tablet; Refill: 1    3. Healthcare maintenance  - Hepatitis C Antibody; Future          Requested Prescriptions     Signed Prescriptions Disp Refills    atorvastatin (LIPITOR) 20 MG tablet 30 tablet 3     Sig: Take 1 tablet by mouth daily    lisinopril (PRINIVIL;ZESTRIL) 30 MG tablet 30 tablet 1     Sig: Take 1 tablet by mouth daily    insulin glargine (BASAGLAR KWIKPEN) 100 UNIT/ML injection pen 5 pen 1     Sig: Inject 20 Units into the skin nightly    Continuous Blood Gluc Sensor (FREESTYLE KATIE SENSOR SYSTEM) MISC 1 each 0     Si each by Does not apply route continuous prn (blood glucose checks qHS and AC)    Continuous Blood Gluc Sensor (FREESTYLE KATIE 14 DAY SENSOR) MISC 1 each 0     Si each by Does not apply route continuous prn (blood glucose checks)       Medications Discontinued During This Encounter   Medication Reason    insulin glargine (BASAGLAR KWIKPEN) 100 UNIT/ML injection pen REORDER    lisinopril (PRINIVIL;ZESTRIL) 20 MG tablet        No follow-ups on file.

## 2021-04-29 ENCOUNTER — TELEPHONE (OUTPATIENT)
Dept: FAMILY MEDICINE CLINIC | Age: 31
End: 2021-04-29

## 2021-04-29 NOTE — TELEPHONE ENCOUNTER
Medication Management Service  Ana Comer is a 27 y.o. male who was called by pharmacy for diabetes management. First attempt made to reach patient by telephone. Left message for patient to return phone call to [phone]. Will continue to attempt to contact patient by telephone as appropriate.       Lauren Cisneros PharmD  PGY-2 Ambulatory Care Resident Pharmacist  Medication Management Service  [phone]  4/29/2021  10:10 AM

## 2021-04-29 NOTE — LETTER
Medication Management Service ([address] [place] [address])  [address] [address]           05/11/21     Dear Jenna Cifuentes,        I recently tried to reach you by telephone to review your blood glucose readings and medications for diabetes management at the request of Dr. Zabrina Santana. I was hoping to discuss your new insulin doses, statin medication and the Freestyle Mary device to check your blood sugar    Keeping your blood sugars at a healthy level has been shown to decrease the risk of diabetes complications. Our team of pharmacists is able to work alongside your providers to help you reach your diabetes goals. Please contact me at [phone] so that I can review your blood sugars and determine the next step in your diabetes treatment plan.         Miguel Schulz, PharmD  PGY-2 Ambulatory Care Resident Pharmacist  Medication Management Service  [phone]

## 2021-05-05 DIAGNOSIS — K21.9 GASTROESOPHAGEAL REFLUX DISEASE WITHOUT ESOPHAGITIS: ICD-10-CM

## 2021-05-05 RX ORDER — PANTOPRAZOLE SODIUM 40 MG/1
40 TABLET, DELAYED RELEASE ORAL DAILY
Qty: 30 TABLET | Refills: 5 | Status: SHIPPED | OUTPATIENT
Start: 2021-05-05 | End: 2021-10-05

## 2021-05-05 NOTE — TELEPHONE ENCOUNTER
90 day supply      Please address the medication refill and close the encounter. If I can be of assistance, please route to the applicable pool. Thank you. Last visit: 4/22/21  Last Med refill: 3-1-21  Does patient have enough medication for 72 hours: No:     Next Visit Date:  Future Appointments   Date Time Provider Ana Sandie   5/24/2021 10:45 AM Anjum Celis MD [facility]   Topic Date Due    Hepatitis C screen  Never done    Pneumococcal 0-64 years Vaccine (1 of 1 - PPSV23) Never done    Diabetic foot exam  Never done    Diabetic retinal exam  Never done    HIV screen  Never done    COVID-19 Vaccine (1) Never done    Lipid screen  05/29/2014    Diabetic microalbuminuria test  01/24/2019    Annual Wellness Visit (AWV)  Never done    Hepatitis B vaccine (1 of 3 - Risk 3-dose series) 06/11/2021 (Originally 5/1/2009)    DTaP/Tdap/Td vaccine (1 - Tdap) 06/11/2021 (Originally 5/1/2009)    A1C test (Diabetic or Prediabetic)  07/22/2021    Flu vaccine (Season Ended) 09/01/2021    Potassium monitoring  02/22/2022    Creatinine monitoring  02/22/2022    Hepatitis A vaccine  Aged Out    Hib vaccine  Aged Out    Meningococcal (ACWY) vaccine  Aged Out    Varicella vaccine  Discontinued       Hemoglobin A1C (%)   Date Value   04/22/2021 10.2   03/01/2021 10.4   09/11/2020 11.7             ( goal A1C is < 7)   Microalb/Crt.  Ratio (mcg/mg creat)   Date Value   01/24/2018 779 (H)     LDL Cholesterol (mg/dL)   Date Value   05/29/2013 109 (H)       (goal LDL is <100)   AST (U/L)   Date Value   02/22/2021 42 (H)     ALT (U/L)   Date Value   02/22/2021 46 (H)     BUN (mg/dL)   Date Value   02/22/2021 28 (H)     BP Readings from Last 3 Encounters:   04/22/21 (!) 140/86   03/01/21 137/83   02/22/21 (!) 150/77          (goal 120/80)    All Future Testing planned in [facility]  Lab Frequency Next Occurrence   HIV Screen Once 05/09/2021   Microalbumin, Ur Once 07/28/2021 Basic Metabolic Panel Once 20/18/3846   Basic Metabolic Panel Once 17/80/5464   Lipid Panel Once 05/22/2021   Hepatitis C Antibody Once 04/22/2022               Patient Active Problem List:     Type 1 diabetes mellitus without complication ([facility] [address]     Diabetic ketoacidosis without coma ([facility] [address]     Smoking     Hypomagnesemia     HLD (hyperlipidemia)     Ketosis due to diabetes (HCC)     Nausea     McArdle disease ([facility] [address]     Gastroenteritis     Muscle spasm     Essential hypertension     Gastroesophageal reflux disease without esophagitis

## 2021-05-11 NOTE — TELEPHONE ENCOUNTER
Medication Management Service  Ana Mcgarry is a 32 y.o. male who was called by pharmacy for diabetes management. Second attempt made to reach patient by telephone. Left message for patient to return phone call to [phone]. Will continue to attempt to contact patient by telephone as appropriate. Will send letter and discontinue referral if no response in 2 weeks.      Bebo Mckeon PharmD  PGY-2 Ambulatory Care Resident Pharmacist  Medication Management Service  [phone]  5/11/2021  3:49 PM

## 2021-05-20 DIAGNOSIS — E10.9 TYPE 1 DIABETES MELLITUS WITHOUT COMPLICATION (HCC): ICD-10-CM

## 2021-05-20 RX ORDER — CYCLOBENZAPRINE HCL 5 MG
TABLET ORAL
Qty: 10 TABLET | Refills: 0 | Status: SHIPPED | OUTPATIENT
Start: 2021-05-20 | End: 2021-06-14

## 2021-05-20 NOTE — TELEPHONE ENCOUNTER
E-scribe request for cyclobenzaprine. Please review and e-scribe if applicable. Last Visit Date: 4/22/2021  Next Visit Date:  5/24/2021    Hemoglobin A1C (%)   Date Value   04/22/2021 10.2   03/01/2021 10.4   09/11/2020 11.7             ( goal A1C is < 7)   Microalb/Crt.  Ratio (mcg/mg creat)   Date Value   01/24/2018 779 (H)     LDL Cholesterol (mg/dL)   Date Value   05/29/2013 109 (H)       (goal LDL is <100)   AST (U/L)   Date Value   02/22/2021 42 (H)     ALT (U/L)   Date Value   02/22/2021 46 (H)     BUN (mg/dL)   Date Value   02/22/2021 28 (H)     BP Readings from Last 3 Encounters:   04/22/21 (!) 140/86   03/01/21 137/83   02/22/21 (!) 150/77          (goal 120/80)        Patient Active Problem List:     Type 1 diabetes mellitus without complication (HCC)     Diabetic ketoacidosis without coma (HCC)     Smoking     Hypomagnesemia     HLD (hyperlipidemia)     Ketosis due to diabetes (HCC)     Nausea     McArdle disease ([facility] [address]     Gastroenteritis     Muscle spasm     Essential hypertension     Gastroesophageal reflux disease without esophagitis

## 2021-05-23 ENCOUNTER — APPOINTMENT (OUTPATIENT)
Dept: GENERAL RADIOLOGY | Age: 31
End: 2021-05-23
Payer: MEDICARE

## 2021-05-23 ENCOUNTER — HOSPITAL ENCOUNTER (EMERGENCY)
Age: 31
Discharge: HOME | End: 2021-05-23
Attending: EMERGENCY MEDICINE
Payer: MEDICARE

## 2021-05-23 ENCOUNTER — APPOINTMENT (OUTPATIENT)
Dept: CT IMAGING | Age: 31
End: 2021-05-23
Payer: MEDICARE

## 2021-05-23 VITALS
RESPIRATION RATE: 18 BRPM | HEIGHT: 66 IN | SYSTOLIC BLOOD PRESSURE: 141 MMHG | OXYGEN SATURATION: 93 % | WEIGHT: 180 LBS | TEMPERATURE: 97.9 F | HEART RATE: 78 BPM | BODY MASS INDEX: 28.93 KG/M2 | DIASTOLIC BLOOD PRESSURE: 88 MMHG

## 2021-05-23 DIAGNOSIS — R10.9 ABDOMINAL PAIN, UNSPECIFIED ABDOMINAL LOCATION: ICD-10-CM

## 2021-05-23 DIAGNOSIS — R11.2 NON-INTRACTABLE VOMITING WITH NAUSEA, UNSPECIFIED VOMITING TYPE: Primary | ICD-10-CM

## 2021-05-23 LAB
-: ABNORMAL
ABSOLUTE EOS #: 0.05 K/UL (ref 0–0.44)
ABSOLUTE IMMATURE GRANULOCYTE: 0.03 K/UL (ref 0–0.3)
ABSOLUTE LYMPH #: 1.51 K/UL (ref 1.1–3.7)
ABSOLUTE MONO #: 0.51 K/UL (ref 0.1–1.2)
ALBUMIN SERPL-MCNC: 3.7 G/DL (ref 3.5–5.2)
ALBUMIN/GLOBULIN RATIO: 1.3 (ref 1–2.5)
ALP BLD-CCNC: 113 U/L (ref 40–129)
ALT SERPL-CCNC: 35 U/L (ref 5–41)
AMORPHOUS: ABNORMAL
ANION GAP SERPL CALCULATED.3IONS-SCNC: 11 MMOL/L (ref 9–17)
AST SERPL-CCNC: 26 U/L
BACTERIA: ABNORMAL
BASOPHILS # BLD: 1 % (ref 0–2)
BASOPHILS ABSOLUTE: 0.05 K/UL (ref 0–0.2)
BETA-HYDROXYBUTYRATE: 0.86 MMOL/L (ref 0.02–0.27)
BILIRUB SERPL-MCNC: 0.4 MG/DL (ref 0.3–1.2)
BILIRUBIN DIRECT: 0.09 MG/DL
BILIRUBIN URINE: NEGATIVE
BILIRUBIN, INDIRECT: 0.31 MG/DL (ref 0–1)
BUN BLDV-MCNC: 22 MG/DL (ref 6–20)
BUN/CREAT BLD: ABNORMAL (ref 9–20)
CALCIUM SERPL-MCNC: 8.7 MG/DL (ref 8.6–10.4)
CASTS UA: ABNORMAL /LPF (ref 0–8)
CHLORIDE BLD-SCNC: 98 MMOL/L (ref 98–107)
CO2: 26 MMOL/L (ref 20–31)
COLOR: YELLOW
CREAT SERPL-MCNC: 1.39 MG/DL (ref 0.7–1.2)
CRYSTALS, UA: ABNORMAL /HPF
DIFFERENTIAL TYPE: ABNORMAL
EOSINOPHILS RELATIVE PERCENT: 1 % (ref 1–4)
EPITHELIAL CELLS UA: ABNORMAL /HPF (ref 0–5)
GFR AFRICAN AMERICAN: >60 ML/MIN
GFR NON-AFRICAN AMERICAN: 60 ML/MIN
GFR SERPL CREATININE-BSD FRML MDRD: ABNORMAL ML/MIN/{1.73_M2}
GFR SERPL CREATININE-BSD FRML MDRD: ABNORMAL ML/MIN/{1.73_M2}
GLOBULIN: NORMAL G/DL (ref 1.5–3.8)
GLUCOSE BLD-MCNC: 298 MG/DL
GLUCOSE BLD-MCNC: 298 MG/DL (ref 75–110)
GLUCOSE BLD-MCNC: 339 MG/DL
GLUCOSE BLD-MCNC: 339 MG/DL (ref 75–110)
GLUCOSE BLD-MCNC: 373 MG/DL (ref 70–99)
GLUCOSE URINE: ABNORMAL
HCT VFR BLD CALC: 40.1 % (ref 40.7–50.3)
HEMOGLOBIN: 13.6 G/DL (ref 13–17)
IMMATURE GRANULOCYTES: 0 %
KETONES, URINE: ABNORMAL
LEUKOCYTE ESTERASE, URINE: NEGATIVE
LIPASE: 11 U/L (ref 13–60)
LYMPHOCYTES # BLD: 17 % (ref 24–43)
MCH RBC QN AUTO: 32.5 PG (ref 25.2–33.5)
MCHC RBC AUTO-ENTMCNC: 33.9 G/DL (ref 28.4–34.8)
MCV RBC AUTO: 95.7 FL (ref 82.6–102.9)
MONOCYTES # BLD: 6 % (ref 3–12)
MUCUS: ABNORMAL
NITRITE, URINE: NEGATIVE
NRBC AUTOMATED: 0 PER 100 WBC
OTHER OBSERVATIONS UA: ABNORMAL
PDW BLD-RTO: 13 % (ref 11.8–14.4)
PH UA: 6.5 (ref 5–8)
PLATELET # BLD: 247 K/UL (ref 138–453)
PLATELET ESTIMATE: ABNORMAL
PMV BLD AUTO: 9.1 FL (ref 8.1–13.5)
POTASSIUM SERPL-SCNC: 4 MMOL/L (ref 3.7–5.3)
PROTEIN UA: ABNORMAL
RBC # BLD: 4.19 M/UL (ref 4.21–5.77)
RBC # BLD: ABNORMAL 10*6/UL
RBC UA: ABNORMAL /HPF (ref 0–4)
RENAL EPITHELIAL, UA: ABNORMAL /HPF
SEG NEUTROPHILS: 75 % (ref 36–65)
SEGMENTED NEUTROPHILS ABSOLUTE COUNT: 6.69 K/UL (ref 1.5–8.1)
SODIUM BLD-SCNC: 135 MMOL/L (ref 135–144)
SPECIFIC GRAVITY UA: 1.04 (ref 1–1.03)
TOTAL PROTEIN: 6.6 G/DL (ref 6.4–8.3)
TRICHOMONAS: ABNORMAL
TURBIDITY: CLEAR
URINE HGB: ABNORMAL
UROBILINOGEN, URINE: NORMAL
WBC # BLD: 8.8 K/UL (ref 3.5–11.3)
WBC # BLD: ABNORMAL 10*3/UL
WBC UA: ABNORMAL /HPF (ref 0–5)
YEAST: ABNORMAL

## 2021-05-23 PROCEDURE — 71045 X-RAY EXAM CHEST 1 VIEW: CPT

## 2021-05-23 PROCEDURE — 74176 CT ABD & PELVIS W/O CONTRAST: CPT

## 2021-05-23 PROCEDURE — 82947 ASSAY GLUCOSE BLOOD QUANT: CPT

## 2021-05-23 PROCEDURE — 80048 BASIC METABOLIC PNL TOTAL CA: CPT

## 2021-05-23 PROCEDURE — 96374 THER/PROPH/DIAG INJ IV PUSH: CPT

## 2021-05-23 PROCEDURE — 83690 ASSAY OF LIPASE: CPT

## 2021-05-23 PROCEDURE — 85025 COMPLETE CBC W/AUTO DIFF WBC: CPT

## 2021-05-23 PROCEDURE — 80076 HEPATIC FUNCTION PANEL: CPT

## 2021-05-23 PROCEDURE — 96375 TX/PRO/DX INJ NEW DRUG ADDON: CPT

## 2021-05-23 PROCEDURE — 6370000000 HC RX 637 (ALT 250 FOR IP): Performed by: GENERAL PRACTICE

## 2021-05-23 PROCEDURE — 2580000003 HC RX 258: Performed by: GENERAL PRACTICE

## 2021-05-23 PROCEDURE — 81001 URINALYSIS AUTO W/SCOPE: CPT

## 2021-05-23 PROCEDURE — 6360000002 HC RX W HCPCS: Performed by: GENERAL PRACTICE

## 2021-05-23 PROCEDURE — 99285 EMERGENCY DEPT VISIT HI MDM: CPT

## 2021-05-23 PROCEDURE — 82010 KETONE BODYS QUAN: CPT

## 2021-05-23 RX ORDER — ONDANSETRON 4 MG/1
4 TABLET, ORALLY DISINTEGRATING ORAL EVERY 8 HOURS PRN
Qty: 20 TABLET | Refills: 0 | Status: SHIPPED | OUTPATIENT
Start: 2021-05-23 | End: 2021-05-26 | Stop reason: SDUPTHER

## 2021-05-23 RX ORDER — ONDANSETRON 2 MG/ML
4 INJECTION INTRAMUSCULAR; INTRAVENOUS ONCE
Status: COMPLETED | OUTPATIENT
Start: 2021-05-23 | End: 2021-05-23

## 2021-05-23 RX ORDER — BENZONATATE 100 MG/1
100 CAPSULE ORAL ONCE
Status: COMPLETED | OUTPATIENT
Start: 2021-05-23 | End: 2021-05-23

## 2021-05-23 RX ORDER — FENTANYL CITRATE 50 UG/ML
25 INJECTION, SOLUTION INTRAMUSCULAR; INTRAVENOUS ONCE
Status: COMPLETED | OUTPATIENT
Start: 2021-05-23 | End: 2021-05-23

## 2021-05-23 RX ORDER — 0.9 % SODIUM CHLORIDE 0.9 %
1000 INTRAVENOUS SOLUTION INTRAVENOUS ONCE
Status: COMPLETED | OUTPATIENT
Start: 2021-05-23 | End: 2021-05-23

## 2021-05-23 RX ORDER — POLYETHYLENE GLYCOL 3350 17 G/17G
17 POWDER, FOR SOLUTION ORAL DAILY PRN
Qty: 30 EACH | Refills: 0 | Status: SHIPPED | OUTPATIENT
Start: 2021-05-23 | End: 2021-06-22

## 2021-05-23 RX ADMIN — BENZONATATE 100 MG: 100 CAPSULE ORAL at 21:16

## 2021-05-23 RX ADMIN — ONDANSETRON 4 MG: 2 INJECTION INTRAMUSCULAR; INTRAVENOUS at 21:16

## 2021-05-23 RX ADMIN — FENTANYL CITRATE 25 MCG: 50 INJECTION, SOLUTION INTRAMUSCULAR; INTRAVENOUS at 21:16

## 2021-05-23 RX ADMIN — SODIUM CHLORIDE 1000 ML: 9 INJECTION, SOLUTION INTRAVENOUS at 19:35

## 2021-05-23 ASSESSMENT — ENCOUNTER SYMPTOMS
BACK PAIN: 0
VOMITING: 1
COUGH: 1
ABDOMINAL PAIN: 1
SHORTNESS OF BREATH: 0
DIARRHEA: 0
NAUSEA: 1

## 2021-05-23 ASSESSMENT — PAIN DESCRIPTION - PAIN TYPE: TYPE: ACUTE PAIN

## 2021-05-23 ASSESSMENT — PAIN SCALES - GENERAL: PAINLEVEL_OUTOF10: 9

## 2021-05-23 NOTE — ED PROVIDER NOTES
activity: Yes     Partners: Female   Other Topics Concern    Not on file   Social History Narrative    Not on file     Social Determinants of Health     Financial Resource Strain:     Difficulty of Paying Living Expenses:    Food Insecurity:     Worried About Running Out of Food in the Last Year:     [address] in the Last Year:    Transportation Needs:     Lack of Transportation (Medical):  Lack of Transportation (Non-Medical):    Physical Activity:     Days of Exercise per Week:     Minutes of Exercise per Session:    Stress:     Feeling of Stress :    Social Connections:     Frequency of Communication with Friends and Family:     Frequency of Social Gatherings with Friends and Family:     Attends [employer] Services:     Active Member of Clubs or Organizations:     Attends Club or Organization Meetings:     Marital Status:    Intimate Partner Violence:     Fear of Current or Ex-Partner:     Emotionally Abused:     Physically Abused:     Sexually Abused:        Family History   Problem Relation Age of Onset    Diabetes Father     Other Father         PAD       Allergies:  Patient has no known allergies. Home Medications:  Prior to Admission medications    Medication Sig Start Date End Date Taking?  Authorizing Provider   cyclobenzaprine (FLEXERIL) 5 MG tablet take 1 tablet by mouth twice a day if needed for muscle spasm 5/20/21   Komal Sheppard MD   pantoprazole (PROTONIX) 40 MG tablet Take 1 tablet by mouth daily Take 30 mins before breakfast daily 5/5/21   Leticia Vela MD   BD PEN NEEDLE FESTUS 2ND GEN 32G X 4 MM MISC use three times a day 3/1/21   Historical Provider, MD   atorvastatin (LIPITOR) 20 MG tablet Take 1 tablet by mouth daily 4/22/21   Noelle Chase MD   lisinopril (PRINIVIL;ZESTRIL) 30 MG tablet Take 1 tablet by mouth daily 4/22/21   Noelle Chase MD   insulin glargine [facility]) 100 UNIT/ML injection pen Inject 20 Units into the skin nightly 4/22/21   Sunshine Matthews deficit. Motor: No weakness. Psychiatric:         Mood and Affect: Mood normal. Mood is not anxious or depressed.          DIFFERENTIAL  DIAGNOSIS     PLAN (LABS / IMAGING / EKG):  Orders Placed This Encounter   Procedures    CT ABDOMEN PELVIS WO CONTRAST Additional Contrast? None    XR CHEST PORTABLE    Hepatic Function Panel    CBC Auto Differential    Basic Metabolic Panel    Lipase    Urinalysis with Microscopic    BETA-HYDROXYBUTYRATE    POCT Glucose    POC Glucose Fingerstick    POCT Glucose    POC Glucose Fingerstick       MEDICATIONS ORDERED:  Orders Placed This Encounter   Medications    0.9 % sodium chloride bolus    benzonatate (TESSALON) capsule 100 mg    ondansetron (ZOFRAN) injection 4 mg    fentaNYL (SUBLIMAZE) injection 25 mcg       DDX: Gastritis, DKA, peptic ulcer disease, kidney stone    DIAGNOSTIC RESULTS / EMERGENCY DEPARTMENT COURSE / MDM   :  Results for orders placed or performed during the hospital encounter of 05/23/21   Hepatic Function Panel   Result Value Ref Range    Albumin 3.7 3.5 - 5.2 g/dL    Alkaline Phosphatase 113 40 - 129 U/L    ALT 35 5 - 41 U/L    AST 26 <40 U/L    Total Bilirubin 0.40 0.3 - 1.2 mg/dL    Bilirubin, Direct 0.09 <0.31 mg/dL    Bilirubin, Indirect 0.31 0.00 - 1.00 mg/dL    Total Protein 6.6 6.4 - 8.3 g/dL    Globulin NOT REPORTED 1.5 - 3.8 g/dL    Albumin/Globulin Ratio 1.3 1.0 - 2.5   CBC Auto Differential   Result Value Ref Range    WBC 8.8 3.5 - 11.3 k/uL    RBC 4.19 (L) 4.21 - 5.77 m/uL    Hemoglobin 13.6 13.0 - 17.0 g/dL    Hematocrit 40.1 (L) 40.7 - 50.3 %    MCV 95.7 82.6 - 102.9 fL    MCH 32.5 25.2 - 33.5 pg    MCHC 33.9 28.4 - 34.8 g/dL    RDW 13.0 11.8 - 14.4 %    Platelets 604 581 - 304 k/uL    MPV 9.1 8.1 - 13.5 fL    NRBC Automated 0.0 0.0 per 100 WBC    Differential Type NOT REPORTED     Seg Neutrophils 75 (H) 36 - 65 %    Lymphocytes 17 (L) 24 - 43 %    Monocytes 6 3 - 12 %    Eosinophils % 1 1 - 4 %    Basophils 1 0 - 2 % Immature Granulocytes 0 0 %    Segs Absolute 6.69 1.50 - 8.10 k/uL    Absolute Lymph # 1.51 1.10 - 3.70 k/uL    Absolute Mono # 0.51 0.10 - 1.20 k/uL    Absolute Eos # 0.05 0.00 - 0.44 k/uL    Basophils Absolute 0.05 0.00 - 0.20 k/uL    Absolute Immature Granulocyte 0.03 0.00 - 0.30 k/uL    WBC Morphology NOT REPORTED     RBC Morphology NOT REPORTED     Platelet Estimate NOT REPORTED    Basic Metabolic Panel   Result Value Ref Range    Glucose 373 (H) 70 - 99 mg/dL    BUN 22 (H) 6 - 20 mg/dL    CREATININE 1.39 (H) 0.70 - 1.20 mg/dL    Bun/Cre Ratio NOT REPORTED 9 - 20    Calcium 8.7 8.6 - 10.4 mg/dL    Sodium 135 135 - 144 mmol/L    Potassium 4.0 3.7 - 5.3 mmol/L    Chloride 98 98 - 107 mmol/L    CO2 26 20 - 31 mmol/L    Anion Gap 11 9 - 17 mmol/L    GFR Non-African American 60 (L) >60 mL/min    GFR African American >60 >60 mL/min    GFR Comment          GFR Staging NOT REPORTED    Lipase   Result Value Ref Range    Lipase 11 (L) 13 - 60 U/L   Urinalysis with Microscopic   Result Value Ref Range    Color, UA YELLOW YELLOW    Turbidity UA CLEAR CLEAR    Glucose, Ur 3+ (A) NEGATIVE    Bilirubin Urine NEGATIVE NEGATIVE    Ketones, Urine SMALL (A) NEGATIVE    Specific Gravity, UA 1.038 (H) 1.005 - 1.030    Urine Hgb MODERATE (A) NEGATIVE    pH, UA 6.5 5.0 - 8.0    Protein, UA 3+ (A) NEGATIVE    Urobilinogen, Urine Normal Normal    Nitrite, Urine NEGATIVE NEGATIVE    Leukocyte Esterase, Urine NEGATIVE NEGATIVE    -          WBC, UA None 0 - 5 /HPF    RBC, UA 10 TO 20 0 - 4 /HPF    Casts UA NOT REPORTED 0 - 8 /LPF    Crystals, UA NOT REPORTED None /HPF    Epithelial Cells UA 0 TO 2 0 - 5 /HPF    Renal Epithelial, UA NOT REPORTED 0 /HPF    Bacteria, UA NOT REPORTED None    Mucus, UA NOT REPORTED None    Trichomonas, UA NOT REPORTED None    Amorphous, UA NOT REPORTED None    Other Observations UA NOT REPORTED NOT REQ.     Yeast, UA NOT REPORTED None   BETA-HYDROXYBUTYRATE   Result Value Ref Range    Beta-Hydroxybutyrate 0.86

## 2021-05-24 ENCOUNTER — APPOINTMENT (OUTPATIENT)
Dept: GENERAL RADIOLOGY | Age: 31
End: 2021-05-24
Payer: MEDICARE

## 2021-05-24 ENCOUNTER — APPOINTMENT (OUTPATIENT)
Dept: CT IMAGING | Age: 31
End: 2021-05-24
Payer: MEDICARE

## 2021-05-24 ENCOUNTER — HOSPITAL ENCOUNTER (OUTPATIENT)
Age: 31
Setting detail: OBSERVATION
Discharge: HOME | End: 2021-05-25
Attending: EMERGENCY MEDICINE | Admitting: EMERGENCY MEDICINE
Payer: MEDICARE

## 2021-05-24 DIAGNOSIS — R06.82 TACHYPNEA: ICD-10-CM

## 2021-05-24 DIAGNOSIS — R09.02 HYPOXEMIA: Primary | ICD-10-CM

## 2021-05-24 LAB
ABSOLUTE EOS #: 0.07 K/UL (ref 0–0.44)
ABSOLUTE IMMATURE GRANULOCYTE: 0.04 K/UL (ref 0–0.3)
ABSOLUTE LYMPH #: 1.08 K/UL (ref 1.1–3.7)
ABSOLUTE MONO #: 0.56 K/UL (ref 0.1–1.2)
ALBUMIN SERPL-MCNC: 3.5 G/DL (ref 3.5–5.2)
ALBUMIN/GLOBULIN RATIO: 1.2 (ref 1–2.5)
ALP BLD-CCNC: 113 U/L (ref 40–129)
ALT SERPL-CCNC: 33 U/L (ref 5–41)
ANION GAP SERPL CALCULATED.3IONS-SCNC: 12 MMOL/L (ref 9–17)
AST SERPL-CCNC: 28 U/L
BASOPHILS # BLD: 0 % (ref 0–2)
BASOPHILS ABSOLUTE: 0.04 K/UL (ref 0–0.2)
BILIRUB SERPL-MCNC: 0.4 MG/DL (ref 0.3–1.2)
BUN BLDV-MCNC: 19 MG/DL (ref 6–20)
BUN/CREAT BLD: ABNORMAL (ref 9–20)
CALCIUM SERPL-MCNC: 8.7 MG/DL (ref 8.6–10.4)
CHLORIDE BLD-SCNC: 97 MMOL/L (ref 98–107)
CO2: 24 MMOL/L (ref 20–31)
CREAT SERPL-MCNC: 1.27 MG/DL (ref 0.7–1.2)
DIFFERENTIAL TYPE: ABNORMAL
EOSINOPHILS RELATIVE PERCENT: 1 % (ref 1–4)
GFR AFRICAN AMERICAN: >60 ML/MIN
GFR NON-AFRICAN AMERICAN: >60 ML/MIN
GFR SERPL CREATININE-BSD FRML MDRD: ABNORMAL ML/MIN/{1.73_M2}
GFR SERPL CREATININE-BSD FRML MDRD: ABNORMAL ML/MIN/{1.73_M2}
GLUCOSE BLD-MCNC: 311 MG/DL (ref 70–99)
HCT VFR BLD CALC: 40.3 % (ref 40.7–50.3)
HEMOGLOBIN: 13.3 G/DL (ref 13–17)
IMMATURE GRANULOCYTES: 0 %
LYMPHOCYTES # BLD: 9 % (ref 24–43)
MCH RBC QN AUTO: 31.7 PG (ref 25.2–33.5)
MCHC RBC AUTO-ENTMCNC: 33 G/DL (ref 28.4–34.8)
MCV RBC AUTO: 96.2 FL (ref 82.6–102.9)
MONOCYTES # BLD: 5 % (ref 3–12)
MYOGLOBIN: 145 NG/ML (ref 28–72)
NRBC AUTOMATED: 0 PER 100 WBC
PDW BLD-RTO: 13 % (ref 11.8–14.4)
PLATELET # BLD: 241 K/UL (ref 138–453)
PLATELET ESTIMATE: ABNORMAL
PMV BLD AUTO: 8.9 FL (ref 8.1–13.5)
POTASSIUM SERPL-SCNC: 4 MMOL/L (ref 3.7–5.3)
RBC # BLD: 4.19 M/UL (ref 4.21–5.77)
RBC # BLD: ABNORMAL 10*6/UL
SARS-COV-2, RAPID: NOT DETECTED
SEG NEUTROPHILS: 85 % (ref 36–65)
SEGMENTED NEUTROPHILS ABSOLUTE COUNT: 10.58 K/UL (ref 1.5–8.1)
SODIUM BLD-SCNC: 133 MMOL/L (ref 135–144)
SPECIMEN DESCRIPTION: NORMAL
TOTAL CK: 1279 U/L (ref 39–308)
TOTAL PROTEIN: 6.4 G/DL (ref 6.4–8.3)
WBC # BLD: 12.4 K/UL (ref 3.5–11.3)
WBC # BLD: ABNORMAL 10*3/UL

## 2021-05-24 PROCEDURE — 2580000003 HC RX 258: Performed by: STUDENT IN AN ORGANIZED HEALTH CARE EDUCATION/TRAINING PROGRAM

## 2021-05-24 PROCEDURE — 2500000003 HC RX 250 WO HCPCS: Performed by: STUDENT IN AN ORGANIZED HEALTH CARE EDUCATION/TRAINING PROGRAM

## 2021-05-24 PROCEDURE — 6360000004 HC RX CONTRAST MEDICATION: Performed by: STUDENT IN AN ORGANIZED HEALTH CARE EDUCATION/TRAINING PROGRAM

## 2021-05-24 PROCEDURE — G0378 HOSPITAL OBSERVATION PER HR: HCPCS

## 2021-05-24 PROCEDURE — 96375 TX/PRO/DX INJ NEW DRUG ADDON: CPT

## 2021-05-24 PROCEDURE — 83874 ASSAY OF MYOGLOBIN: CPT

## 2021-05-24 PROCEDURE — 6360000002 HC RX W HCPCS: Performed by: STUDENT IN AN ORGANIZED HEALTH CARE EDUCATION/TRAINING PROGRAM

## 2021-05-24 PROCEDURE — 96374 THER/PROPH/DIAG INJ IV PUSH: CPT

## 2021-05-24 PROCEDURE — 85025 COMPLETE CBC W/AUTO DIFF WBC: CPT

## 2021-05-24 PROCEDURE — 99285 EMERGENCY DEPT VISIT HI MDM: CPT

## 2021-05-24 PROCEDURE — 87635 SARS-COV-2 COVID-19 AMP PRB: CPT

## 2021-05-24 PROCEDURE — 80053 COMPREHEN METABOLIC PANEL: CPT

## 2021-05-24 PROCEDURE — 82550 ASSAY OF CK (CPK): CPT

## 2021-05-24 PROCEDURE — 93005 ELECTROCARDIOGRAM TRACING: CPT | Performed by: STUDENT IN AN ORGANIZED HEALTH CARE EDUCATION/TRAINING PROGRAM

## 2021-05-24 PROCEDURE — 71045 X-RAY EXAM CHEST 1 VIEW: CPT

## 2021-05-24 PROCEDURE — 71260 CT THORAX DX C+: CPT

## 2021-05-24 PROCEDURE — 96372 THER/PROPH/DIAG INJ SC/IM: CPT

## 2021-05-24 RX ORDER — SODIUM CHLORIDE, SODIUM LACTATE, POTASSIUM CHLORIDE, CALCIUM CHLORIDE 600; 310; 30; 20 MG/100ML; MG/100ML; MG/100ML; MG/100ML
1000 INJECTION, SOLUTION INTRAVENOUS ONCE
Status: COMPLETED | OUTPATIENT
Start: 2021-05-24 | End: 2021-05-24

## 2021-05-24 RX ORDER — ACETAMINOPHEN 325 MG/1
650 TABLET ORAL EVERY 4 HOURS PRN
Status: DISCONTINUED | OUTPATIENT
Start: 2021-05-24 | End: 2021-05-25 | Stop reason: HOSPADM

## 2021-05-24 RX ORDER — PROMETHAZINE HYDROCHLORIDE 25 MG/ML
25 INJECTION, SOLUTION INTRAMUSCULAR; INTRAVENOUS ONCE
Status: COMPLETED | OUTPATIENT
Start: 2021-05-24 | End: 2021-05-24

## 2021-05-24 RX ORDER — NICOTINE POLACRILEX 4 MG
15 LOZENGE BUCCAL PRN
Status: DISCONTINUED | OUTPATIENT
Start: 2021-05-24 | End: 2021-05-25 | Stop reason: HOSPADM

## 2021-05-24 RX ORDER — DEXTROSE MONOHYDRATE 50 MG/ML
100 INJECTION, SOLUTION INTRAVENOUS PRN
Status: DISCONTINUED | OUTPATIENT
Start: 2021-05-24 | End: 2021-05-25 | Stop reason: HOSPADM

## 2021-05-24 RX ORDER — ATORVASTATIN CALCIUM 20 MG/1
20 TABLET, FILM COATED ORAL DAILY
Status: DISCONTINUED | OUTPATIENT
Start: 2021-05-25 | End: 2021-05-25 | Stop reason: HOSPADM

## 2021-05-24 RX ORDER — LISINOPRIL 10 MG/1
30 TABLET ORAL DAILY
Status: DISCONTINUED | OUTPATIENT
Start: 2021-05-25 | End: 2021-05-25 | Stop reason: HOSPADM

## 2021-05-24 RX ORDER — KETOROLAC TROMETHAMINE 15 MG/ML
15 INJECTION, SOLUTION INTRAMUSCULAR; INTRAVENOUS ONCE
Status: COMPLETED | OUTPATIENT
Start: 2021-05-24 | End: 2021-05-24

## 2021-05-24 RX ORDER — INSULIN GLARGINE 100 [IU]/ML
20 INJECTION, SOLUTION SUBCUTANEOUS NIGHTLY
Status: DISCONTINUED | OUTPATIENT
Start: 2021-05-24 | End: 2021-05-25 | Stop reason: HOSPADM

## 2021-05-24 RX ORDER — SODIUM CHLORIDE 9 MG/ML
25 INJECTION, SOLUTION INTRAVENOUS PRN
Status: DISCONTINUED | OUTPATIENT
Start: 2021-05-24 | End: 2021-05-25 | Stop reason: HOSPADM

## 2021-05-24 RX ORDER — PROMETHAZINE HYDROCHLORIDE 25 MG/ML
12.5 INJECTION, SOLUTION INTRAMUSCULAR; INTRAVENOUS EVERY 6 HOURS PRN
Status: DISCONTINUED | OUTPATIENT
Start: 2021-05-24 | End: 2021-05-25 | Stop reason: HOSPADM

## 2021-05-24 RX ORDER — ONDANSETRON 2 MG/ML
4 INJECTION INTRAMUSCULAR; INTRAVENOUS ONCE
Status: COMPLETED | OUTPATIENT
Start: 2021-05-24 | End: 2021-05-24

## 2021-05-24 RX ORDER — DEXTROSE MONOHYDRATE 25 G/50ML
12.5 INJECTION, SOLUTION INTRAVENOUS PRN
Status: DISCONTINUED | OUTPATIENT
Start: 2021-05-24 | End: 2021-05-25 | Stop reason: HOSPADM

## 2021-05-24 RX ORDER — SODIUM CHLORIDE 0.9 % (FLUSH) 0.9 %
5-40 SYRINGE (ML) INJECTION PRN
Status: DISCONTINUED | OUTPATIENT
Start: 2021-05-24 | End: 2021-05-25 | Stop reason: HOSPADM

## 2021-05-24 RX ORDER — SODIUM CHLORIDE 0.9 % (FLUSH) 0.9 %
5-40 SYRINGE (ML) INJECTION EVERY 12 HOURS SCHEDULED
Status: DISCONTINUED | OUTPATIENT
Start: 2021-05-24 | End: 2021-05-25 | Stop reason: HOSPADM

## 2021-05-24 RX ORDER — PANTOPRAZOLE SODIUM 40 MG/1
40 TABLET, DELAYED RELEASE ORAL DAILY
Status: DISCONTINUED | OUTPATIENT
Start: 2021-05-25 | End: 2021-05-25 | Stop reason: HOSPADM

## 2021-05-24 RX ADMIN — PROMETHAZINE HYDROCHLORIDE 25 MG: 25 INJECTION INTRAMUSCULAR; INTRAVENOUS at 20:31

## 2021-05-24 RX ADMIN — FAMOTIDINE 20 MG: 10 INJECTION, SOLUTION INTRAVENOUS at 17:31

## 2021-05-24 RX ADMIN — IOPAMIDOL 75 ML: 755 INJECTION, SOLUTION INTRAVENOUS at 20:12

## 2021-05-24 RX ADMIN — ONDANSETRON 4 MG: 2 INJECTION INTRAMUSCULAR; INTRAVENOUS at 17:31

## 2021-05-24 RX ADMIN — KETOROLAC TROMETHAMINE 15 MG: 15 INJECTION, SOLUTION INTRAMUSCULAR; INTRAVENOUS at 15:24

## 2021-05-24 RX ADMIN — SODIUM CHLORIDE, POTASSIUM CHLORIDE, SODIUM LACTATE AND CALCIUM CHLORIDE 1000 ML: 600; 310; 30; 20 INJECTION, SOLUTION INTRAVENOUS at 15:18

## 2021-05-24 ASSESSMENT — ENCOUNTER SYMPTOMS
WHEEZING: 0
APNEA: 0
COUGH: 1
NAUSEA: 1
SHORTNESS OF BREATH: 1
DIARRHEA: 0
CONSTIPATION: 0
VOMITING: 1
ABDOMINAL PAIN: 0
ABDOMINAL DISTENTION: 0
CHEST TIGHTNESS: 0

## 2021-05-24 ASSESSMENT — PAIN DESCRIPTION - LOCATION: LOCATION: CHEST

## 2021-05-24 ASSESSMENT — PAIN SCALES - GENERAL
PAINLEVEL_OUTOF10: 9
PAINLEVEL_OUTOF10: 9

## 2021-05-24 NOTE — ED NOTES
Resting comfortably. No s/s of distress. Will continue to monitor.       Mich Amos RN  05/24/21 0258

## 2021-05-24 NOTE — ED NOTES
Presents to ED with shortness of breath and chest pain with inspiration. Was seen in ED last night with unremarkable labs. Reports vomiting 4x today at home. Patient has a strong, productive cough and does not appear to be in acute distress.       Cathryn Lane RN  05/24/21 [id number]

## 2021-05-24 NOTE — ED PROVIDER NOTES
[facility] ED  Emergency Department Encounter  EmergencyMedicine Resident     Pt Name:Vaibhav Serrano  MRN: [id number]  [place] 1990  Date of evaluation: 5/24/21  PCP:  Ruy Ramirez MD    [address] [place] [address]       Chief Complaint   Patient presents with    Chest Pain    Shortness of Breath    Cough    Emesis       HISTORY OF PRESENT ILLNESS  (Location/Symptom, Timing/Onset, Context/Setting, Quality, Duration, Modifying Factors, Severity.)      Saima Figueroa is a 32 y.o. male who presents with 3-day history of nausea, vomiting, cough and shortness of breath. Patient states he ate some Shar food about 2 days ago and since then has started experiencing these complaints. Patient states he has vomited about 4-5 times over the last 24 hours. Patient was seen in the ED yesterday and was evaluated for similar complaints, discharged after symptomatic improvement. Patient states the vomitus has included water and food, no bile or blood. Patient complains that the shortness of breath is worse when lying down, better when sitting and exacerbated on physical activity. Patient states he has been persistently coughing for the last 3 days, states he does not have any sustained period of relief. Patient also complains of bilateral rib pain in the lateral area, states the pain is worse when he is coughing and started after the coughing started. Patient denies any fever, chills, diarrhea, constipation. Patient denies any similar complaints in the past.  Patient has multiple comorbidities and states he has been compliant with all of his medications. 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 has no past surgical history on file.       Social History     Socioeconomic History    Marital status:      Spouse name: Not on file    Number of children: Not on file    Years of education: Not on file    Highest education level: Not on file   Occupational History    Not on file   Tobacco Use    Smoking status: Current Every Day Smoker     Packs/day: 0.50     Years: 10.00     Pack years: 5.00     Types: Cigarettes     Last attempt to quit: 2013     Years since quittin.8    Smokeless tobacco: Never Used   Substance and Sexual Activity    Alcohol use: No    Drug use: Not Currently     Types: Marijuana     Comment: former marijuana    Sexual activity: Yes     Partners: Female   Other Topics Concern    Not on file   Social History Narrative    Not on file     Social Determinants of Health     Financial Resource Strain:     Difficulty of Paying Living Expenses:    Food Insecurity:     Worried About Running Out of Food in the Last Year:     [address] in the Last Year:    Transportation Needs:     Lack of Transportation (Medical):  Lack of Transportation (Non-Medical):    Physical Activity:     Days of Exercise per Week:     Minutes of Exercise per Session:    Stress:     Feeling of Stress :    Social Connections:     Frequency of Communication with Friends and Family:     Frequency of Social Gatherings with Friends and Family:     Attends Jainism Services:     Active Member of Clubs or Organizations:     Attends Club or Organization Meetings:     Marital Status:    Intimate Partner Violence:     Fear of Current or Ex-Partner:     Emotionally Abused:     Physically Abused:     Sexually Abused:        Family History   Problem Relation Age of Onset    Diabetes Father     Other Father         PAD       Allergies:  Patient has no known allergies. Home Medications:  Prior to Admission medications    Medication Sig Start Date End Date Taking?  Authorizing Provider   polyethylene glycol (MIRALAX) 17 g packet Take 17 g by mouth daily as needed for Constipation 21  Radha Auguste,    ondansetron (ZOFRAN ODT) 4 MG disintegrating tablet Take 1 tablet by mouth every 8 hours as needed for Nausea 5/23/21   Regina Auguste DO   cyclobenzaprine (FLEXERIL) 5 MG tablet take 1 tablet by mouth twice a day if needed for muscle spasm 5/20/21   Sue Christine MD   pantoprazole (PROTONIX) 40 MG tablet Take 1 tablet by mouth daily Take 30 mins before breakfast daily 5/5/21   Ghada Fernández MD   BD PEN NEEDLE FESTUS 2ND GEN 32G X 4 MM MISC use three times a day 3/1/21   Louis Provider, MD   atorvastatin (LIPITOR) 20 MG tablet Take 1 tablet by mouth daily 4/22/21   Lillian Nguyen MD   lisinopril (PRINIVIL;ZESTRIL) 30 MG tablet Take 1 tablet by mouth daily 4/22/21   Lillian Nguyen MD   insulin glargine [facility]) 100 UNIT/ML injection pen Inject 20 Units into the skin nightly 4/22/21   Lillian Nguyen MD   Continuous Blood Gluc Sensor ([address] [place] [address]) MISC 1 each by Does not apply route continuous prn (blood glucose checks qHS and AC) 4/22/21   Lillian Nguyen MD   Continuous Blood Gluc Sensor (FREESTYLE KATIE 14 DAY SENSOR) MISC 1 each by Does not apply route continuous prn (blood glucose checks) 4/22/21   Lillian Nguyen MD   Alcohol Swabs (ALCOHOL PREP) 70 % PADS 1 box by Does not apply route 3 times daily (with meals) Use_____times per day  Diagnosis: 250.0   Diabetes ___Insulin-Dependent___Non-Insulin Dependent 3/1/21   Lisbet Francis MD   blood glucose monitor strips 1 strip by Other route 3 times daily (with meals) Test___times daily    Diagnosis: 250.0   Diabetes Mellitus____Insulin Dependent____Non-Insulin Dependent 3/1/21   Lisbet Francis MD   insulin aspart (NOVOLOG) 100 UNIT/ML injection pen Inject 5 Units into the skin 3 times daily (before meals)  Patient not taking: Reported on 4/22/2021 3/1/21   Lisbet Francis MD   Insulin Pen Needle (COMFORT EZ PEN NEEDLES) 32G X 6 MM MISC USE AS DIRECTED WITH INSULIN PENS (UP TO 3 TIMES DAILY) 3/1/21   Lisbet Francis MD   Lancets MISC 1 each by Does not apply route daily Use___times daily Diagnisis:250.0  Diabetes Mellitus____Insulin Dependent___Non-Insulin Dependent 3/1/21   Neil Zelaya MD   acetaminophen (TYLENOL) 325 MG tablet Take 2 tablets by mouth every 6 hours as needed for Pain 11/10/20   Jacquelin Chaney DO   ibuprofen (ADVIL;MOTRIN) 600 MG tablet Take 1 tablet by mouth every 6 hours as needed for Pain 11/10/20   Jacquelin Chaney, DO   Blood Pressure KIT 1 each by Does not apply route as needed (BP check) 9/11/20   Sharon Coffman MD   glucose monitoring kit (FREESTYLE) monitoring kit 1 kit by Does not apply route daily 5/16/20   Michelle Urban MD   albuterol sulfate HFA (PROAIR HFA) 108 (90 Base) MCG/ACT inhaler Inhale 2 puffs into the lungs every 4 hours as needed for Wheezing  Patient not taking: Reported on 4/22/2021 9/6/19   Maria Ross PA-C       REVIEW OF SYSTEMS    (2-9 systems for level 4, 10 or more for level 5)      Review of Systems   Constitutional: Positive for chills. Negative for activity change, appetite change, diaphoresis and fever. Respiratory: Positive for cough and shortness of breath. Negative for apnea, chest tightness and wheezing. Cardiovascular: Positive for chest pain. Negative for palpitations. Gastrointestinal: Positive for nausea and vomiting. Negative for abdominal distention, abdominal pain, constipation and diarrhea. Genitourinary: Negative for difficulty urinating, dysuria and frequency. Musculoskeletal: Negative for arthralgias and myalgias. Neurological: Positive for light-headedness. Negative for dizziness and headaches. Psychiatric/Behavioral: Negative for agitation and confusion. All other systems reviewed and are negative. PHYSICAL EXAM   (up to 7 for level 4, 8 or more for level 5)      INITIAL VITALS:   BP (!) 179/80   Pulse 87   Temp 98.2 °F (36.8 °C) (Oral)   Resp (!) 32   SpO2 92%     Physical Exam  Vitals reviewed. Constitutional:       Appearance: Normal appearance. He is ill-appearing.    HENT:      Head: Normocephalic and atraumatic. Cardiovascular:      Rate and Rhythm: Normal rate and regular rhythm. Pulses: Normal pulses. Heart sounds: Normal heart sounds. Pulmonary:      Effort: Pulmonary effort is normal. No respiratory distress. Breath sounds: Normal breath sounds. Abdominal:      General: Bowel sounds are normal. There is no distension. Palpations: Abdomen is soft. Tenderness: There is no abdominal tenderness. There is no guarding. Musculoskeletal:         General: Normal range of motion. Cervical back: Normal range of motion. Skin:     General: Skin is warm. Neurological:      General: No focal deficit present. Mental Status: He is alert and oriented to person, place, and time. Psychiatric:         Mood and Affect: Mood normal.         DIFFERENTIAL  DIAGNOSIS     PLAN (Kassandra Santiago / Basilio Bourgeois / EKG):  Orders Placed This Encounter   Procedures    COVID-19, Rapid    XR CHEST PORTABLE    CT CHEST PULMONARY EMBOLISM W CONTRAST    CBC Auto Differential    Comprehensive Metabolic Panel w/ Reflex to MG    CK    MYOGLOBIN, SERUM    D-Dimer, Quantitative    Inpatient consult to Internal Medicine    EKG 12 Lead    PATIENT STATUS (FROM ED OR OR/PROCEDURAL) Observation       MEDICATIONS ORDERED:  Orders Placed This Encounter   Medications    lactated ringers infusion 1,000 mL    ketorolac (TORADOL) injection 15 mg    ondansetron (ZOFRAN) injection 4 mg    famotidine (PEPCID) injection 20 mg    iopamidol (ISOVUE-370) 76 % injection 75 mL    promethazine (PHENERGAN) injection 25 mg       DDX: Hypoxia, nausea, vomiting    DIAGNOSTIC RESULTS / EMERGENCY DEPARTMENT COURSE / MDM   LAB RESULTS:  Results for orders placed or performed during the hospital encounter of 05/24/21   COVID-19, Rapid    Specimen: Nasopharyngeal Swab   Result Value Ref Range    Specimen Description . NASOPHARYNGEAL SWAB     SARS-CoV-2, Rapid Not Detected Not Detected   CBC Auto Differential Result Value Ref Range    WBC 12.4 (H) 3.5 - 11.3 k/uL    RBC 4.19 (L) 4.21 - 5.77 m/uL    Hemoglobin 13.3 13.0 - 17.0 g/dL    Hematocrit 40.3 (L) 40.7 - 50.3 %    MCV 96.2 82.6 - 102.9 fL    MCH 31.7 25.2 - 33.5 pg    MCHC 33.0 28.4 - 34.8 g/dL    RDW 13.0 11.8 - 14.4 %    Platelets 134 865 - 580 k/uL    MPV 8.9 8.1 - 13.5 fL    NRBC Automated 0.0 0.0 per 100 WBC    Differential Type NOT REPORTED     Seg Neutrophils 85 (H) 36 - 65 %    Lymphocytes 9 (L) 24 - 43 %    Monocytes 5 3 - 12 %    Eosinophils % 1 1 - 4 %    Basophils 0 0 - 2 %    Immature Granulocytes 0 0 %    Segs Absolute 10.58 (H) 1.50 - 8.10 k/uL    Absolute Lymph # 1.08 (L) 1.10 - 3.70 k/uL    Absolute Mono # 0.56 0.10 - 1.20 k/uL    Absolute Eos # 0.07 0.00 - 0.44 k/uL    Basophils Absolute 0.04 0.00 - 0.20 k/uL    Absolute Immature Granulocyte 0.04 0.00 - 0.30 k/uL    WBC Morphology NOT REPORTED     RBC Morphology NOT REPORTED     Platelet Estimate NOT REPORTED    Comprehensive Metabolic Panel w/ Reflex to MG   Result Value Ref Range    Glucose 311 (H) 70 - 99 mg/dL    BUN 19 6 - 20 mg/dL    CREATININE 1.27 (H) 0.70 - 1.20 mg/dL    Bun/Cre Ratio NOT REPORTED 9 - 20    Calcium 8.7 8.6 - 10.4 mg/dL    Sodium 133 (L) 135 - 144 mmol/L    Potassium 4.0 3.7 - 5.3 mmol/L    Chloride 97 (L) 98 - 107 mmol/L    CO2 24 20 - 31 mmol/L    Anion Gap 12 9 - 17 mmol/L    Alkaline Phosphatase 113 40 - 129 U/L    ALT 33 5 - 41 U/L    AST 28 <40 U/L    Total Bilirubin 0.40 0.3 - 1.2 mg/dL    Total Protein 6.4 6.4 - 8.3 g/dL    Albumin 3.5 3.5 - 5.2 g/dL    Albumin/Globulin Ratio 1.2 1.0 - 2.5    GFR Non-African American >60 >60 mL/min    GFR African American >60 >60 mL/min    GFR Comment          GFR Staging NOT REPORTED    CK   Result Value Ref Range    Total CK 1,279 (H) 39 - 308 U/L   MYOGLOBIN, SERUM   Result Value Ref Range    Myoglobin 145 (H) 28 - 72 ng/mL         RADIOLOGY:  CXR: Unremarkable    EKG  Normal sinus rhythm    All EKG's are interpreted by the Emergency Department Physician who either signs or Co-signs this chart in the absence of a cardiologist.    EMERGENCY DEPARTMENT COURSE:  Patient seen and evaluated in the ED. No acute distress at this time, complains of nausea, vomiting, shortness of breath and cough over the last few days. Patient currently appears fatigued, comfortably lying in bed. Complains of shortness of breath while lying down. Blood pressure elevated, SPO2 91%. Blood work and chest x-ray ordered. IV fluid bolus ordered. CXR Unremarkable. Blood work showed elevated CK and myoglobin which is consistent with patient's history of McArdle disease. Patient also has elevated blood glucose, anion gap and bicarb normal.  Rapid Covid ordered. Rapid Covid negative. Patient continues to have nausea, has not been able to retain anything over the last 3 days. Will require admission for symptomatic management and further investigation. Patient was reevaluated and stated that he is a little better, decision to attempt fluid challenge. Zofran administered. Patient tolerated fluid well, continued to complain of nausea but did not have any emesis. Patient continued to have persistent complaints of chest pain, tachypnea and intermittent episodes of hypoxia, concern for PE.  CT chest PE ordered. CT chest PE was negative for any acute abnormality. Patient continued to have SPO2 in the early 90s, tachypnea with respiratory rate around 30. Patient did not appear to be in any acute distress, continues to complain of nausea and received Phenergan. Patient admitted to observation unit for monitoring overnight. Orders placed. Patient agreeable to treatment plan. PROCEDURES:      CONSULTS:  IP CONSULT TO INTERNAL MEDICINE    CRITICAL CARE:  Please see attending note    FINAL IMPRESSION      1. Hypoxemia    2.  Tachypnea          DISPOSITION / PLAN     DISPOSITION Admitted 05/24/2021 08:58:41 PM      PATIENT REFERRED TO:  No follow-up provider specified.     DISCHARGE MEDICATIONS:  New Prescriptions    No medications on file       Jadiel Ochoa MD  Emergency Medicine Resident    (Please note that portions of thisnote were completed with a voice recognition program.  Efforts were made to edit the dictations but occasionally words are mis-transcribed.)       Na Bess MD  Resident  05/24/21 [id number]

## 2021-05-24 NOTE — ED PROVIDER NOTES
[address] HANDOFF       Handoff taken on the following patient from prior Attending Physician:  Pt Name: Analilia Bautista  PCP:  Alannah Winter MD    Attestation  I was available and discussed any additional care issues that arose and coordinated the management plans with the resident(s) caring for the patient during my duty period. Any areas of disagreement with resident's documentation of care or procedures are noted on the chart. I was personally present for the key portions of any/all procedures during my duty period. I have documented in the chart those procedures where I was not present during the key portions.          CHIEF COMPLAINT       Chief Complaint   Patient presents with    Chest Pain    Shortness of Breath    Cough    Emesis         CURRENT MEDICATIONS     Previous Medications  Previous Medications    ACETAMINOPHEN (TYLENOL) 325 MG TABLET    Take 2 tablets by mouth every 6 hours as needed for Pain    ALBUTEROL SULFATE HFA (PROAIR HFA) 108 (90 BASE) MCG/ACT INHALER    Inhale 2 puffs into the lungs every 4 hours as needed for Wheezing    ALCOHOL SWABS (ALCOHOL PREP) 70 % PADS    1 box by Does not apply route 3 times daily (with meals) Use_____times per day  Diagnosis: 250.0   Diabetes ___Insulin-Dependent___Non-Insulin Dependent    ATORVASTATIN (LIPITOR) 20 MG TABLET    Take 1 tablet by mouth daily    BD PEN NEEDLE FESTUS 2ND GEN 32G X 4 MM MISC    use three times a day    BLOOD GLUCOSE MONITOR STRIPS    1 strip by Other route 3 times daily (with meals) Test___times daily    Diagnosis: 250.0   Diabetes Mellitus____Insulin Dependent____Non-Insulin Dependent    BLOOD PRESSURE KIT    1 each by Does not apply route as needed (BP check)    CONTINUOUS BLOOD GLUC SENSOR (FREESTYLE KATIE 14 DAY SENSOR) MISC    1 each by Does not apply route continuous prn (blood glucose checks)    CONTINUOUS BLOOD GLUC SENSOR ([address] [place] [address]) MISC    1 each by Does not apply Seg Neutrophils 85 (*)     Lymphocytes 9 (*)     Segs Absolute 10.58 (*)     Absolute Lymph # 1.08 (*)     All other components within normal limits   COMPREHENSIVE METABOLIC PANEL W/ REFLEX TO MG FOR LOW K - Abnormal; Notable for the following components:    Glucose 311 (*)     CREATININE 1.27 (*)     Sodium 133 (*)     Chloride 97 (*)     All other components within normal limits   CK - Abnormal; Notable for the following components: Total CK 1,279 (*)     All other components within normal limits   MYOGLOBIN, SERUM - Abnormal; Notable for the following components:    Myoglobin 145 (*)     All other components within normal limits   COVID-19, RAPID   D-DIMER, QUANTITATIVE           PLAN/ TASKS OUTSTANDING     This patient is a 32 y.o. Male. 32year-old with chest pain or shortness of breath, cough and vomiting.   Here yesterday, tachypneic and hypoxic by vital signs, during signout discussion was had regarding pulmonary embolus work-up,    Patient is slated for admission at this time, will follow up PE work-up  (Please note that portions of this note were completed with a voice recognition program.  Efforts were made to edit the dictations but occasionally words are mis-transcribed.)    Lorri Minor MD,, MD  Attending Emergency Physician       Lorri Minor MD  05/24/21 [place]

## 2021-05-24 NOTE — ED PROVIDER NOTES
8 hours as needed for Nausea    POLYETHYLENE GLYCOL (MIRALAX) 17 G PACKET    Take 17 g by mouth daily as needed for Constipation           Sally Deleon DO  Emergency Medicine Resident  Alfredo Deleon, [place]  Resident  05/23/21 9118

## 2021-05-24 NOTE — TELEPHONE ENCOUNTER
Medication Management Service (MMS)  [facility]  [address] was reffered to Medication Management Service by Dr. Velma Cook for diabetes management. [facility] has been unable to reach patient after the following attempts:    Initial phone call on: 4/29/21    Second phone call on: 5/11/21    Letter sent on: 5/11/21    At this time, the patient will be discharged from [facility] and the referral will be closed. Thank you.      Araseli Delarosa, PharmD  PGY-2 Ambulatory Care Resident Pharmacist  Medication Management Service  [phone]  5/24/2021  12:49 PM

## 2021-05-24 NOTE — ED NOTES
Resting comfortably. No s/s of distress. Will continue to monitor.       Romy Zeng RN  05/24/21 7610

## 2021-05-24 NOTE — ED PROVIDER NOTES
[facility]  Emergency Department  Faculty Attestation     I performed a history and physical examination of the patient and discussed management with the resident. I reviewed the residents note and agree with the documented findings and plan of care. Any areas of disagreement are noted on the chart. I was personally present for the key portions of any procedures. I have documented in the chart those procedures where I was not present during the key portions. I have reviewed the emergency nurses triage note. I agree with the chief complaint, past medical history, past surgical history, allergies, medications, social and family history as documented unless otherwise noted below. For Physician Assistant/ Nurse Practitioner cases/documentation I have personally evaluated this patient and have completed at least one if not all key elements of the E/M (history, physical exam, and MDM). Additional findings are as noted. Primary Care Physician:  Sena Galicia MD    Screenings:  [unfilled]    CHIEF COMPLAINT       Chief Complaint   Patient presents with    Chest Pain    Shortness of Breath    Cough    Emesis       RECENT VITALS:   Temp: 98.2 °F (36.8 °C),  Pulse: 90, Resp: 21, BP: (!) 185/96    LABS:  Labs Reviewed   COVID-19, RAPID   CBC WITH AUTO DIFFERENTIAL   COMPREHENSIVE METABOLIC PANEL W/ REFLEX TO MG FOR LOW K   CK   MYOGLOBIN, SERUM       Radiology  XR CHEST PORTABLE    (Results Pending)         Attending Physician Additional  Notes    Patient is had 2 to 3 days of illness, seen here yesterday for the same complaints, now feeling worse. He has persistence of cough, bilateral costal margin pain as well as nausea and vomiting. Unable to keep down fluids. He stopped his insulin. Chills but no myalgias or documented fevers. No blood in the emesis or in his stools.   Laboratory studies imaging yesterday were normal.  On exam he is uncomfortable, has persistent cough, borderline oxygen saturation initially 91%, now 94%. Breath sounds are diminished with diffuse rhonchi no wheezing expiratory prolongation. No accessory muscle use or retractions. Abdomen is soft without tenderness. He does have tenderness along the costal margins. No edema cords Homans calf tenderness. Mouth slightly dry, no ketone odor noted. Impression is vomiting, cough, dehydration, concern for pneumonia or Covid. Plan is IV access, fluids, antiemetics, analgesics, antitussives, imaging, reassess, disposition, anticipate admission if persistent symptoms or hypoxemia. Nedra Bias.  Lalita Banuelos MD, [facility]  Attending Emergency  Physician               Gary Walters MD  05/24/21 4263

## 2021-05-24 NOTE — ED PROVIDER NOTES
[facility] ED  Emergency Department  Senior Resident Attestation     Primary Care Physician  Chuck Mosher MD    I performed a history and physical examination of the patient and discussed management with the fay resident. I reviewed the fay residents note and agree with the documented findings and plan of care. Any areas of disagreement are noted on the chart. Case was then discussed with Faculty Attending Supervisor for additional medical management. PERTINENT ATTENDING PHYSICIAN COMMENTS:    HISTORY:   Jerzy Willis is a 32 y.o. 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 and presents with complaint of lower rib cage pain, nausea vomiting shortness of breath. Patient reports he felt this way for last several days. He was seen for the same last night where work-up was essentially unremarkable he reports his symptoms have persisted since onset. He notes a cough productive of yellowish sputum,  subjective fevers and chills, shortness of breath, lower rib cage pain as well as nonbilious nonbloody emesis. On exam he is nontoxic-appearing no acute distress. His vitals are unremarkable. Heart regular rate and rhythm, lungs are clear to auscultation bilaterally. Abdomen is soft and nontender. No lower extremity edema noted. Differential diagnosis includes DKA, pneumonia, JASIEL, gastroenteritis, among others. Doubt ACS given constellations of infection symptoms, similarly doubt PE. Plan is for labs, chest x-ray, EKG, IV fluid rehydration reassess. EKG interpretation: EKG performed at 1418. Shows normal sinus rhythm with normal axis. Normal intervals. No evidence of voltage or enlargement abnormalities. No significant changes when compared to EKG dated 8/4/2020.     CRITICAL CARE TIME:    None    Claudia Hayes DO  Senior Resident Physician    (Please note that portions of this note were completed with a voice recognition program.  Efforts were made to edit the dictations but occasionally words are mis-transcribed.)      Jordan Fitzpatrick DO  Resident  05/24/21 2548

## 2021-05-24 NOTE — ED PROVIDER NOTES
[facility] ED  eMERGENCY dEPARTMENT eNCOUnter   Attending Attestation     Pt Name: Saima Figueroa  MRN: [id number]  [place] 1990  Date of evaluation: 5/23/21       Saima Figueroa is a 32 y.o. male who presents with Abdominal Pain (abdominal pain, emesis since 0600, unable to keep anything down) and Emesis      History: Patient presents with abdominal pain and vomiting since exam.  Patient said he is coughing and has caused him to vomit. Patient said he is not been able to keep any food down today. Exam: Heart rate and rhythm are regular. Lungs are clear to auscultation bilaterally. Abdomen is soft, nontender. Patient awake, alert, acting appropriately. Patient is coughing in the room. Consider posttussive emesis. Will get chest x-ray. Patient was also complaining of abdominal pain and has blood in his urine. Plan for CT abdomen without contrast to evaluate for stone. Patient initially had vague abdominal complaints for resident however his history of present illness has changed somewhat for myself. I performed a history and physical examination of the patient and discussed management with the resident. I reviewed the residents note and agree with the documented findings and plan of care. Any areas of disagreement are noted on the chart. I was personally present for the key portions of any procedures. I have documented in the chart those procedures where I was not present during the key portions. I have personally reviewed all images and agree with the resident's interpretation. I have reviewed the emergency nurses triage note. I agree with the chief complaint, past medical history, past surgical history, allergies, medications, social and family history as documented unless otherwise noted below. Documentation of the HPI, Physical Exam and Medical Decision Making performed by medical students or scribes is based on my personal performance of the HPI, PE and MDM.  For Phys

## 2021-05-25 VITALS
DIASTOLIC BLOOD PRESSURE: 89 MMHG | BODY MASS INDEX: 28.93 KG/M2 | HEART RATE: 90 BPM | TEMPERATURE: 97.2 F | RESPIRATION RATE: 16 BRPM | HEIGHT: 66 IN | WEIGHT: 180 LBS | OXYGEN SATURATION: 94 % | SYSTOLIC BLOOD PRESSURE: 159 MMHG

## 2021-05-25 LAB
D-DIMER QUANTITATIVE: 0.4 MG/L FEU
EKG ATRIAL RATE: 91 BPM
EKG P AXIS: 69 DEGREES
EKG P-R INTERVAL: 124 MS
EKG Q-T INTERVAL: 346 MS
EKG QRS DURATION: 76 MS
EKG QTC CALCULATION (BAZETT): 425 MS
EKG R AXIS: 9 DEGREES
EKG T AXIS: 53 DEGREES
EKG VENTRICULAR RATE: 91 BPM
GLUCOSE BLD-MCNC: 239 MG/DL (ref 75–110)
GLUCOSE BLD-MCNC: 246 MG/DL (ref 75–110)
GLUCOSE BLD-MCNC: 358 MG/DL (ref 75–110)
GLUCOSE BLD-MCNC: 363 MG/DL (ref 75–110)

## 2021-05-25 PROCEDURE — G0378 HOSPITAL OBSERVATION PER HR: HCPCS

## 2021-05-25 PROCEDURE — 2580000003 HC RX 258

## 2021-05-25 PROCEDURE — 36415 COLL VENOUS BLD VENIPUNCTURE: CPT

## 2021-05-25 PROCEDURE — 6370000000 HC RX 637 (ALT 250 FOR IP): Performed by: EMERGENCY MEDICINE

## 2021-05-25 PROCEDURE — 6360000002 HC RX W HCPCS: Performed by: STUDENT IN AN ORGANIZED HEALTH CARE EDUCATION/TRAINING PROGRAM

## 2021-05-25 PROCEDURE — 82947 ASSAY GLUCOSE BLOOD QUANT: CPT

## 2021-05-25 PROCEDURE — 2580000003 HC RX 258: Performed by: STUDENT IN AN ORGANIZED HEALTH CARE EDUCATION/TRAINING PROGRAM

## 2021-05-25 PROCEDURE — 6370000000 HC RX 637 (ALT 250 FOR IP): Performed by: STUDENT IN AN ORGANIZED HEALTH CARE EDUCATION/TRAINING PROGRAM

## 2021-05-25 PROCEDURE — 85379 FIBRIN DEGRADATION QUANT: CPT

## 2021-05-25 PROCEDURE — 96372 THER/PROPH/DIAG INJ SC/IM: CPT

## 2021-05-25 RX ORDER — SODIUM CHLORIDE 9 MG/ML
INJECTION INTRAVENOUS
Status: COMPLETED
Start: 2021-05-25 | End: 2021-05-25

## 2021-05-25 RX ORDER — ALBUTEROL SULFATE 90 UG/1
2 AEROSOL, METERED RESPIRATORY (INHALATION) EVERY 6 HOURS PRN
Qty: 1 INHALER | Refills: 0 | Status: SHIPPED | OUTPATIENT
Start: 2021-05-25

## 2021-05-25 RX ADMIN — SODIUM CHLORIDE, PRESERVATIVE FREE 10 ML: 5 INJECTION INTRAVENOUS at 09:28

## 2021-05-25 RX ADMIN — INSULIN LISPRO 4 UNITS: 100 INJECTION, SOLUTION INTRAVENOUS; SUBCUTANEOUS at 12:23

## 2021-05-25 RX ADMIN — SODIUM CHLORIDE 10 ML: 9 INJECTION, SOLUTION INTRAMUSCULAR; INTRAVENOUS; SUBCUTANEOUS at 08:12

## 2021-05-25 RX ADMIN — PROMETHAZINE HYDROCHLORIDE 12.5 MG: 25 INJECTION INTRAMUSCULAR; INTRAVENOUS at 08:12

## 2021-05-25 RX ADMIN — SODIUM CHLORIDE, PRESERVATIVE FREE 10 ML: 5 INJECTION INTRAVENOUS at 01:24

## 2021-05-25 RX ADMIN — INSULIN LISPRO 10 UNITS: 100 INJECTION, SOLUTION INTRAVENOUS; SUBCUTANEOUS at 08:15

## 2021-05-25 RX ADMIN — INSULIN LISPRO 3 UNITS: 100 INJECTION, SOLUTION INTRAVENOUS; SUBCUTANEOUS at 03:23

## 2021-05-25 RX ADMIN — LISINOPRIL 30 MG: 10 TABLET ORAL at 09:28

## 2021-05-25 RX ADMIN — PANTOPRAZOLE SODIUM 40 MG: 40 TABLET, DELAYED RELEASE ORAL at 09:28

## 2021-05-25 ASSESSMENT — ENCOUNTER SYMPTOMS
CONSTIPATION: 0
SHORTNESS OF BREATH: 1
NAUSEA: 0
ABDOMINAL PAIN: 0
TACHYPNEA: 1
COUGH: 1
COLOR CHANGE: 0
VOMITING: 0
WHEEZING: 1
CHEST TIGHTNESS: 0
DIARRHEA: 0

## 2021-05-25 ASSESSMENT — PAIN SCALES - GENERAL: PAINLEVEL_OUTOF10: 6

## 2021-05-25 NOTE — PROGRESS NOTES
[facility]  CDU / OBSERVATION ENCOUNTER  ATTENDING NOTE       I performed a history and physical examination of the patient and discussed management with the resident or midlevel provider. I reviewed the resident or midlevel provider's note and agree with the documented findings and plan of care. Any areas of disagreement are noted on the chart. I was personally present for the key portions of any procedures. I have documented in the chart those procedures where I was not present during the key portions. I have reviewed the nurses notes. I agree with the chief complaint, past medical history, past surgical history, allergies, medications, social and family history as documented unless otherwise noted below. The Family history, social history, and ROS are effectively unchanged since admission unless noted elsewhere in the chart. 3-day history of nausea vomiting cough and shortness of breath. Patient states he was experiencing symptoms since eating some Shar food a couple days ago. Patient has had vomiting 4-5 times over the last 24 hours prior to ED presentation. Patient is in ED return as he had been seen yesterday for similar complaints. Patient has shortness of breath worse on lying down and better when sitting up and exacerbated by physical activity. Patient has had persistent cough for 3 days as well. Patient complains of bilateral rib pain and pain when coughing. Patient has no fever, chills, diarrhea, constipation. Covid swab negative. Slightly elevated white count. Apparently with low SPO2 in ED. Patient received IV fluids. Chest x-ray negative. 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 Patient also with elevated glucose but normal bicarb. No previous cardiac testing. Patient admitted for control of nausea and vomiting. Patient admitted for hydration.     Gniger Hadley MD  Attending Emergency  Physician

## 2021-05-25 NOTE — DISCHARGE SUMMARY
CDU Discharge Summary        Patient:  Sobeida Bran  YOB: 1990    MRN: [id number]   Acct: [de-identified]    Primary Care Physician: Anjum Celis MD    Admit date:  5/24/2021  2:32 PM  Discharge date: 5/25/2021  5:38 PM     Discharge Diagnoses:     1.)  Acute dizziness with associated emesis, cough and shortness of breath secondary to unknown etiology possibly vertigo. Improved with meclizine, Tessalon, antiemetics and rest    Follow-up:  Call today/tomorrow for a follow up appointment with Anjum Celis MD , or return to the Emergency Room with worsening symptoms    Stressed to patient the importance of following up with primary care doctor for further workup/management of symptoms. Pt verbalizes understanding and agrees with plan.     Discharge Medication Changes:       Medication List      CHANGE how you take these medications    albuterol sulfate  (90 Base) MCG/ACT inhaler  Commonly known as: ProAir HFA  Inhale 2 puffs into the lungs every 6 hours as needed for Wheezing or Shortness of Breath  What changed:   · when to take this  · reasons to take this        CONTINUE taking these medications    acetaminophen 325 MG tablet  Commonly known as: Tylenol  Take 2 tablets by mouth every 6 hours as needed for Pain     Alcohol Prep 70 % Pads  1 box by Does not apply route 3 times daily (with meals) Use_____times per day  Diagnosis: 250.0   Diabetes ___Insulin-Dependent___Non-Insulin Dependent     atorvastatin 20 MG tablet  Commonly known as: Lipitor  Take 1 tablet by mouth daily     Basaglar KwikPen 100 UNIT/ML injection pen  Generic drug: insulin glargine  Inject 20 Units into the skin nightly     blood glucose test strips  1 strip by Other route 3 times daily (with meals) Test___times daily    Diagnosis: 250.0   Diabetes Mellitus____Insulin Dependent____Non-Insulin Dependent     Blood Pressure Kit  1 each by Does not apply route as needed (BP check)     * Comfort EZ Pen Needles 32G X 6 MM Misc Generic drug: Insulin Pen Needle  USE AS DIRECTED WITH INSULIN PENS (UP TO 3 TIMES DAILY)     * BD Pen Needle Linda 2nd Gen 32G X 4 MM Misc  Generic drug: Insulin Pen Needle     cyclobenzaprine 5 MG tablet  Commonly known as: FLEXERIL  take 1 tablet by mouth twice a day if needed for muscle spasm     * FreeStyle Mary Sensor System Misc  1 each by Does not apply route continuous prn (blood glucose checks qHS and AC)     * FreeStyle Mary 14 Day Sensor Misc  1 each by Does not apply route continuous prn (blood glucose checks)     glucose monitoring kit monitoring kit  1 kit by Does not apply route daily     ibuprofen 600 MG tablet  Commonly known as: ADVIL;MOTRIN  Take 1 tablet by mouth every 6 hours as needed for Pain     insulin aspart 100 UNIT/ML injection pen  Commonly known as: NOVOLOG  Inject 5 Units into the skin 3 times daily (before meals)     Lancets Misc  1 each by Does not apply route daily Use___times daily    Diagnisis:250.0  Diabetes Mellitus____Insulin Dependent___Non-Insulin Dependent     lisinopril 30 MG tablet  Commonly known as: PRINIVIL;ZESTRIL  Take 1 tablet by mouth daily     ondansetron 4 MG disintegrating tablet  Commonly known as: Zofran ODT  Take 1 tablet by mouth every 8 hours as needed for Nausea     pantoprazole 40 MG tablet  Commonly known as: PROTONIX  Take 1 tablet by mouth daily Take 30 mins before breakfast daily     polyethylene glycol 17 g packet  Commonly known as: MiraLax  Take 17 g by mouth daily as needed for Constipation         * This list has 4 medication(s) that are the same as other medications prescribed for you. Read the directions carefully, and ask your doctor or other care provider to review them with you.                Where to Get Your Medications      You can get these medications from any pharmacy    Bring a paper prescription for each of these medications  · albuterol sulfate  (90 Base) MCG/ACT inhaler         Diet:  DIET CARB CONTROL;, advance as tolerated     Activity:  As tolerated    Consultants: IP CONSULT TO INTERNAL MEDICINE    Procedures:  Not indicated     Diagnostic Test:   Results for orders placed or performed during the hospital encounter of 05/24/21   COVID-19, Rapid    Specimen: Nasopharyngeal Swab   Result Value Ref Range    Specimen Description . NASOPHARYNGEAL SWAB     SARS-CoV-2, Rapid Not Detected Not Detected   CBC Auto Differential   Result Value Ref Range    WBC 12.4 (H) 3.5 - 11.3 k/uL    RBC 4.19 (L) 4.21 - 5.77 m/uL    Hemoglobin 13.3 13.0 - 17.0 g/dL    Hematocrit 40.3 (L) 40.7 - 50.3 %    MCV 96.2 82.6 - 102.9 fL    MCH 31.7 25.2 - 33.5 pg    MCHC 33.0 28.4 - 34.8 g/dL    RDW 13.0 11.8 - 14.4 %    Platelets 930 356 - 920 k/uL    MPV 8.9 8.1 - 13.5 fL    NRBC Automated 0.0 0.0 per 100 WBC    Differential Type NOT REPORTED     Seg Neutrophils 85 (H) 36 - 65 %    Lymphocytes 9 (L) 24 - 43 %    Monocytes 5 3 - 12 %    Eosinophils % 1 1 - 4 %    Basophils 0 0 - 2 %    Immature Granulocytes 0 0 %    Segs Absolute 10.58 (H) 1.50 - 8.10 k/uL    Absolute Lymph # 1.08 (L) 1.10 - 3.70 k/uL    Absolute Mono # 0.56 0.10 - 1.20 k/uL    Absolute Eos # 0.07 0.00 - 0.44 k/uL    Basophils Absolute 0.04 0.00 - 0.20 k/uL    Absolute Immature Granulocyte 0.04 0.00 - 0.30 k/uL    WBC Morphology NOT REPORTED     RBC Morphology NOT REPORTED     Platelet Estimate NOT REPORTED    Comprehensive Metabolic Panel w/ Reflex to MG   Result Value Ref Range    Glucose 311 (H) 70 - 99 mg/dL    BUN 19 6 - 20 mg/dL    CREATININE 1.27 (H) 0.70 - 1.20 mg/dL    Bun/Cre Ratio NOT REPORTED 9 - 20    Calcium 8.7 8.6 - 10.4 mg/dL    Sodium 133 (L) 135 - 144 mmol/L    Potassium 4.0 3.7 - 5.3 mmol/L    Chloride 97 (L) 98 - 107 mmol/L    CO2 24 20 - 31 mmol/L    Anion Gap 12 9 - 17 mmol/L    Alkaline Phosphatase 113 40 - 129 U/L    ALT 33 5 - 41 U/L    AST 28 <40 U/L    Total Bilirubin 0.40 0.3 - 1.2 mg/dL    Total Protein 6.4 6.4 - 8.3 g/dL    Albumin 3.5 3.5 - 5.2 g/dL Albumin/Globulin Ratio 1.2 1.0 - 2.5    GFR Non-African American >60 >60 mL/min    GFR African American >60 >60 mL/min    GFR Comment          GFR Staging NOT REPORTED    CK   Result Value Ref Range    Total CK 1,279 (H) 39 - 308 U/L   MYOGLOBIN, SERUM   Result Value Ref Range    Myoglobin 145 (H) 28 - 72 ng/mL   D-Dimer, Quantitative   Result Value Ref Range    D-Dimer, Quant 0.40 mg/L FEU   POC Glucose Fingerstick   Result Value Ref Range    POC Glucose 363 (H) 75 - 110 mg/dL   POC Glucose Fingerstick   Result Value Ref Range    POC Glucose 358 (H) 75 - 110 mg/dL   POC Glucose Fingerstick   Result Value Ref Range    POC Glucose 239 (H) 75 - 110 mg/dL   POC Glucose Fingerstick   Result Value Ref Range    POC Glucose 246 (H) 75 - 110 mg/dL   EKG 12 Lead   Result Value Ref Range    Ventricular Rate 91 BPM    Atrial Rate 91 BPM    P-R Interval 124 ms    QRS Duration 76 ms    Q-T Interval 346 ms    QTc Calculation (Bazett) 425 ms    P Axis 69 degrees    R Axis 9 degrees    T Axis 53 degrees     XR CHEST PORTABLE    Result Date: 5/24/2021  EXAMINATION: ONE XRAY VIEW OF THE CHEST 5/24/2021 12:22 pm COMPARISON: 05/23/2021, 08/04/2020, 07/29/2020 HISTORY: ORDERING SYSTEM PROVIDED HISTORY: Chest pain TECHNOLOGIST PROVIDED HISTORY: Chest pain Reason for Exam: port upright FINDINGS: The lungs are clear . There is no effusion or pneumothorax . The cardiomediastinal silhouette is within normal limits . No acute bony findings . No acute pulmonary process. CT CHEST PULMONARY EMBOLISM W CONTRAST    Result Date: 5/24/2021  EXAMINATION: CTA OF THE CHEST 5/24/2021 8:03 pm TECHNIQUE: CTA of the chest was performed after the administration of intravenous contrast.  Multiplanar reformatted images are provided for review. MIP images are provided for review. Dose modulation, iterative reconstruction, and/or weight based adjustment of the mA/kV was utilized to reduce the radiation dose to as low as reasonably achievable. COMPARISON: 03/13/2011 HISTORY: ORDERING SYSTEM PROVIDED HISTORY: SOB  Hypoxic TECHNOLOGIST PROVIDED HISTORY: SOB  Hypoxic Decision Support Exception - unselect if not a suspected or confirmed emergency medical condition->Emergency Medical Condition (MA) Reason for Exam: SOB  Hypoxic Acuity: Acute Type of Exam: Initial FINDINGS: Pulmonary Arteries: Less than optimal enhancement of pulmonary arteries decreases sensitivity for acute pulmonary emboli particularly of smaller emboli. Within this limitation no large central pulmonary embolus is evident. Pulmonary trunk normal size. Mediastinum: No evidence of mediastinal lymphadenopathy. The heart and pericardium demonstrate no acute abnormality. There is no acute abnormality of the thoracic aorta. Lungs/pleura: Minor right infrahilar scarring and some bronchiolectasis likely residual of remote infection. Previous study from 2011 shows some scattered infiltrates. Upper Abdomen: Limited images of the upper abdomen are unremarkable. Soft Tissues/Bones: No acute bone or soft tissue abnormality. Limited study. There is suboptimal enhancement of pulmonary arteries at time of scanning. This decreases sensitivity for particularly small emboli. Within this limitation no large central embolus. No evidence for pneumonia. Physical Exam:    General appearance - NAD, AOx 3   Lungs -CTAB, no R/R/R  Heart - RRR, no M/R/G  Abdomen - Soft, NT/ND  Neurological:  MAEx4, No focal motor deficit, sensory loss  Extremities - Cap refil <2 sec in all ext., no edema  Skin -warm, dry      Hospital Course:  Clinical course has improved, labs and imaging reviewed. Guille York originally presented to the hospital on 5/24/2021  2:32 PM with multiple complaints including dizziness, shortness of breath, emesis and cough. At that time it was determined that He required further observation and imaging to rule out cardiac or pulmonary origin.  Labs and imaging were followed daily. Imaging results as above. He is medically stable to be discharged. Patient educated to follow-up with primary care doctor within 1 week. Patient educated to monitor blood sugars and return to the emergency department with any returning or worsening symptoms. Disposition: Home    Patient stated that they will not drive themselves home from the hospital if they have gotten pain killers/ narcotics earlier that day and that they will arrange for transportation on their own or work with the  for a ride. Patient counseled NOT to drive while under the influence of narcotics/ pain killers. Condition: Good    Patient stable and ready for discharge home. I have discussed plan of care with patient and they are in understanding. They were instructed to read discharge paperwork. All of their questions and concerns were addressed. Time Spent: 0 day      --  My Supervising Physician for today is Dr. Ambrose Carson  We discussed and agreed upon a treatment plan  Cam Adkins, [facility]  Emergency Medicine Attending Physician    This dictation was generated by voice recognition computer software. Although all attempts are made to edit the dictation for accuracy, there may be errors in the transcription that are not intended.

## 2021-05-25 NOTE — PROGRESS NOTES
[facility] [place] Hypoxia [R09.02] . Vitals:    05/25/21 0747   BP: (!) 177/81   Pulse: 96   Resp: 15   Temp: 97.2 °F (36.2 °C)   SpO2: 94%   . Patients home meds are   Prior to Admission medications    Medication Sig Start Date End Date Taking?  Authorizing Provider   polyethylene glycol (MIRALAX) 17 g packet Take 17 g by mouth daily as needed for Constipation 5/23/21 6/22/21  Regina Auguste,    ondansetron (ZOFRAN ODT) 4 MG disintegrating tablet Take 1 tablet by mouth every 8 hours as needed for Nausea 5/23/21   Regina Auguste DO   cyclobenzaprine (FLEXERIL) 5 MG tablet take 1 tablet by mouth twice a day if needed for muscle spasm 5/20/21   Sue Christine MD   pantoprazole (PROTONIX) 40 MG tablet Take 1 tablet by mouth daily Take 30 mins before breakfast daily 5/5/21   Ghada Fernández MD   BD PEN NEEDLE FESTUS 2ND GEN 32G X 4 MM MISC use three times a day 3/1/21   Historical Provider, MD   atorvastatin (LIPITOR) 20 MG tablet Take 1 tablet by mouth daily 4/22/21   Lillian Nguyen MD   lisinopril (PRINIVIL;ZESTRIL) 30 MG tablet Take 1 tablet by mouth daily 4/22/21   Lillian Nguyen MD   insulin glargine [facility]) 100 UNIT/ML injection pen Inject 20 Units into the skin nightly 4/22/21   Lillian Nguyen MD   Continuous Blood Gluc Sensor ([address] [place] [address]) MISC 1 each by Does not apply route continuous prn (blood glucose checks qHS and AC) 4/22/21   Lillian Nguyen MD   Continuous Blood Gluc Sensor (FREESTYLE KATIE 14 DAY SENSOR) MISC 1 each by Does not apply route continuous prn (blood glucose checks) 4/22/21   Lillian Nguyen MD   Alcohol Swabs (ALCOHOL PREP) 70 % PADS 1 box by Does not apply route 3 times daily (with meals) Use_____times per day  Diagnosis: 250.0   Diabetes ___Insulin-Dependent___Non-Insulin Dependent 3/1/21   Dionte Vyas MD   blood glucose monitor strips 1 strip by Other route 3 times daily (with meals) Test___times daily    Diagnosis: 250.0   Diabetes Mellitus____Insulin Dependent____Non-Insulin Dependent 3/1/21   Luz Seth MD   insulin aspart (NOVOLOG) 100 UNIT/ML injection pen Inject 5 Units into the skin 3 times daily (before meals)  Patient not taking: Reported on 4/22/2021 3/1/21   Luz Seth MD   Insulin Pen Needle (COMFORT EZ PEN NEEDLES) 32G X 6 MM MISC USE AS DIRECTED WITH INSULIN PENS (UP TO 3 TIMES DAILY) 3/1/21   Luz Seth MD   Lancets MISC 1 each by Does not apply route daily Use___times daily    Diagnisis:250.0  Diabetes Mellitus____Insulin Dependent___Non-Insulin Dependent 3/1/21   Luz Seth MD   acetaminophen (TYLENOL) 325 MG tablet Take 2 tablets by mouth every 6 hours as needed for Pain 11/10/20   Juan David Key, DO   ibuprofen (ADVIL;MOTRIN) 600 MG tablet Take 1 tablet by mouth every 6 hours as needed for Pain 11/10/20   Juan David Key, DO   Blood Pressure KIT 1 each by Does not apply route as needed (BP check) 9/11/20   Lorri Claros MD   glucose monitoring kit (FREESTYLE) monitoring kit 1 kit by Does not apply route daily 5/16/20   Carolyn Krause MD   albuterol sulfate HFA (PROAIR HFA) 108 (90 Base) MCG/ACT inhaler Inhale 2 puffs into the lungs every 4 hours as needed for Wheezing  Patient not taking: Reported on 4/22/2021 9/6/19   Laura Jauregui PA-C   .   Recent Surgical History: None = 0     Assessment     Peak Flow (asthma only)    Predicted: 0  Personal Best: 0  PEF 0  % Predicted 0  Peak Flow : not applicable = 0    ZAS5/CYW    FEV1 Predicted 0      FEV1 0    FEV1 % Predicted 0  FVC 0  IS volume 0  IBW 0    RR 18  Breath Sounds: Clear and slightly dim bilaterally      · Bronchodilator assessment at level  0  · Hyperinflation assessment at level   · Secretion Management assessment at level    ·   · []    Bronchodilator Assessment  BRONCHODILATOR ASSESSMENT SCORE  Score 0 1 2 3 4 5   Breath Sounds   [x]  Patient Baseline []  No Wheeze good aeration []  Faint, scattered wheezing, good aeration []  Expiratory Wheezing Normal Values          Age                                     Height in Feet and Inches       Age                                     Height in Feet and Inches       4' 11\" 5' 1\" 5' 3\" 5' 5\" 5' 7\" 5' 9\" 5' 11\" 6' 1\"  4' 11\" 5' 1\" 5' 3\" 5' 5\" 5' 7\" 5' 9\" 5' 11\" 6' 1\"   43 - 45 2.49 2.66 2.84 3.03 3.22 3.42 3.62 3.83 42 - 45 2.82 3.03 3.26 3.49 3.72 3.96 4.22 4.47   46 - 49 2.40 2.57 2.76 2.94 3.14 3.33 3.54 3.75 46 - 49 2.70 2.92 3.14 3.37 3.61 3.85 4.10 4.36   50 - 53 2.31 2.48 2.66 2.85 3.04 3.24 3.45 3.66 50 - 53 2.58 2.80 3.02 3.25 3.49 3.73 3.98 4.24   54 - 57 2.21 2.38 2.57 2.75 2.95 3.14 3.35 3.56 54 - 57 2.46 2.67 2.89 3.12 3.36 3.60 3.85 4.11   58 - 61 2.10 2.28 2.46 2.65 2.84 3.04 3.24 3.45 58 - 61 2.32 2.54 2.76 2.99 3.23 3.47 3.72 3.98   62 - 65 1.99 2.17 2.35 2.54 2.73 2.93 3.13 3.34 62 - 65 2.19 2.40 2.62 2.85 3.09 3.33 3.58 3.84   66 - 69 1.88 2.05 2.23 2.42 2.61 2.81 3.02 3.23 66 - 69 2.04 2.26 2.48 2.71 2.95 3.19 3.44 3.70   70+ 1.82 1.99 2.17 2.36 2.55 2.75 2.95 3.16 70+ 1.97 2.19 2.41 2.64 2.87 3.12 3.37 3.62             Predicted Peak Expiratory Flow Rate                                       Height (in)  Female       Height (in) Male           Age 59 57 63 57 62 67 73 79 Age            20 920 636 507 467 146 973 223 639  79 02 17 71 33 06 05 39 90   25 337 352 366 381 396 411 426 441 25 447 476 505 533 562 591 619 648 677   30 329 344 359 374 389 404 419 434 30 437 466 494 523 552 580 609 638 667   35 322 337 351 366 381 396 411 426 35 426 455 484 512 541 570 598 627 657   40 314 329 344 359 374 389 404 419 40 416 445 473 502 531 559 588 617 647   45 307 322 336 351 366 381 396 411 45 405 434 463 491 520 549 577 606 636   50 299 314 329 344 359 374 389 404 50 395 424 452 481 510 538 567 596 625   55 292 307 321 336 351 366 381 396 55 384 413 442 470 499 528 556 585 615   60 284 299 314 329 344 359 374 389 60 374 403 431 460 489 517 546 575 605   65 277 292 306 321 336 351 366 381 65 363 392 421 449 478 507 535 564 594   70 269 284 299 314 329 344 359 374 70 353 382 410 439 468 496 525 554 583   75 261 274 289 305 319 334 348 364 75 344 372 400 429 458 487 515 544 573   80 253 266 282 296 312 327 342 356 80 335 362 390 419 448 476 505 534 562     No hx of lung disease or resp med usage.  Will monitor

## 2021-05-25 NOTE — H&P
[facility]  CDU / [facility] NOTE     Pt Name: Joycelyn De Guzman  MRN: [id number]  [place] 1990  Date of evaluation: 5/25/21  Patient's PCP is :  Jesse Hair MD    [address] [place] [address]       Chief Complaint   Patient presents with    Chest Pain    Shortness of Breath    Cough    Emesis         HISTORY OF PRESENT ILLNESS    Joycelyn De Guzman is a 32 y.o. male who presents acute onset of viral symptoms, describing cough, shortness of breath, chest pain, posttussive emesis with some associated nausea. Patient arrived in the ED with 2 visits noted to be O2 saturation 91%, on first visit had symptomatic improvement. Second visit patient was worked up for pulmonary embolism and was noted to have a D-dimer of 0.40. My evaluation patient was resting comfortably in the bed, noted to be coughing with a slight wheeze. Patient states that his symptoms have improved. States that his cough is somewhat productive with white sputum. Patient denies any fever, chills, diarrhea constipation. Patient states his chest pain is improved. Patient states that he has a history of asthma, states that it was pediatric and has not experienced much issues since but was provided a prescription for an albuterol inhaler last year which she was unable to fill due to insurance issues. Location/Symptom: Diffuse chest  Timing/Onset: 2 days ago  Provocation: Soft  Quality: Ache  Radiation: None  Severity: Moderate  Timing/Duration: Improving, constant  Modifying Factors: Rest    REVIEW OF SYSTEMS       Review of Systems   Constitutional: Negative for chills and fever. HENT: Negative for congestion. Respiratory: Positive for cough, shortness of breath and wheezing. Negative for chest tightness. Cardiovascular: Positive for chest pain. Negative for leg swelling. Gastrointestinal: Negative for abdominal pain, constipation, diarrhea, nausea and vomiting. Endocrine: Negative for polyuria. Genitourinary: Negative for difficulty urinating. Skin: Negative for color change. Neurological: Negative for dizziness, weakness, light-headedness and headaches. Psychiatric/Behavioral: Negative for confusion. (PQRS) Advance directives on face sheet per hospital policy. No change unless specifically mentioned in chart    PAST MEDICAL HISTORY    has a past medical history of Diabetes mellitus ([facility] [address] HLD (hyperlipidemia), McArdle disease ([facility] [address] and Smoking. I have reviewed the past medical history with the patient and it is pertinent to this complaint. SURGICAL HISTORY      has no past surgical history on file. I have reviewed and agree with Surgical History entered and it is pertinent to this complaint. CURRENT MEDICATIONS     insulin lispro (HUMALOG) injection vial 0-12 Units, TID WC  insulin lispro (HUMALOG) injection vial 0-6 Units, Nightly  atorvastatin (LIPITOR) tablet 20 mg, Daily  insulin glargine (LANTUS) injection vial 20 Units, Nightly  lisinopril (PRINIVIL;ZESTRIL) tablet 30 mg, Daily  pantoprazole (PROTONIX) tablet 40 mg, Daily  sodium chloride flush 0.9 % injection 5-40 mL, 2 times per day  sodium chloride flush 0.9 % injection 5-40 mL, PRN  0.9 % sodium chloride infusion, PRN  enoxaparin (LOVENOX) injection 40 mg, Daily  acetaminophen (TYLENOL) tablet 650 mg, Q4H PRN  promethazine (PHENERGAN) injection 12.5 mg, Q6H PRN  glucose (GLUTOSE) 40 % oral gel 15 g, PRN  dextrose 50 % IV solution, PRN  glucagon (rDNA) injection 1 mg, PRN  dextrose 5 % solution, PRN        All medication charted and reviewed. ALLERGIES     has No Known Allergies. FAMILY HISTORY     He indicated that his mother is alive. He indicated that his father is . family history includes Diabetes in his father; Other in his father. The patient denies any pertinent family history. I have reviewed and agree with the family history entered.   I have reviewed the Family History and it is not significant to the case    SOCIAL HISTORY      reports that he has been smoking cigarettes. He has a 5.00 pack-year smoking history. He has never used smokeless tobacco. He reports previous drug use. Drug: Marijuana. He reports that he does not drink alcohol. I have reviewed and agree with all Social.  There are concerns for substance abuse/use. Patient counseled on smoking cessation, patient provided education and medical risks of tobacco use. PHYSICAL EXAM     INITIAL VITALS:  height is 5' 6\" (1.676 m) and weight is 180 lb (81.6 kg). His oral temperature is 97.2 °F (36.2 °C). His blood pressure is 177/81 (abnormal) and his pulse is 96. His respiration is 15 and oxygen saturation is 94%. Physical Exam  Constitutional:       Appearance: Normal appearance. HENT:      Head: Normocephalic and atraumatic. Mouth/Throat:      Mouth: Mucous membranes are moist.      Pharynx: Oropharynx is clear. Eyes:      Extraocular Movements: Extraocular movements intact. Conjunctiva/sclera: Conjunctivae normal.   Cardiovascular:      Rate and Rhythm: Normal rate and regular rhythm. Pulses: Normal pulses. Heart sounds: Normal heart sounds. No murmur heard. Pulmonary:      Effort: Pulmonary effort is normal.      Breath sounds: Wheezing (Expiratory) present. Abdominal:      General: Bowel sounds are normal. There is no distension. Tenderness: There is no abdominal tenderness. There is no guarding. Musculoskeletal:         General: Normal range of motion. Comments: Range of motion noted to be normal with patient's natural movements   Skin:     General: Skin is warm and dry. Findings: No rash (On exposed skin). Neurological:      General: No focal deficit present. Mental Status: He is alert and oriented to person, place, and time.    Psychiatric:         Mood and Affect: Mood normal.         Behavior: Behavior normal.           DIFFERENTIAL DIAGNOSIS/MDM:     Patient presenting with viral symptoms, most likely has a upper respiratory infection with a history of asthma    DIAGNOSTIC RESULTS     EKG: All EKG's are interpreted by the Observation Physician who either signs or Co-signs this chart in the absence of a cardiologist.    EKG Interpretation    May 24th 2021 at 0  Interpreted by observation physician    Rhythm: normal sinus   Rate: normal  Axis: normal  Ectopy: none  Conduction: normal  ST Segments: nonspecific changes  T Waves: normal  Q Waves: none    Clinical Impression: non-specific EKG    Domingo Mclean DO        RADIOLOGY:   I directly visualized the following  images and reviewed the radiologist interpretations:    XR CHEST PORTABLE    Result Date: 5/24/2021  EXAMINATION: ONE XRAY VIEW OF THE CHEST 5/24/2021 12:22 pm COMPARISON: 05/23/2021, 08/04/2020, 07/29/2020 HISTORY: ORDERING SYSTEM PROVIDED HISTORY: Chest pain TECHNOLOGIST PROVIDED HISTORY: Chest pain Reason for Exam: port upright FINDINGS: The lungs are clear . There is no effusion or pneumothorax . The cardiomediastinal silhouette is within normal limits . No acute bony findings . No acute pulmonary process. CT CHEST PULMONARY EMBOLISM W CONTRAST    Result Date: 5/24/2021  EXAMINATION: CTA OF THE CHEST 5/24/2021 8:03 pm TECHNIQUE: CTA of the chest was performed after the administration of intravenous contrast.  Multiplanar reformatted images are provided for review. MIP images are provided for review. Dose modulation, iterative reconstruction, and/or weight based adjustment of the mA/kV was utilized to reduce the radiation dose to as low as reasonably achievable.  COMPARISON: 03/13/2011 HISTORY: ORDERING SYSTEM PROVIDED HISTORY: SOB  Hypoxic TECHNOLOGIST PROVIDED HISTORY: [facility] Hypoxic Decision Support Exception - unselect if not a suspected or confirmed emergency medical condition->Emergency Medical Condition ([place]) Reason for Exam: SOB CP Hypoxic Acuity: Acute Type of Exam: Initial FINDINGS: Pulmonary Arteries: Less than optimal enhancement of pulmonary arteries decreases sensitivity for acute pulmonary emboli particularly of smaller emboli. Within this limitation no large central pulmonary embolus is evident. Pulmonary trunk normal size. Mediastinum: No evidence of mediastinal lymphadenopathy. The heart and pericardium demonstrate no acute abnormality. There is no acute abnormality of the thoracic aorta. Lungs/pleura: Minor right infrahilar scarring and some bronchiolectasis likely residual of remote infection. Previous study from 2011 shows some scattered infiltrates. Upper Abdomen: Limited images of the upper abdomen are unremarkable. Soft Tissues/Bones: No acute bone or soft tissue abnormality. Limited study. There is suboptimal enhancement of pulmonary arteries at time of scanning. This decreases sensitivity for particularly small emboli. Within this limitation no large central embolus. No evidence for pneumonia. LABS:  I have reviewed and interpreted all available lab results. Labs Reviewed   CBC WITH AUTO DIFFERENTIAL - Abnormal; Notable for the following components:       Result Value    WBC 12.4 (*)     RBC 4.19 (*)     Hematocrit 40.3 (*)     Seg Neutrophils 85 (*)     Lymphocytes 9 (*)     Segs Absolute 10.58 (*)     Absolute Lymph # 1.08 (*)     All other components within normal limits   COMPREHENSIVE METABOLIC PANEL W/ REFLEX TO MG FOR LOW K - Abnormal; Notable for the following components:    Glucose 311 (*)     CREATININE 1.27 (*)     Sodium 133 (*)     Chloride 97 (*)     All other components within normal limits   CK - Abnormal; Notable for the following components:     Total CK 1,279 (*)     All other components within normal limits   MYOGLOBIN, SERUM - Abnormal; Notable for the following components:    Myoglobin 145 (*)     All other components within normal limits   POC GLUCOSE FINGERSTICK - Abnormal; Notable for the following components:    POC Glucose 363 (*) All other components within normal limits   POC GLUCOSE FINGERSTICK - Abnormal; Notable for the following components:    POC Glucose 358 (*)     All other components within normal limits   COVID-19, RAPID   D-DIMER, QUANTITATIVE   POCT GLUCOSE   POCT GLUCOSE   POCT GLUCOSE       SCREENING TOOLS:    HEART Risk Score for Chest Pain Patients   History and Physical Exam Suspicion Level  (Nausea, Vomiting, Diaphoresis, Radiation, Exertion)   Slightly Suspicious (0 pts)   Moderately Suspicious (1 pt)   Highly Suspicious (2 pts)   EKG Interpretation   Normal (0 pts)   Non-Specific Repolarization Disturbance (1 pt)   Significant ST-Depression (2 pts)   Age of Patient (in years)   = 39 (0 pts)   46-64 (1 pt)   = 65 (2 pts)   Risk Factors   No Risk Factors (0 pts)   1-2 Risk Factors (1 pt)   = 3 Risk Factors (2 pts)   Risk Factors Include:   Hypercholesterolemia   Hypertension   Diabetes Mellitus   Cigarette smoking   Positive family history   Obesity   CAD   (SLE, CKDz, HIV, Cocaine abuse)   Troponin Levels   = Normal Limit (0 pts)   1-3 Times Normal Limit (1 pt)   > 3 Times Normal Limit (2 pts)  TOTAL:    Percent Risk for Major Adverse Cardiac Event (MACE)  0-3 pts indicates low risk for MACE   2.5% (DISCHARGE)   4-7 pts indicates moderate risk for MACE  20.3% (OBS)  8-10 pts indicates high risk for MACE  72.7% (EARLY INVASIVE TX)    CDU IMPRESSION / Jarad Trotter is a 32 y.o. male who presents with acute viral illness with wheezes    · Respiratory therapy evaluation and albuterol treatment  · Continue home medications and pain control  · Monitor vitals, labs, and imaging  · DISPO: pending consults and clinical improvement    CONSULTS:    IP CONSULT TO INTERNAL MEDICINE    PROCEDURES:  Not indicated       PATIENT REFERRED TO:    No follow-up provider specified. --  Ofelia Cates DO   Emergency Medicine Resident     This dictation was generated by voice recognition computer software.   Although all

## 2021-05-25 NOTE — PROGRESS NOTES
Pt states feeling very nausea with upper gastric pain. Also states he is having bilateral shoulder pain. Productive cough with thick yellow sputum. Pt states overall doesn't feel well.

## 2021-05-26 ENCOUNTER — TELEPHONE (OUTPATIENT)
Dept: FAMILY MEDICINE CLINIC | Age: 31
End: 2021-05-26

## 2021-05-26 RX ORDER — ONDANSETRON 4 MG/1
4 TABLET, ORALLY DISINTEGRATING ORAL EVERY 8 HOURS PRN
Qty: 20 TABLET | Refills: 0 | Status: SHIPPED | OUTPATIENT
Start: 2021-05-26 | End: 2021-08-30 | Stop reason: SDUPTHER

## 2021-05-26 NOTE — TELEPHONE ENCOUNTER
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

## 2021-06-11 ENCOUNTER — TELEPHONE (OUTPATIENT)
Dept: FAMILY MEDICINE CLINIC | Age: 31
End: 2021-06-11

## 2021-06-11 NOTE — TELEPHONE ENCOUNTER
Writer placed telephone call to patient in attempts to reschedule missed appoint on 06/10/21 with Dr. Hilda Maldonado. Voicemail message left to call office and reschedule. Message # 2.

## 2021-06-14 DIAGNOSIS — E10.9 TYPE 1 DIABETES MELLITUS WITHOUT COMPLICATION (HCC): ICD-10-CM

## 2021-06-14 RX ORDER — CYCLOBENZAPRINE HCL 5 MG
TABLET ORAL
Qty: 10 TABLET | Refills: 0 | Status: SHIPPED | OUTPATIENT
Start: 2021-06-14 | End: 2021-07-07

## 2021-06-14 NOTE — TELEPHONE ENCOUNTER
Flexeril pending for refill     Health Maintenance   Topic Date Due    Hepatitis C screen  Never done    Pneumococcal 0-64 years Vaccine (1 of 2 - PPSV23) Never done    Diabetic foot exam  Never done    Diabetic retinal exam  Never done    COVID-19 Vaccine (1) Never done    HIV screen  Never done    Hepatitis B vaccine (1 of 3 - Risk 3-dose series) Never done    DTaP/Tdap/Td vaccine (1 - Tdap) Never done    Lipid screen  05/29/2014    Diabetic microalbuminuria test  01/24/2019    Annual Wellness Visit (AWV)  Never done    A1C test (Diabetic or Prediabetic)  07/22/2021    Flu vaccine (Season Ended) 09/01/2021    Potassium monitoring  05/24/2022    Creatinine monitoring  05/24/2022    Hepatitis A vaccine  Aged Out    Hib vaccine  Aged Out    Meningococcal (ACWY) vaccine  Aged Out    Varicella vaccine  Discontinued             (applicable per patient's age: Cancer Screenings, Depression Screening, Fall Risk Screening, Immunizations)    Hemoglobin A1C (%)   Date Value   04/22/2021 10.2   03/01/2021 10.4   09/11/2020 11.7     Microalb/Crt.  Ratio (mcg/mg creat)   Date Value   01/24/2018 779 (H)     LDL Cholesterol (mg/dL)   Date Value   05/29/2013 109 (H)     AST (U/L)   Date Value   05/24/2021 28     ALT (U/L)   Date Value   05/24/2021 33     BUN (mg/dL)   Date Value   05/24/2021 19      (goal A1C is < 7)   (goal LDL is <100) need 30-50% reduction from baseline     BP Readings from Last 3 Encounters:   05/25/21 (!) 159/89   05/23/21 (!) 141/88   04/22/21 (!) 140/86    (goal /80)      All Future Testing planned in [facility]:  Lab Frequency Next Occurrence   Microalbumin, Ur Once 63/84/3353   Basic Metabolic Panel Once 39/02/6808   Basic Metabolic Panel Once 18/89/1919   Lipid Panel Once 04/22/2022   Hepatitis C Antibody Once 04/22/2022       Next Visit Date:  Future Appointments   Date Time Provider Ana Hooper   6/24/2021 10:45 AM Lillian Nguyen MD [facility]            Patient Active

## 2021-06-29 NOTE — PROGRESS NOTES
Subjective:    Breana Liao is a 27 y.o. male with  has a past medical history of Diabetes mellitus ([address] HLD (hyperlipidemia), McArdle disease ([address] and Smoking. Family History   Problem Relation Age of Onset    Diabetes Father     Other Father         PAD       Presented tothe office today for:  Chief Complaint   Patient presents with    Diabetes     here for dm follow up, needs A1C, due for eye/foot exam    Health Maintenance     has incomplete lab work, due for awv    Hypertension     has not taken bp medication for past 2 days, states he has been too busy        HPI    Pt is 27 y.o. AA M presenting for DM1 F/u    A1C today is 11.7  On basaglar 10 units nightly, SSI  No nocturia, fatigue currently  States daily BS upper 200s    Also found to have elevated BP  On lisinopril 10 mg daily  Denies any HA, Visual changes, CP, SOB    Review of Systems   Constitutional: Negative for chills, diaphoresis and fatigue. Eyes: Negative for visual disturbance. Respiratory: Negative for chest tightness and shortness of breath. Cardiovascular: Negative for chest pain and palpitations. Musculoskeletal: Negative for back pain. Neurological: Negative for weakness and headaches. Objective:    BP (!) 150/87   Pulse 89   Temp 98.2 °F (36.8 °C)   Wt 156 lb (70.8 kg)   SpO2 99%   BMI 25.18 kg/m²    BP Readings from Last 3 Encounters:   09/11/20 (!) 150/87   08/04/20 (!) 151/97   07/29/20 (!) 175/93     Physical Exam  Constitutional:       Appearance: Normal appearance. He is normal weight. Cardiovascular:      Rate and Rhythm: Normal rate and regular rhythm. Pulmonary:      Effort: Pulmonary effort is normal.      Breath sounds: Normal breath sounds. Musculoskeletal: Normal range of motion. Neurological:      Mental Status: He is alert.        Lab Results   Component Value Date    WBC 5.9 08/04/2020    HGB 13.0 08/04/2020    HCT 39.9 (L) 08/04/2020     08/04/2020    CHOL 172 05/29/2013 TRIG 58 2013    HDL 51 2013    ALT 24 2020    AST 26 2020     2020    K 4.1 2020     2020    CREATININE 1.19 2020    BUN 17 2020    CO2 25 2020    TSH 0.58 2013    INR 1.0 2018    LABA1C 11.7 2020    LABMICR 779 (H) 2018     Lab Results   Component Value Date    CALCIUM 9.2 2020    PHOS 3.3 2018     Lab Results   Component Value Date    LDLCHOLESTEROL 109 (H) 2013       Assessment and Plan:    1. Type 1 diabetes mellitus without complication (HCC)  Increase Long acting to 15 units  - AFL - [place], Perez, OD, Optometry, [facility]  - insulin glargine (BASAGLAR KWIKPEN) 100 UNIT/ML injection pen; Inject 15 Units into the skin nightly  Dispense: 5 pen; Refill: 0  - lisinopril (PRINIVIL;ZESTRIL) 20 MG tablet; Take 1 tablet by mouth daily  Dispense: 30 tablet; Refill: 1  - POCT glycosylated hemoglobin (Hb A1C)    2. Essential hypertension  Lisinopril to 20 mg  - Blood Pressure KIT; 1 each by Does not apply route as needed (BP check)  Dispense: 1 kit; Refill: 0  - lisinopril (PRINIVIL;ZESTRIL) 20 MG tablet; Take 1 tablet by mouth daily  Dispense: 30 tablet; Refill: 1    3. Gastroesophageal reflux disease without esophagitis  - pantoprazole (PROTONIX) 40 MG tablet; Take 1 tablet by mouth daily Take 30 mins before breakfast daily  Dispense: 30 tablet;  Refill: 1          Requested Prescriptions     Signed Prescriptions Disp Refills    Blood Pressure KIT 1 kit 0     Si each by Does not apply route as needed (BP check)    insulin glargine (BASAGLAR KWIKPEN) 100 UNIT/ML injection pen 5 pen 0     Sig: Inject 15 Units into the skin nightly    lisinopril (PRINIVIL;ZESTRIL) 20 MG tablet 30 tablet 1     Sig: Take 1 tablet by mouth daily    pantoprazole (PROTONIX) 40 MG tablet 30 tablet 1     Sig: Take 1 tablet by mouth daily Take 30 mins before breakfast daily       Medications Discontinued During This Encounter Medication Reason    insulin glargine (BASAGLAR KWIKPEN) 100 UNIT/ML injection pen REORDER    lisinopril (PRINIVIL;ZESTRIL) 10 MG tablet     pantoprazole (PROTONIX) 40 MG tablet REORDER       Return in about 4 weeks (around 10/9/2020) for DM II, repeat A1C. Vaibhav received counseling on the following healthy behaviors: medication adherence  Reviewed prior labs and health maintenance  Continue current medications, diet and exercise. Discussed use, benefit, and side effects of prescribed medications. Barriers to medication compliance addressed. Patient given educational materials - see patient instructions  Was a self-tracking handout given in paper form or via [employer]? No:    Requested Prescriptions     Signed Prescriptions Disp Refills    Blood Pressure KIT 1 kit 0     Si each by Does not apply route as needed (BP check)    insulin glargine (BASAGLAR KWIKPEN) 100 UNIT/ML injection pen 5 pen 0     Sig: Inject 15 Units into the skin nightly    lisinopril (PRINIVIL;ZESTRIL) 20 MG tablet 30 tablet 1     Sig: Take 1 tablet by mouth daily    pantoprazole (PROTONIX) 40 MG tablet 30 tablet 1     Sig: Take 1 tablet by mouth daily Take 30 mins before breakfast daily       All patient questions answered. Patient voiced understanding. Quality Measures    Body mass index is 25.18 kg/m². Normal. Weight control planned discussed Healthy diet and regular exercise. BP: (!) 150/87 Blood pressure is high. Treatment plan consists of Antihypertensive Medication Increased.     Lab Results   Component Value Date    LDLCHOLESTEROL 109 (H) 2013    (goal LDL reduction with dx if diabetes is 50% LDL reduction)      PHQ Scores 2020   PHQ2 Score 0 0   PHQ9 Score 0 0     Interpretation of Total Score Depression Severity: 1-4 = Minimal depression, 5-9 = Mild depression, 10-14 = Moderate depression, 15-19 = Moderately severe depression, 20-27 = Severe depression .

## 2021-07-01 ENCOUNTER — OFFICE VISIT (OUTPATIENT)
Dept: FAMILY MEDICINE CLINIC | Age: 31
End: 2021-07-01
Payer: COMMERCIAL

## 2021-07-01 VITALS
BODY MASS INDEX: 26.45 KG/M2 | TEMPERATURE: 97.5 F | WEIGHT: 164.6 LBS | DIASTOLIC BLOOD PRESSURE: 96 MMHG | HEART RATE: 92 BPM | SYSTOLIC BLOOD PRESSURE: 157 MMHG | HEIGHT: 66 IN

## 2021-07-01 DIAGNOSIS — E10.9 TYPE 1 DIABETES MELLITUS WITHOUT COMPLICATION (HCC): Primary | ICD-10-CM

## 2021-07-01 DIAGNOSIS — I10 ESSENTIAL HYPERTENSION: ICD-10-CM

## 2021-07-01 DIAGNOSIS — E74.04 MCARDLE DISEASE (HCC): ICD-10-CM

## 2021-07-01 LAB — HBA1C MFR BLD: 11.5 %

## 2021-07-01 PROCEDURE — 99213 OFFICE O/P EST LOW 20 MIN: CPT | Performed by: STUDENT IN AN ORGANIZED HEALTH CARE EDUCATION/TRAINING PROGRAM

## 2021-07-01 PROCEDURE — G8427 DOCREV CUR MEDS BY ELIG CLIN: HCPCS | Performed by: STUDENT IN AN ORGANIZED HEALTH CARE EDUCATION/TRAINING PROGRAM

## 2021-07-01 PROCEDURE — 83036 HEMOGLOBIN GLYCOSYLATED A1C: CPT | Performed by: STUDENT IN AN ORGANIZED HEALTH CARE EDUCATION/TRAINING PROGRAM

## 2021-07-01 PROCEDURE — 4004F PT TOBACCO SCREEN RCVD TLK: CPT | Performed by: STUDENT IN AN ORGANIZED HEALTH CARE EDUCATION/TRAINING PROGRAM

## 2021-07-01 PROCEDURE — G8419 CALC BMI OUT NRM PARAM NOF/U: HCPCS | Performed by: STUDENT IN AN ORGANIZED HEALTH CARE EDUCATION/TRAINING PROGRAM

## 2021-07-01 PROCEDURE — 3046F HEMOGLOBIN A1C LEVEL >9.0%: CPT | Performed by: STUDENT IN AN ORGANIZED HEALTH CARE EDUCATION/TRAINING PROGRAM

## 2021-07-01 PROCEDURE — 2022F DILAT RTA XM EVC RTNOPTHY: CPT | Performed by: STUDENT IN AN ORGANIZED HEALTH CARE EDUCATION/TRAINING PROGRAM

## 2021-07-01 RX ORDER — LISINOPRIL 40 MG/1
40 TABLET ORAL DAILY
Qty: 30 TABLET | Refills: 0 | Status: SHIPPED | OUTPATIENT
Start: 2021-07-01 | End: 2021-10-26 | Stop reason: SINTOL

## 2021-07-01 RX ORDER — INSULIN GLARGINE 100 [IU]/ML
20 INJECTION, SOLUTION SUBCUTANEOUS NIGHTLY
Qty: 5 PEN | Refills: 1 | Status: SHIPPED | OUTPATIENT
Start: 2021-07-01 | End: 2021-08-26 | Stop reason: SDUPTHER

## 2021-07-01 ASSESSMENT — ENCOUNTER SYMPTOMS
ABDOMINAL PAIN: 0
CHEST TIGHTNESS: 0
SHORTNESS OF BREATH: 0

## 2021-07-01 NOTE — PROGRESS NOTES
Diabetic visit information    BP Readings from Last 3 Encounters:   05/25/21 (!) 159/89   05/23/21 (!) 141/88   04/22/21 (!) 140/86       Hemoglobin A1C (%)   Date Value   04/22/2021 10.2   03/01/2021 10.4   09/11/2020 11.7     Microalb/Crt. Ratio (mcg/mg creat)   Date Value   01/24/2018 779 (H)     LDL Cholesterol (mg/dL)   Date Value   05/29/2013 109 (H)               Have you changed or started any medications since your last visit including any over-the-counter medicines, vitamins, or herbal medicines? no   Have you stopped taking any of your medications? Is so, why? -  no  Are you having any side effects from any of your medications? - no    Have you seen any other physician or provider since your last visit?  no  Have you had any other diagnostic tests since your last visit? yes - CT, CR, Labs, EKG   Have you been seen in the emergency room and/or had an admission in a hospital since we last saw you?  yes - [facility]    Have you had your annual diabetic retinal (eye) exam? No   (ensure copy of exam is in the chart)    Have you had your routine dental cleaning in the past 6 months? no    Do you have an active MyChart account? If not, what are your barriers? Yes    Patient Care Team:  Humaira Perkins MD as PCP - General (Family Medicine)  Charlie Bain MD as PCP - [address] [place] [address] [facility]    Medical history Review  Past Medical, Family, and Social History reviewed and does contribute to the patient presenting condition.     Health Maintenance   Topic Date Due    Hepatitis C screen  Never done    Pneumococcal 0-64 years Vaccine (1 of 2 - PPSV23) Never done    Diabetic foot exam  Never done    Diabetic retinal exam  Never done    COVID-19 Vaccine (1) Never done    HIV screen  Never done    Hepatitis B vaccine (1 of 3 - Risk 3-dose series) Never done    DTaP/Tdap/Td vaccine (1 - Tdap) Never done    Lipid screen  05/29/2014    Diabetic microalbuminuria test  01/24/2019    Annual Wellness Visit (AWV)  Never done    A1C test (Diabetic or Prediabetic)  07/22/2021    Flu vaccine (1) 09/01/2021    Potassium monitoring  05/24/2022    Creatinine monitoring  05/24/2022    Hepatitis A vaccine  Aged Out    Hib vaccine  Aged Out    Meningococcal (ACWY) vaccine  Aged Out    Varicella vaccine  Discontinued

## 2021-07-01 NOTE — PROGRESS NOTES
Attending Physician Statement  I have discussed the care of Renetta Meléndez 32 y.o. male, including pertinent history and exam findings, with the resident Dr. Sadia Rizo MD.    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 BP Readings from Last 3 Encounters:   07/01/21 (!) 157/96   05/25/21 (!) 159/89   05/23/21 (!) 141/88     BP (!) 157/96 (Site: Right Upper Arm, Position: Sitting, Cuff Size: Medium Adult)   Pulse 92   Temp 97.5 °F (36.4 °C) (Temporal)   Ht 5' 5.98\" (1.676 m)   Wt 164 lb 9.6 oz (74.7 kg)   BMI 26.58 kg/m²   Lab Results   Component Value Date    WBC 12.4 (H) 05/24/2021    HGB 13.3 05/24/2021    HCT 40.3 (L) 05/24/2021     05/24/2021    CHOL 172 05/29/2013    TRIG 58 05/29/2013    HDL 51 05/29/2013    ALT 33 05/24/2021    AST 28 05/24/2021     (L) 05/24/2021    K 4.0 05/24/2021    CL 97 (L) 05/24/2021    CREATININE 1.27 (H) 05/24/2021    BUN 19 05/24/2021    CO2 24 05/24/2021    TSH 0.58 08/14/2013    INR 1.0 03/23/2018    LABA1C 11.5 07/01/2021    LABMICR 779 (H) 01/24/2018     Lab Results   Component Value Date    LABPROT 7.2 10/11/2011    LABALBU 3.5 05/24/2021     No results found for: IRON, TIBC, FERRITIN  Lab Results   Component Value Date    LDLCHOLESTEROL 109 (H) 05/29/2013     I agree with the assessment, plan and the diagnosis of    Diagnosis Orders   1.  Type 1 diabetes mellitus without complication (HCC)  POCT glycosylated hemoglobin (Hb A1C)    lisinopril (PRINIVIL;ZESTRIL) 40 MG tablet    [facility] Diabetes Education - [address] Medication [facility] (Diabetes - Clinical Pharmacy) - [facility], [place]    insulin glargine [facility]) 100 UNIT/ML injection pen   2. 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 I agree with orders as documented by the resident. More than 25 minutes spent  in face to face encounter with the patient and more than half in counseling. Patient's questions were answered. Patient Voiced understanding to the counseling.   Return in about 4 weeks (around 7/29/2021) for HTN, DM.   (GC Modifier)-Dr. Tiffany Padilla MD

## 2021-07-01 NOTE — PATIENT INSTRUCTIONS
Thank you for letting us take care of you today. We hope all your questions were addressed. If a question was overlooked or something else comes to mind after you return home, please contact a member of your Care Team listed below. Your Care Team at [place] [address] is Team #2  Lucinda Tee DO (Faculty)  Roscoe Monroe (Faculty)  Jt Medina MD (Resident)  Luis Alberto Robles MD (Resident)  Tyesha Romero MD (Resident)  Dwight Gomez MD (Resident)  Oscar Retana MD (Resident)  HARINDER Callejas. ,ASHIA GARCIA [facility] [place] ([address] [facility] office)  Carole Stone, [address] (Clinical Practice Manager)  Viviana Gamez, [facility] (Clinical Pharmacist)     Office phone number: [phone]    If you need to get in right away due to illness, please be advised we have \"Same Day\" appointments available Monday-Friday. Please call us at [phone] option #3 to schedule your \"Same Day\" appointment.

## 2021-07-01 NOTE — PROGRESS NOTES
Subjective:    Marvin Jauregui is a 32 y.o. male with  has a past medical history of Diabetes mellitus ([address] HLD (hyperlipidemia), McArdle disease ([address] and Smoking. Family History   Problem Relation Age of Onset    Diabetes Father     Other Father         PAD       Presented tothe office today for:  Chief Complaint   Patient presents with    Diabetes     Follow up with A1C       HPI    HTN  BP uncontrolled; 157/96  Reports noncompliance with lisinopril 30 mg daily  Denies any HA, VC, CP, SOB, abd pain  Does not monitor home blood pressure daily    Type 1 diabetes  HbA1c: 11.5  Denies any fatigue, nocturia, polyphagia/polydipsia  Currently on 15 units of Lantus nightly, 10 units premeal    Review of Systems   Constitutional: Negative for chills, fatigue and fever. Eyes: Negative for visual disturbance. Respiratory: Negative for chest tightness and shortness of breath. Cardiovascular: Negative for chest pain and leg swelling. Gastrointestinal: Negative for abdominal pain. Neurological: Negative for dizziness, light-headedness and headaches. Objective:    BP (!) 157/96 (Site: Right Upper Arm, Position: Sitting, Cuff Size: Medium Adult)   Pulse 92   Temp 97.5 °F (36.4 °C) (Temporal)   Ht 5' 5.98\" (1.676 m)   Wt 164 lb 9.6 oz (74.7 kg)   BMI 26.58 kg/m²    BP Readings from Last 3 Encounters:   07/01/21 (!) 157/96   05/25/21 (!) 159/89   05/23/21 (!) 141/88     Physical Exam  Vitals and nursing note reviewed. Constitutional:       Appearance: Normal appearance. He is well-developed. HENT:      Head: Normocephalic and atraumatic. Cardiovascular:      Rate and Rhythm: Normal rate and regular rhythm. Heart sounds: Normal heart sounds. Pulmonary:      Effort: Pulmonary effort is normal. No respiratory distress. Breath sounds: Normal breath sounds. No wheezing. Skin:     General: Skin is warm and dry. Neurological:      Mental Status: He is alert.            Lab Results Component Value Date    WBC 12.4 (H) 05/24/2021    HGB 13.3 05/24/2021    HCT 40.3 (L) 05/24/2021     05/24/2021    CHOL 172 05/29/2013    TRIG 58 05/29/2013    HDL 51 05/29/2013    ALT 33 05/24/2021    AST 28 05/24/2021     (L) 05/24/2021    K 4.0 05/24/2021    CL 97 (L) 05/24/2021    CREATININE 1.27 (H) 05/24/2021    BUN 19 05/24/2021    CO2 24 05/24/2021    TSH 0.58 08/14/2013    INR 1.0 03/23/2018    LABA1C 11.5 07/01/2021    LABMICR 779 (H) 01/24/2018     Lab Results   Component Value Date    CALCIUM 8.7 05/24/2021    PHOS 3.2 02/22/2021     Lab Results   Component Value Date    LDLCHOLESTEROL 109 (H) 05/29/2013       Assessment and Plan:    1. Type 1 diabetes mellitus without complication (HCC)  - POCT glycosylated hemoglobin (Hb A1C): 11.5  Instructed patient to increase Lantus to 20 units nightly  Referral sent to diabetes education at [facility]  Referral to medication management for his diabetes [facility] pharmacist  - lisinopril (PRINIVIL;ZESTRIL) 40 MG tablet; Take 1 tablet by mouth daily  Dispense: 30 tablet; Refill: 0    2. Essential hypertension  Increase lisinopril to 40 mg  - lisinopril (PRINIVIL;ZESTRIL) 40 MG tablet; Take 1 tablet by mouth daily  Dispense: 30 tablet; Refill: 0    3. McArdle disease ([address]  Stable, does not report any muscular pain        Requested Prescriptions     Signed Prescriptions Disp Refills    lisinopril (PRINIVIL;ZESTRIL) 40 MG tablet 30 tablet 0     Sig: Take 1 tablet by mouth daily    insulin glargine (BASAGLAR KWIKPEN) 100 UNIT/ML injection pen 5 pen 1     Sig: Inject 20 Units into the skin nightly       Medications Discontinued During This Encounter   Medication Reason    lisinopril (PRINIVIL;ZESTRIL) 30 MG tablet     insulin glargine (BASAGLAR KWIKPEN) 100 UNIT/ML injection pen REORDER       Return in about 4 weeks (around 7/29/2021) for HTN, DM.

## 2021-07-26 DIAGNOSIS — E10.9 TYPE 1 DIABETES MELLITUS WITHOUT COMPLICATION (HCC): ICD-10-CM

## 2021-07-26 RX ORDER — CYCLOBENZAPRINE HCL 5 MG
TABLET ORAL
Qty: 10 TABLET | Refills: 0 | Status: SHIPPED | OUTPATIENT
Start: 2021-07-26 | End: 2021-08-09 | Stop reason: SDUPTHER

## 2021-07-26 NOTE — TELEPHONE ENCOUNTER
E-scribe request for cyclobenzaprine. Please review and e-scribe if applicable. Last Visit Date:  07/01/2021  Next Visit Date:  7/29/2021    Hemoglobin A1C (%)   Date Value   07/01/2021 11.5   04/22/2021 10.2   03/01/2021 10.4             ( goal A1C is < 7)   Microalb/Crt.  Ratio (mcg/mg creat)   Date Value   01/24/2018 779 (H)     LDL Cholesterol (mg/dL)   Date Value   05/29/2013 109 (H)       (goal LDL is <100)   AST (U/L)   Date Value   05/24/2021 28     ALT (U/L)   Date Value   05/24/2021 33     BUN (mg/dL)   Date Value   05/24/2021 19     BP Readings from Last 3 Encounters:   07/01/21 (!) 157/96   05/25/21 (!) 159/89   05/23/21 (!) 141/88          (goal 120/80)        Patient Active Problem List:     Type 1 diabetes mellitus without complication ([address]     Diabetic ketoacidosis without coma ([address]     Smoking     Hypomagnesemia     HLD (hyperlipidemia)     Ketosis due to diabetes (HCC)     Nausea     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      ----Maryana Reveal

## 2021-07-30 ENCOUNTER — TELEPHONE (OUTPATIENT)
Dept: FAMILY MEDICINE CLINIC | Age: 31
End: 2021-07-30

## 2021-08-02 NOTE — TELEPHONE ENCOUNTER
Writer placed telephone call to patient in attempts to reschedule missed appoint on 07/29/21 with Dr. My Jain. Voicemail message left to call office and reschedule. Message # 3. Letter sent.

## 2021-08-09 DIAGNOSIS — E10.9 TYPE 1 DIABETES MELLITUS WITHOUT COMPLICATION (HCC): ICD-10-CM

## 2021-08-09 RX ORDER — CYCLOBENZAPRINE HCL 5 MG
TABLET ORAL
Qty: 10 TABLET | Refills: 0 | Status: SHIPPED | OUTPATIENT
Start: 2021-08-09 | End: 2021-08-30 | Stop reason: SDUPTHER

## 2021-08-09 NOTE — TELEPHONE ENCOUNTER
Last visit : 07/01/21  Last refill : 07/26/21      Prescription is only for 10 please so patient has used those ten and is requesting more.

## 2021-08-25 DIAGNOSIS — E10.9 TYPE 1 DIABETES MELLITUS WITHOUT COMPLICATION (HCC): ICD-10-CM

## 2021-08-25 NOTE — TELEPHONE ENCOUNTER
E-scribe request for insulin glargine. Please review and e-scribe if applicable. Last Visit Date:  07/01/2021  Next Visit Date:  8/30/2021    Hemoglobin A1C (%)   Date Value   07/01/2021 11.5   04/22/2021 10.2   03/01/2021 10.4             ( goal A1C is < 7)   Microalb/Crt.  Ratio (mcg/mg creat)   Date Value   01/24/2018 779 (H)     LDL Cholesterol (mg/dL)   Date Value   05/29/2013 109 (H)       (goal LDL is <100)   AST (U/L)   Date Value   05/24/2021 28     ALT (U/L)   Date Value   05/24/2021 33     BUN (mg/dL)   Date Value   05/24/2021 19     BP Readings from Last 3 Encounters:   07/01/21 (!) 157/96   05/25/21 (!) 159/89   05/23/21 (!) 141/88          (goal 120/80)        Patient Active Problem List:     Type 1 diabetes mellitus without complication ([address]     Diabetic ketoacidosis without coma ([address]     Smoking     Hypomagnesemia     HLD (hyperlipidemia)     Ketosis due to diabetes (HCC)     Nausea     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      ----Sue Sensor

## 2021-08-26 RX ORDER — INSULIN GLARGINE 100 [IU]/ML
20 INJECTION, SOLUTION SUBCUTANEOUS NIGHTLY
Qty: 5 PEN | Refills: 1 | Status: SHIPPED | OUTPATIENT
Start: 2021-08-26 | End: 2021-08-30 | Stop reason: SDUPTHER

## 2021-08-30 ENCOUNTER — OFFICE VISIT (OUTPATIENT)
Dept: FAMILY MEDICINE CLINIC | Age: 31
End: 2021-08-30
Payer: COMMERCIAL

## 2021-08-30 VITALS
HEIGHT: 66 IN | BODY MASS INDEX: 25.43 KG/M2 | HEART RATE: 93 BPM | DIASTOLIC BLOOD PRESSURE: 80 MMHG | TEMPERATURE: 97.3 F | WEIGHT: 158.2 LBS | SYSTOLIC BLOOD PRESSURE: 151 MMHG

## 2021-08-30 DIAGNOSIS — E10.9 TYPE 1 DIABETES MELLITUS WITHOUT COMPLICATION (HCC): Primary | ICD-10-CM

## 2021-08-30 DIAGNOSIS — E78.2 MIXED HYPERLIPIDEMIA: ICD-10-CM

## 2021-08-30 DIAGNOSIS — I10 ESSENTIAL HYPERTENSION: ICD-10-CM

## 2021-08-30 LAB — HBA1C MFR BLD: 11.6 %

## 2021-08-30 PROCEDURE — 99213 OFFICE O/P EST LOW 20 MIN: CPT | Performed by: STUDENT IN AN ORGANIZED HEALTH CARE EDUCATION/TRAINING PROGRAM

## 2021-08-30 PROCEDURE — G8417 CALC BMI ABV UP PARAM F/U: HCPCS | Performed by: STUDENT IN AN ORGANIZED HEALTH CARE EDUCATION/TRAINING PROGRAM

## 2021-08-30 PROCEDURE — 4004F PT TOBACCO SCREEN RCVD TLK: CPT | Performed by: STUDENT IN AN ORGANIZED HEALTH CARE EDUCATION/TRAINING PROGRAM

## 2021-08-30 PROCEDURE — G8427 DOCREV CUR MEDS BY ELIG CLIN: HCPCS | Performed by: STUDENT IN AN ORGANIZED HEALTH CARE EDUCATION/TRAINING PROGRAM

## 2021-08-30 PROCEDURE — 83036 HEMOGLOBIN GLYCOSYLATED A1C: CPT | Performed by: STUDENT IN AN ORGANIZED HEALTH CARE EDUCATION/TRAINING PROGRAM

## 2021-08-30 PROCEDURE — 3046F HEMOGLOBIN A1C LEVEL >9.0%: CPT | Performed by: STUDENT IN AN ORGANIZED HEALTH CARE EDUCATION/TRAINING PROGRAM

## 2021-08-30 PROCEDURE — 2022F DILAT RTA XM EVC RTNOPTHY: CPT | Performed by: STUDENT IN AN ORGANIZED HEALTH CARE EDUCATION/TRAINING PROGRAM

## 2021-08-30 RX ORDER — INSULIN GLARGINE 100 [IU]/ML
25 INJECTION, SOLUTION SUBCUTANEOUS NIGHTLY
Qty: 5 PEN | Refills: 1 | Status: SHIPPED | OUTPATIENT
Start: 2021-08-30 | End: 2021-11-15

## 2021-08-30 RX ORDER — ONDANSETRON 4 MG/1
4 TABLET, ORALLY DISINTEGRATING ORAL EVERY 8 HOURS PRN
Qty: 20 TABLET | Refills: 0 | Status: SHIPPED | OUTPATIENT
Start: 2021-08-30 | End: 2022-06-13 | Stop reason: SDUPTHER

## 2021-08-30 RX ORDER — CYCLOBENZAPRINE HCL 5 MG
TABLET ORAL
Qty: 10 TABLET | Refills: 0 | Status: SHIPPED | OUTPATIENT
Start: 2021-08-30 | End: 2021-10-26 | Stop reason: SDUPTHER

## 2021-08-30 ASSESSMENT — ENCOUNTER SYMPTOMS
VOMITING: 0
SHORTNESS OF BREATH: 0
NAUSEA: 0
DIARRHEA: 0
ABDOMINAL PAIN: 0

## 2021-08-30 NOTE — PROGRESS NOTES
DIABETES and HYPERTENSION visit    BP Readings from Last 3 Encounters:   08/30/21 (!) 161/75   07/01/21 (!) 157/96   05/25/21 (!) 159/89        Hemoglobin A1C (%)   Date Value   07/01/2021 11.5   04/22/2021 10.2   03/01/2021 10.4     Microalb/Crt. Ratio (mcg/mg creat)   Date Value   01/24/2018 779 (H)     LDL Cholesterol (mg/dL)   Date Value   05/29/2013 109 (H)     HDL (mg/dL)   Date Value   05/29/2013 51     BUN (mg/dL)   Date Value   05/24/2021 19     CREATININE (mg/dL)   Date Value   05/24/2021 1.27 (H)     Glucose (mg/dL)   Date Value   05/24/2021 311 (H)   10/11/2011 182 (H)            Have you changed or started any medications since your last visit including any over-the-counter medicines, vitamins, or herbal medicines? no   Have you stopped taking any of your medications? Is so, why? -  yes - see list  Are you having any side effects from any of your medications? - no    Have you seen any other physician or provider since your last visit?  no   Have you had any other diagnostic tests since your last visit?  no   Have you been seen in the emergency room and/or had an admission in a hospital since we last saw you?  no   Have you had your routine dental cleaning in the past 6 months?  no     Have you had your annual diabetic retinal (eye) exam? No   (ensure copy of exam is in the chart)    Do you have an active MyChart account? If no, what is the barrier?   Yes    Patient Care Team:  Santana Raymundo MD as PCP - General (Emergency Medicine)  Herbert Sheffield DO as PCP - [facility]    Medical History Review  Past Medical, Family, and Social History reviewed and does not contribute to the patient presenting condition    Health Maintenance   Topic Date Due    Hepatitis C screen  Never done    Diabetic foot exam  Never done    Diabetic retinal exam  Never done    COVID-19 Vaccine (1) Never done    HIV screen  Never done    DTaP/Tdap/Td vaccine (1 - Tdap) Never done    Lipid screen

## 2021-08-30 NOTE — PATIENT INSTRUCTIONS
Thank you for letting us take care of you today. We hope all your questions were addressed. If a question was overlooked or something else comes to mind after you return home, please contact a member of your Care Team listed below. Your Care Team at [place] [address] is Team #3  Dustin Valencia MD (Faculty)  Lety Covarrubias MD (Faculty  Debrluis Berry MD (Resident)  Clarence Zendejas (Resident)   Scott Lockwood MD (Resident)  Reesa Cogan, LPN Roberta Eke., TERRY Mcnally., TERRY Robert ([address])  Adolfo Lackawaxen, [address] [facility] ([address] [facility])    Martha Rhodes, [facility] (Clinical Pharmacist)     Office phone number: [phone]    If you need to get in right away due to illness, please be advised we have \"Same Day\" appointments available Monday-Friday. Please call us at [phone] option #3 to schedule your \"Same Day\" appointment.

## 2021-08-30 NOTE — PROGRESS NOTES
Subjective:    Cecil Garcia is a 32 y.o. male with  has a past medical history of Diabetes mellitus ([facility] [address] HLD (hyperlipidemia), McArdle disease ([address] and Smoking. Family History   Problem Relation Age of Onset    Diabetes Father     Other Father         PAD       Presented tot office today for:  Chief Complaint   Patient presents with    Diabetes    Health Maintenance     declined covid, dtap, pneumo and hep b vaccine, no dm eye exam.     Discuss Medications     pen needle for insulin large one        HPI 32year old male with a past medical history of Type 1 Diabetes and hypertension who is presenting today for HTN and DM FU. Type 1 DM  - Last HbA1c 11.5 on 07/01/2021  - Compliant with 20 units of Lantus nightly and 5 Units pre meal twice daily  - States he tries to check blood glucose every day but does not  - States when he does check it is in the \"200-300\" range  - Denies any fatigue, polydipsia, abdominal pain, nausea, vomiting, dizziness  - Today, HbA1c is 11.6  - States he is compliant with insulin regimen    Hypertension  - BP today is 161/75  - On Lisniopril 40 mg daily  - States he misses his daily dose about 2-3 times per week  - Discussed the importance of compliance with patient, as well as benefits and risks associated with uncontrolled hypertension, patient verbalized understanding    Review of Systems   Constitutional: Negative for fever. HENT: Negative for congestion. Respiratory: Negative for shortness of breath. Cardiovascular: Negative for chest pain. Gastrointestinal: Negative for abdominal pain, diarrhea, nausea and vomiting. Endocrine: Negative for polyuria. Genitourinary: Negative for difficulty urinating. All other systems reviewed and are negative.     Objective:    BP (!) 151/80 (Site: Right Upper Arm, Position: Sitting, Cuff Size: Medium Adult) Comment: machine  Pulse 93   Temp 97.3 °F (36.3 °C) (Temporal)   Ht 5' 5.98\" (1.676 m)   Wt 158 lb 3.2 oz 0  - ondansetron (ZOFRAN ODT) 4 MG disintegrating tablet; Take 1 tablet by mouth every 8 hours as needed for Nausea  Dispense: 20 tablet; Refill: 0  - Will increase Lantus to 25 units nightly   - re check HbA1c in 1 month  - Discussed importance of compliance and checking blood glucose every morning and bringing a log to his next visit    2. Essential hypertension  - Continue Lisinopril 40 mg daily  - Discussed the importance of compliance with patient, as well as benefits and risks associated with uncontrolled hypertension, patient verbalized understanding    3. Mixed hyperlipidemia  - Lipid Panel      Requested Prescriptions     Signed Prescriptions Disp Refills    cyclobenzaprine (FLEXERIL) 5 MG tablet 10 tablet 0     Sig: take 1 tablet by mouth twice a day if needed for muscle spasm    ondansetron (ZOFRAN ODT) 4 MG disintegrating tablet 20 tablet 0     Sig: Take 1 tablet by mouth every 8 hours as needed for Nausea    insulin glargine (BASAGLAR KWIKPEN) 100 UNIT/ML injection pen 5 pen 1     Sig: Inject 25 Units into the skin nightly       Medications Discontinued During This Encounter   Medication Reason    ondansetron (ZOFRAN ODT) 4 MG disintegrating tablet REORDER    cyclobenzaprine (FLEXERIL) 5 MG tablet REORDER    insulin glargine (BASAGLAR KWIKPEN) 100 UNIT/ML injection pen REORDER       Return in about 4 weeks (around 9/27/2021) for DM, HTN FU.

## 2021-08-30 NOTE — PROGRESS NOTES
Attending Physician Statement  I have discussed the care of Gisela Rogers 32 y.o. male, including pertinent history and exam findings, with the resident Dr. Kierra Dubose MD.    History and Exam:   Chief Complaint   Patient presents with    Diabetes    Health Maintenance     declined covid, dtap, pneumo and hep b vaccine, no dm eye exam.    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 BP Readings from Last 3 Encounters:   08/30/21 (!) 151/80   07/01/21 (!) 157/96   05/25/21 (!) 159/89     BP (!) 151/80 (Site: Right Upper Arm, Position: Sitting, Cuff Size: Medium Adult) Comment: machine  Pulse 93   Temp 97.3 °F (36.3 °C) (Temporal)   Ht 5' 5.98\" (1.676 m)   Wt 158 lb 3.2 oz (71.8 kg)   BMI 25.55 kg/m²   Lab Results   Component Value Date    WBC 12.4 (H) 05/24/2021    HGB 13.3 05/24/2021    HCT 40.3 (L) 05/24/2021     05/24/2021    CHOL 172 05/29/2013    TRIG 58 05/29/2013    HDL 51 05/29/2013    ALT 33 05/24/2021    AST 28 05/24/2021     (L) 05/24/2021    K 4.0 05/24/2021    CL 97 (L) 05/24/2021    CREATININE 1.27 (H) 05/24/2021    BUN 19 05/24/2021    CO2 24 05/24/2021    TSH 0.58 08/14/2013    INR 1.0 03/23/2018    LABA1C 11.6 08/30/2021    LABMICR 779 (H) 01/24/2018     Lab Results   Component Value Date    LABPROT 7.2 10/11/2011    LABALBU 3.5 05/24/2021     No results found for: IRON, TIBC, FERRITIN  Lab Results   Component Value Date    LDLCHOLESTEROL 109 (H) 05/29/2013     I agree with the assessment, plan and the diagnosis of    Diagnosis Orders   1.  Type 1 diabetes mellitus without complication (HCC)  HM DIABETES FOOT EXAM    Microalbumin, Ur    POCT glycosylated hemoglobin (Hb A1C) cyclobenzaprine (FLEXERIL) 5 MG tablet    ondansetron (ZOFRAN ODT) 4 MG disintegrating tablet    insulin glargine (BASAGLAR KWIKPEN) 100 UNIT/ML injection pen   2. Essential hypertension     3. Mixed hyperlipidemia  Lipid Panel    . I agree with orders as documented by the resident. More than 25 minutes spent  in face to face encounter with the patient and more than half in counseling. Patient's questions were answered. Patient Voiced understanding to the counseling. Return in about 4 weeks (around 9/27/2021) for DM, HTN FU.    (GC Modifier)-Dr. Frankie Merino MD

## 2021-09-14 ENCOUNTER — HOSPITAL ENCOUNTER (EMERGENCY)
Age: 31
Discharge: LEFT BEFORE DISCHARGE | End: 2021-09-14
Payer: COMMERCIAL

## 2021-09-14 VITALS
DIASTOLIC BLOOD PRESSURE: 79 MMHG | OXYGEN SATURATION: 96 % | RESPIRATION RATE: 18 BRPM | TEMPERATURE: 97 F | SYSTOLIC BLOOD PRESSURE: 154 MMHG | HEART RATE: 83 BPM

## 2021-09-15 ENCOUNTER — HOSPITAL ENCOUNTER (EMERGENCY)
Age: 31
Discharge: LEFT BEFORE DISCHARGE | End: 2021-09-15
Payer: COMMERCIAL

## 2021-09-15 VITALS
SYSTOLIC BLOOD PRESSURE: 158 MMHG | HEART RATE: 79 BPM | HEIGHT: 66 IN | RESPIRATION RATE: 18 BRPM | WEIGHT: 154.4 LBS | OXYGEN SATURATION: 97 % | BODY MASS INDEX: 24.81 KG/M2 | TEMPERATURE: 98.8 F | DIASTOLIC BLOOD PRESSURE: 96 MMHG

## 2021-09-15 ASSESSMENT — PAIN SCALES - GENERAL: PAINLEVEL_OUTOF10: 8

## 2021-10-04 DIAGNOSIS — K21.9 GASTROESOPHAGEAL REFLUX DISEASE WITHOUT ESOPHAGITIS: ICD-10-CM

## 2021-10-05 RX ORDER — PANTOPRAZOLE SODIUM 40 MG/1
TABLET, DELAYED RELEASE ORAL
Qty: 90 TABLET | Refills: 3 | Status: SHIPPED | OUTPATIENT
Start: 2021-10-05

## 2021-10-05 NOTE — TELEPHONE ENCOUNTER
E-scribe request for pantoprazole. Please review and e-scribe if applicable. Last Visit Date:  08/30/2021  Next Visit Date:  10/26/2021    Hemoglobin A1C (%)   Date Value   08/30/2021 11.6   07/01/2021 11.5   04/22/2021 10.2             ( goal A1C is < 7)   Microalb/Crt.  Ratio (mcg/mg creat)   Date Value   01/24/2018 779 (H)     LDL Cholesterol (mg/dL)   Date Value   05/29/2013 109 (H)       (goal LDL is <100)   AST (U/L)   Date Value   05/24/2021 28     ALT (U/L)   Date Value   05/24/2021 33     BUN (mg/dL)   Date Value   05/24/2021 19     BP Readings from Last 3 Encounters:   08/30/21 (!) 151/80   07/01/21 (!) 157/96   05/25/21 (!) 159/89          (goal 120/80)        Patient Active Problem List:     Type 1 diabetes mellitus without complication ([address]     Diabetic ketoacidosis without coma ([address]     Smoking     Hypomagnesemia     HLD (hyperlipidemia)     Ketosis due to diabetes (HCC)     Nausea     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      ----Esaw Avon

## 2021-10-06 DIAGNOSIS — E10.9 TYPE 1 DIABETES MELLITUS WITHOUT COMPLICATION (HCC): ICD-10-CM

## 2021-10-06 RX ORDER — CYCLOBENZAPRINE HCL 5 MG
TABLET ORAL
Qty: 10 TABLET | Refills: 0 | OUTPATIENT
Start: 2021-10-06

## 2021-10-06 NOTE — TELEPHONE ENCOUNTER
Flexeril pending for refill     Health Maintenance   Topic Date Due    Hepatitis C screen  Never done    Pneumococcal 0-64 years Vaccine (1 of 2 - PPSV23) Never done    Diabetic retinal exam  Never done    COVID-19 Vaccine (1) Never done    HIV screen  Never done    DTaP/Tdap/Td vaccine (1 - Tdap) Never done    Lipid screen  05/29/2014    Diabetic microalbuminuria test  01/24/2019    Annual Wellness Visit (AWV)  Never done    Flu vaccine (1) Never done    Hepatitis B vaccine (1 of 3 - Risk 3-dose series) 07/01/2022 (Originally 5/1/2009)    A1C test (Diabetic or Prediabetic)  11/30/2021    Potassium monitoring  05/24/2022    Creatinine monitoring  05/24/2022    Diabetic foot exam  08/30/2022    Hepatitis A vaccine  Aged Out    Hib vaccine  Aged Out    Meningococcal (ACWY) vaccine  Aged Out    Varicella vaccine  Discontinued             (applicable per patient's age: Cancer Screenings, Depression Screening, Fall Risk Screening, Immunizations)    Hemoglobin A1C (%)   Date Value   08/30/2021 11.6   07/01/2021 11.5   04/22/2021 10.2     Microalb/Crt.  Ratio (mcg/mg creat)   Date Value   01/24/2018 779 (H)     LDL Cholesterol (mg/dL)   Date Value   05/29/2013 109 (H)     AST (U/L)   Date Value   05/24/2021 28     ALT (U/L)   Date Value   05/24/2021 33     BUN (mg/dL)   Date Value   05/24/2021 19      (goal A1C is < 7)   (goal LDL is <100) need 30-50% reduction from baseline     BP Readings from Last 3 Encounters:   08/30/21 (!) 151/80   07/01/21 (!) 157/96   05/25/21 (!) 159/89    (goal /80)      All Future Testing planned in [facility]:  Lab Frequency Next Occurrence   Basic Metabolic Panel Once 57/18/8213   Hepatitis C Antibody Once 04/22/2022   Microalbumin, Ur Once 09/29/2021   Lipid Panel Once 09/30/2021       Next Visit Date:  Future Appointments   Date Time Provider Ana Hooper   10/26/2021 10:30 AM Marianne Zuleta MD [facility]            Patient Active Problem List:     Type 1 diabetes mellitus without complication ([address]     Diabetic ketoacidosis without coma ([address]     Smoking     Hypomagnesemia     HLD (hyperlipidemia)     Ketosis due to diabetes (HCC)     Nausea     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

## 2021-10-26 ENCOUNTER — OFFICE VISIT (OUTPATIENT)
Dept: FAMILY MEDICINE CLINIC | Age: 31
End: 2021-10-26
Payer: COMMERCIAL

## 2021-10-26 VITALS
BODY MASS INDEX: 25.37 KG/M2 | DIASTOLIC BLOOD PRESSURE: 105 MMHG | HEART RATE: 83 BPM | WEIGHT: 157.2 LBS | SYSTOLIC BLOOD PRESSURE: 157 MMHG

## 2021-10-26 DIAGNOSIS — Z00.00 HEALTH MAINTENANCE EXAMINATION: ICD-10-CM

## 2021-10-26 DIAGNOSIS — Z72.89 OTHER PROBLEMS RELATED TO LIFESTYLE: ICD-10-CM

## 2021-10-26 DIAGNOSIS — E10.9 TYPE 1 DIABETES MELLITUS WITHOUT COMPLICATION (HCC): ICD-10-CM

## 2021-10-26 DIAGNOSIS — I10 ESSENTIAL HYPERTENSION: Primary | ICD-10-CM

## 2021-10-26 DIAGNOSIS — Z76.0 MEDICATION REFILL: ICD-10-CM

## 2021-10-26 DIAGNOSIS — Z00.00 ROUTINE GENERAL MEDICAL EXAMINATION AT A HEALTH CARE FACILITY: ICD-10-CM

## 2021-10-26 PROCEDURE — G8484 FLU IMMUNIZE NO ADMIN: HCPCS | Performed by: STUDENT IN AN ORGANIZED HEALTH CARE EDUCATION/TRAINING PROGRAM

## 2021-10-26 PROCEDURE — G0438 PPPS, INITIAL VISIT: HCPCS | Performed by: STUDENT IN AN ORGANIZED HEALTH CARE EDUCATION/TRAINING PROGRAM

## 2021-10-26 PROCEDURE — 3046F HEMOGLOBIN A1C LEVEL >9.0%: CPT | Performed by: STUDENT IN AN ORGANIZED HEALTH CARE EDUCATION/TRAINING PROGRAM

## 2021-10-26 RX ORDER — CYCLOBENZAPRINE HCL 5 MG
TABLET ORAL
Qty: 10 TABLET | Refills: 0 | Status: SHIPPED | OUTPATIENT
Start: 2021-10-26

## 2021-10-26 RX ORDER — LOSARTAN POTASSIUM 50 MG/1
50 TABLET ORAL DAILY
Qty: 30 TABLET | Refills: 3 | Status: SHIPPED | OUTPATIENT
Start: 2021-10-26 | End: 2021-11-15

## 2021-10-26 ASSESSMENT — LIFESTYLE VARIABLES
HOW MANY STANDARD DRINKS CONTAINING ALCOHOL DO YOU HAVE ON A TYPICAL DAY: 0
HOW OFTEN DURING THE LAST YEAR HAVE YOU BEEN UNABLE TO REMEMBER WHAT HAPPENED THE NIGHT BEFORE BECAUSE YOU HAD BEEN DRINKING: NEVER
HOW OFTEN DO YOU HAVE A DRINK CONTAINING ALCOHOL: MONTHLY OR LESS
HOW OFTEN DURING THE LAST YEAR HAVE YOU FAILED TO DO WHAT WAS NORMALLY EXPECTED FROM YOU BECAUSE OF DRINKING: 0
HOW OFTEN DURING THE LAST YEAR HAVE YOU HAD A FEELING OF GUILT OR REMORSE AFTER DRINKING: NEVER
HOW OFTEN DURING THE LAST YEAR HAVE YOU BEEN UNABLE TO REMEMBER WHAT HAPPENED THE NIGHT BEFORE BECAUSE YOU HAD BEEN DRINKING: 0
AUDIT-C TOTAL SCORE: 0
HOW OFTEN DURING THE LAST YEAR HAVE YOU NEEDED AN ALCOHOLIC DRINK FIRST THING IN THE MORNING TO GET YOURSELF GOING AFTER A NIGHT OF HEAVY DRINKING: 0
HOW OFTEN DURING THE LAST YEAR HAVE YOU HAD A FEELING OF GUILT OR REMORSE AFTER DRINKING: 0
HAS A RELATIVE, FRIEND, DOCTOR, OR ANOTHER HEALTH PROFESSIONAL EXPRESSED CONCERN ABOUT YOUR DRINKING OR SUGGESTED YOU CUT DOWN: NO
AUDIT TOTAL SCORE: 1
HOW OFTEN DURING THE LAST YEAR HAVE YOU FOUND THAT YOU WERE NOT ABLE TO STOP DRINKING ONCE YOU HAD STARTED: NEVER
HOW MANY STANDARD DRINKS CONTAINING ALCOHOL DO YOU HAVE ON A TYPICAL DAY: ONE OR TWO
AUDIT TOTAL SCORE: 0
HAVE YOU OR SOMEONE ELSE BEEN INJURED AS A RESULT OF YOUR DRINKING: NO
AUDIT-C TOTAL SCORE: 1
HAS A RELATIVE, FRIEND, DOCTOR, OR ANOTHER HEALTH PROFESSIONAL EXPRESSED CONCERN ABOUT YOUR DRINKING OR SUGGESTED YOU CUT DOWN: 0
HOW OFTEN DURING THE LAST YEAR HAVE YOU FAILED TO DO WHAT WAS NORMALLY EXPECTED FROM YOU BECAUSE OF DRINKING: NEVER
HOW OFTEN DURING THE LAST YEAR HAVE YOU NEEDED AN ALCOHOLIC DRINK FIRST THING IN THE MORNING TO GET YOURSELF GOING AFTER A NIGHT OF HEAVY DRINKING: NEVER
HOW OFTEN DURING THE LAST YEAR HAVE YOU FOUND THAT YOU WERE NOT ABLE TO STOP DRINKING ONCE YOU HAD STARTED: 0
HAVE YOU OR SOMEONE ELSE BEEN INJURED AS A RESULT OF YOUR DRINKING: 0
HOW OFTEN DO YOU HAVE SIX OR MORE DRINKS ON ONE OCCASION: 0
HOW OFTEN DO YOU HAVE A DRINK CONTAINING ALCOHOL: 1
HOW OFTEN DO YOU HAVE SIX OR MORE DRINKS ON ONE OCCASION: NEVER

## 2021-10-26 ASSESSMENT — PATIENT HEALTH QUESTIONNAIRE - PHQ9
SUM OF ALL RESPONSES TO PHQ QUESTIONS 1-9: 0
2. FEELING DOWN, DEPRESSED OR HOPELESS: 0
1. LITTLE INTEREST OR PLEASURE IN DOING THINGS: 0
SUM OF ALL RESPONSES TO PHQ QUESTIONS 1-9: 0
SUM OF ALL RESPONSES TO PHQ QUESTIONS 1-9: 0
SUM OF ALL RESPONSES TO PHQ9 QUESTIONS 1 & 2: 0

## 2021-10-26 NOTE — PATIENT INSTRUCTIONS
Thank you for letting us take care of you today. We hope all your questions were addressed. If a question was overlooked or something else comes to mind after you return home, please contact a member of your Care Team listed below. Your Care Team at [place] [address] is Team #3  Tri Rao MD (Faculty)  Starr Hernandez MD (Faculty  Herskeira Santana MD (Resident)  Leandra Joy (Resident)   Melody Alaniz MD (Resident)  Tito Prader, MD (Resident)  Phil Joyce., NEHEMIAH Zimmerman., RMNEHEMIAH  Macy Section., Alivia Mates., Pattnehemiah Lyonsal ([facility])  Marium Easley (Clinical Practice Manager)  Abi Cuellar, [address] [place] [address] (Clinical Pharmacist)     Office phone number: [phone]    If you need to get in right away due to illness, please be advised we have \"Same Day\" appointments available Monday-Friday. Please call us at [phone] option #3 to schedule your \"Same Day\" appointment. Personalized Preventive Plan for Shashank Akhtar - 10/26/2021  Medicare offers a range of preventive health benefits. Some of the tests and screenings are paid in full while other may be subject to a deductible, co-insurance, and/or copay. Some of these benefits include a comprehensive review of your medical history including lifestyle, illnesses that may run in your family, and various assessments and screenings as appropriate. After reviewing your medical record and screening and assessments performed today your provider may have ordered immunizations, labs, imaging, and/or referrals for you. A list of these orders (if applicable) as well as your Preventive Care list are included within your After Visit Summary for your review. Other Preventive Recommendations:    · A preventive eye exam performed by an eye specialist is recommended every 1-2 years to screen for glaucoma; cataracts, macular degeneration, and other eye disorders. · A preventive dental visit is recommended every 6 months.   · Try to get at least 150 minutes of exercise per week or 10,000 steps per day on a pedometer . · Order or download the FREE \"Exercise & Physical Activity: Your Everyday Guide\" from [employer]. Call 1-406.397.1035 or search [employer] online. · You need 4039-7140 mg of calcium and 4093-4895 IU of vitamin D per day. It is possible to meet your calcium requirement with diet alone, but a vitamin D supplement is usually necessary to meet this goal.  · When exposed to the sun, use a sunscreen that protects against both UVA and UVB radiation with an SPF of 30 or greater. Reapply every 2 to 3 hours or after sweating, drying off with a towel, or swimming. · Always wear a seat belt when traveling in a car. Always wear a helmet when riding a bicycle or motorcycle.

## 2021-10-26 NOTE — PROGRESS NOTES
Attending Physician Statement  I have discussed the care of Marshal Bocanegra 32 y.o. male, including pertinent history and exam findings, with the resident Dr. Jorge Israel MD.    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 BP Readings from Last 3 Encounters:   10/26/21 (!) 157/105   08/30/21 (!) 151/80   07/01/21 (!) 157/96     BP (!) 157/105   Pulse 83   Wt 157 lb 3.2 oz (71.3 kg)   BMI 25.37 kg/m²   Lab Results   Component Value Date    WBC 12.4 (H) 05/24/2021    HGB 13.3 05/24/2021    HCT 40.3 (L) 05/24/2021     05/24/2021    CHOL 172 05/29/2013    TRIG 58 05/29/2013    HDL 51 05/29/2013    ALT 33 05/24/2021    AST 28 05/24/2021     (L) 05/24/2021    K 4.0 05/24/2021    CL 97 (L) 05/24/2021    CREATININE 1.27 (H) 05/24/2021    BUN 19 05/24/2021    CO2 24 05/24/2021    TSH 0.58 08/14/2013    INR 1.0 03/23/2018    LABA1C 11.6 08/30/2021    LABMICR 779 (H) 01/24/2018     Lab Results   Component Value Date    LABPROT 7.2 10/11/2011    LABALBU 3.5 05/24/2021     No results found for: IRON, TIBC, FERRITIN  Lab Results   Component Value Date    LDLCHOLESTEROL 109 (H) 05/29/2013     I agree with the assessment, plan and the diagnosis of    Diagnosis Orders   1. Essential hypertension     2. Type 1 diabetes mellitus without complication (HCC)  Lipid Panel    Microalbumin, Ur    [facility] - Janet Del Toro MD, Ophthalmology, [place]   3. Health maintenance examination  HIV Screen    Hepatitis C Antibody    [place] HEP C SCREEN HIGH RISK/OTHER []   4. Other problems related to lifestyle  [place]I HEP C SCREEN HIGH RISK/OTHER []   5. Routine general medical examination at a health care facility     6.  Medication refill  cyclobenzaprine (FLEXERIL) 5 MG tablet    . I agree with orders as documented by the resident. More than 25 minutes spent  in face to face encounter with the patient and more than half in counseling. Patient's questions were answered. Patient Voiced understanding to the counseling. Return in 2 weeks (on 11/9/2021) for Medicare Annual Wellness Visit in 1 year.    (GC Modifier)-Dr. Bassem Estrada MD

## 2021-10-26 NOTE — PROGRESS NOTES
Medicare Annual Wellness Visit  Name: Samina Boykin Date: 10/26/2021   MRN: [id number] Sex: Male   Age: 32 y.o. Ethnicity: Non- / Non    : 1990 Race: Maddie Brothers / Rayo Spicer is here for Medicare AWV    Screenings for behavioral, psychosocial and functional/safety risks, and cognitive dysfunction are all negative except as indicated below. These results, as well as other patient data from the [address] form, are documented in Flowsheets linked to this Encounter. No Known Allergies    Prior to Visit Medications    Medication Sig Taking?  Authorizing Provider   pantoprazole (PROTONIX) 40 MG tablet take 1 tablet by mouth once daily 30 MINUTES before breakfast Yes Emily Brown MD   cyclobenzaprine (FLEXERIL) 5 MG tablet take 1 tablet by mouth twice a day if needed for muscle spasm Yes Carl Sharma MD   ondansetron (ZOFRAN ODT) 4 MG disintegrating tablet Take 1 tablet by mouth every 8 hours as needed for Nausea Yes Carl Sharma MD   insulin glargine (BASAGLAR KWIKPEN) 100 UNIT/ML injection pen Inject 25 Units into the skin nightly Yes Carl Sharma MD   lisinopril (PRINIVIL;ZESTRIL) 40 MG tablet Take 1 tablet by mouth daily Yes Wilma Saenz MD   albuterol sulfate HFA (PROAIR HFA) 108 (90 Base) MCG/ACT inhaler Inhale 2 puffs into the lungs every 6 hours as needed for Wheezing or Shortness of Breath Yes BESSIE Jameson - CNP   BD PEN NEEDLE FESTUS 2ND GEN 32G X 4 MM MISC use three times a day Yes Historical Provider, MD   atorvastatin (LIPITOR) 20 MG tablet Take 1 tablet by mouth daily Yes Wilma Sanez MD   Continuous Blood Gluc Sensor ([facility]) MISC 1 each by Does not apply route continuous prn (blood glucose checks qHS and AC) Yes Wilma Saenz MD   Continuous Blood Gluc Sensor (FREESTYLE KATIE 14 DAY SENSOR) MISC 1 each by Does not apply route continuous prn (blood glucose checks) Yes Wilma Saenz MD   Alcohol 9.6 oz (74.7 kg)     Vitals:    10/26/21 1055   BP: (!) 159/104   Site: Left Upper Arm   Position: Sitting   Cuff Size: Medium Adult   Pulse: 83   Weight: 157 lb 3.2 oz (71.3 kg)     Body mass index is 25.37 kg/m². Based upon direct observation of the patient, evaluation of cognition reveals recent and remote memory intact. General Appearance: alert and oriented to person, place and time, well developed and well- nourished, in no acute distress  Skin: warm and dry, no rash or erythema  Head: normocephalic and atraumatic  Eyes: pupils equal, round, and reactive to light, extraocular eye movements intact, conjunctivae normal  ENT: tympanic membrane, external ear and ear canal normal bilaterally, nose without deformity, nasal mucosa and turbinates normal without polyps  Neck: supple and non-tender without mass, no thyromegaly or thyroid nodules, no cervical lymphadenopathy  Pulmonary/Chest: clear to auscultation bilaterally- no wheezes, rales or rhonchi, normal air movement, no respiratory distress  Cardiovascular: normal rate, regular rhythm, normal S1 and S2, no murmurs, rubs, clicks, or gallops, distal pulses intact, no carotid bruits  Abdomen: soft, non-tender, non-distended, normal bowel sounds, no masses or organomegaly  Extremities: no cyanosis, clubbing or edema  Musculoskeletal: normal range of motion, no joint swelling, deformity or tenderness  Neurologic: reflexes normal and symmetric, no cranial nerve deficit, gait, coordination and speech normal    Patient's complete Health Risk Assessment and screening values have been reviewed and are found in Flowsheets. The following problems were reviewed today and where indicated follow up appointments were made and/or referrals ordered. Positive Risk Factor Screenings with Interventions:      Cognitive:   Words recalled: 0 Words Recalled  Clock Drawing Test (CDT) Score: (!) Abnormal  Total Score Interpretation: Positive Mini-Cog  Did the patient refuse to take the cognition test?: No  Cognitive Impairment Interventions:  ·        Substance History:  Social History     Tobacco History     Smoking Status  Current Every Day Smoker Last attempt to quit  7/31/2013 Smoking Frequency  0.5 packs/day for 10 years (5 pk yrs) Smoking Tobacco Type  Cigarettes    Smokeless Tobacco Use  Never Used          Alcohol History     Alcohol Use Status  No          Drug Use     Drug Use Status  Not Currently Types  Marijuana Comment  former marijuana          Sexual Activity     Sexually Active  Yes Partners  Female               Alcohol Screening: Audit-C Score: 1  Total Score: 1    A score of 8 or more is associated with harmful or hazardous drinking. A score of 13 or more in women, and 15 or more in men, is likely to indicate alcohol dependence. Substance Abuse Interventions:  · Tobacco abuse:  tobacco cessation tips and resources provided    General Health and ACP:  General  In general, how would you say your health is?: Good  In the past 7 days, have you experienced any of the following?  New or Increased Pain, New or Increased Fatigue, Loneliness, Social Isolation, Stress or Anger?: None of These  Do you get the social and emotional support that you need?: Yes  Do you have a Living Will?: (!) No  Advance Directives     Power of [address] [place] [address] Will ACP-Advance Directive ACP-Power of     Not on File Not on File Not on File Not on File      General Health Risk Interventions:  ·      Health Habits/Nutrition:  Health Habits/Nutrition  Do you exercise for at least 20 minutes 2-3 times per week?: Yes  Have you lost any weight without trying in the past 3 months?: (!) Yes  Do you eat only one meal per day?: No  Have you seen the dentist within the past year?: (!) No     Health Habits/Nutrition Interventions:  · Inadequate physical activity:  patient agrees to exercise for at least 150 minutes/week    Hearing/Vision:  No exam data present  Hearing/Vision  Do you or your family notice any trouble with your hearing that hasn't been managed with hearing aids?: No  Do you have difficulty driving, watching TV, or doing any of your daily activities because of your eyesight?: No  Have you had an eye exam within the past year?: (!) No  Hearing/Vision Interventions:  · Hearing concerns:  patient declines any further evaluation/treatment for hearing issues      Personalized Preventive Plan   Current Health Maintenance Status    There is no immunization history on file for this patient. Health Maintenance   Topic Date Due    Hepatitis C screen  Never done    Pneumococcal 0-64 years Vaccine (1 of 2 - PPSV23) Never done    Diabetic retinal exam  Never done    COVID-19 Vaccine (1) Never done    HIV screen  Never done    DTaP/Tdap/Td vaccine (1 - Tdap) Never done    Lipid screen  05/29/2014    Diabetic microalbuminuria test  01/24/2019    Flu vaccine (1) Never done    Hepatitis B vaccine (1 of 3 - Risk 3-dose series) 07/01/2022 (Originally 5/1/2009)    A1C test (Diabetic or Prediabetic)  11/30/2021    Potassium monitoring  05/24/2022    Creatinine monitoring  05/24/2022    Diabetic foot exam  08/30/2022    Hepatitis A vaccine  Aged Out    Hib vaccine  Aged Out    Meningococcal (ACWY) vaccine  Aged Out    Varicella vaccine  Discontinued     Recommendations for Advisity Due: see orders and patient instructions/AVS.  . Recommended screening schedule for the next 5-10 years is provided to the patient in written form: see Patient Katie Cardona was seen today for medicare aw.     Diagnoses and all orders for this visit:    Essential hypertension  -/105  -Patient states he ran out of his medication about half a month ago  -Completely asymptomatic  -We will discontinue lisinopril 40 mg once daily due to side effect of cough and start patient on Cozaar 50 mg once daily  -Benefits, side effects, and alternatives discussed with patient  -Patient voices understanding and is in agreement    Type 1 diabetes mellitus without complication (HCC)  -Last HbA1c 11.6  -Patient reports that he did not know he need to increase his home Lantus from 20 to 25 units nightly  -Instructed patient to increase his Lantus to 25 units nightly  -Continue NovoLog 5 units with meals  -Follow-up HbA1c in 1 month

## 2021-11-15 ENCOUNTER — OFFICE VISIT (OUTPATIENT)
Dept: FAMILY MEDICINE CLINIC | Age: 31
End: 2021-11-15
Payer: COMMERCIAL

## 2021-11-15 VITALS
WEIGHT: 162 LBS | DIASTOLIC BLOOD PRESSURE: 83 MMHG | SYSTOLIC BLOOD PRESSURE: 150 MMHG | BODY MASS INDEX: 26.03 KG/M2 | HEART RATE: 94 BPM | TEMPERATURE: 97.3 F | HEIGHT: 66 IN

## 2021-11-15 DIAGNOSIS — I10 ESSENTIAL HYPERTENSION: Primary | ICD-10-CM

## 2021-11-15 DIAGNOSIS — E10.9 TYPE 1 DIABETES MELLITUS WITHOUT COMPLICATION (HCC): ICD-10-CM

## 2021-11-15 LAB — HBA1C MFR BLD: 11.4 %

## 2021-11-15 PROCEDURE — 99213 OFFICE O/P EST LOW 20 MIN: CPT | Performed by: STUDENT IN AN ORGANIZED HEALTH CARE EDUCATION/TRAINING PROGRAM

## 2021-11-15 PROCEDURE — 4004F PT TOBACCO SCREEN RCVD TLK: CPT | Performed by: STUDENT IN AN ORGANIZED HEALTH CARE EDUCATION/TRAINING PROGRAM

## 2021-11-15 PROCEDURE — G8427 DOCREV CUR MEDS BY ELIG CLIN: HCPCS | Performed by: STUDENT IN AN ORGANIZED HEALTH CARE EDUCATION/TRAINING PROGRAM

## 2021-11-15 PROCEDURE — G8484 FLU IMMUNIZE NO ADMIN: HCPCS | Performed by: STUDENT IN AN ORGANIZED HEALTH CARE EDUCATION/TRAINING PROGRAM

## 2021-11-15 PROCEDURE — 3046F HEMOGLOBIN A1C LEVEL >9.0%: CPT | Performed by: STUDENT IN AN ORGANIZED HEALTH CARE EDUCATION/TRAINING PROGRAM

## 2021-11-15 PROCEDURE — 2022F DILAT RTA XM EVC RTNOPTHY: CPT | Performed by: STUDENT IN AN ORGANIZED HEALTH CARE EDUCATION/TRAINING PROGRAM

## 2021-11-15 PROCEDURE — 83036 HEMOGLOBIN GLYCOSYLATED A1C: CPT | Performed by: STUDENT IN AN ORGANIZED HEALTH CARE EDUCATION/TRAINING PROGRAM

## 2021-11-15 PROCEDURE — G8417 CALC BMI ABV UP PARAM F/U: HCPCS | Performed by: STUDENT IN AN ORGANIZED HEALTH CARE EDUCATION/TRAINING PROGRAM

## 2021-11-15 RX ORDER — LOSARTAN POTASSIUM 50 MG/1
100 TABLET ORAL DAILY
Qty: 60 TABLET | Refills: 3 | Status: CANCELLED | OUTPATIENT
Start: 2021-11-15

## 2021-11-15 RX ORDER — LOSARTAN POTASSIUM 50 MG/1
100 TABLET ORAL DAILY
Qty: 60 TABLET | Refills: 3 | Status: SHIPPED | OUTPATIENT
Start: 2021-11-15 | End: 2021-11-15

## 2021-11-15 RX ORDER — LOSARTAN POTASSIUM 100 MG/1
100 TABLET ORAL DAILY
Qty: 30 TABLET | Refills: 3 | Status: SHIPPED | OUTPATIENT
Start: 2021-11-15 | End: 2022-06-13 | Stop reason: SDUPTHER

## 2021-11-15 RX ORDER — INSULIN GLARGINE 100 [IU]/ML
30 INJECTION, SOLUTION SUBCUTANEOUS NIGHTLY
Qty: 5 PEN | Refills: 1 | Status: SHIPPED | OUTPATIENT
Start: 2021-11-15 | End: 2022-06-02

## 2021-11-15 ASSESSMENT — ENCOUNTER SYMPTOMS: SHORTNESS OF BREATH: 0

## 2021-11-15 NOTE — PROGRESS NOTES
Prediabetic)  11/30/2021    Potassium monitoring  05/24/2022    Creatinine monitoring  05/24/2022    Diabetic foot exam  08/30/2022    Hepatitis A vaccine  Aged Out    Hib vaccine  Aged Out    Meningococcal (ACWY) vaccine  Aged Out    Varicella vaccine  Discontinued

## 2021-11-15 NOTE — PATIENT INSTRUCTIONS
Thank you for letting us take care of you today. We hope all your questions were addressed. If a question was overlooked or something else comes to mind after you return home, please contact a member of your Care Team listed below. Your Care Team at [facility] [employer] is Team #3  Jose A Mcnally MD (Faculty)  Francis Moreno MD (Faculty  Sauladrianna Aguilar MD (Resident)  Norman Cm (Resident)   Kateryna Bledsoe MD (Resident)  Emily Quinones MD (Resident)  Neena Joya., TERRY Arauz., TERRY Ross., Eliceo Lion., Marlene Burr ([facility])  Ellen Galeana, [address] (Clinical Practice Manager)  Jojo Galeas [facility] (Clinical Pharmacist)     Office phone number: [phone]    If you need to get in right away due to illness, please be advised we have \"Same Day\" appointments available Monday-Friday. Please call us at [phone] option #3 to schedule your \"Same Day\" appointment.

## 2021-11-17 NOTE — PROGRESS NOTES
Attending Physician Statement    Wt Readings from Last 3 Encounters:   11/15/21 162 lb (73.5 kg)   10/26/21 157 lb 3.2 oz (71.3 kg)   08/30/21 158 lb 3.2 oz (71.8 kg)     Temp Readings from Last 3 Encounters:   11/15/21 97.3 °F (36.3 °C) (Temporal)   08/30/21 97.3 °F (36.3 °C) (Temporal)   07/01/21 97.5 °F (36.4 °C) (Temporal)     BP Readings from Last 3 Encounters:   11/15/21 (!) 150/83   10/26/21 (!) 157/105   08/30/21 (!) 151/80     Pulse Readings from Last 3 Encounters:   11/15/21 94   10/26/21 83   08/30/21 93         I have discussed the care of Mathieu Albert, including pertinent history and exam findings with the resident. I have reviewed the key elements of all parts of the encounter with the resident. I agree with the assessment, plan and orders as documented by the resident.   (GE Modifier)

## 2021-11-22 ENCOUNTER — TELEPHONE (OUTPATIENT)
Dept: FAMILY MEDICINE CLINIC | Age: 31
End: 2021-11-22

## 2021-11-22 NOTE — TELEPHONE ENCOUNTER
Medication Management Service  Ana Albert is a 32 y.o. male that was referred for a diabetes mellitus with Medication Management Service per referral from Dr. Claudia Saucedo for Patient Education/Consultation with A1c goal < 8 %.     09/21/2021: Unable to contact patient via telephone. Left message requesting returned phone call at [phone]    11/08/2021: Unable to contact patient via telephone. Unable to leave message. 11/22/2021: Letter sent. After 3 attempts to contact patient, referral will be closed. Medication Management will have no further follow-up at this time, but is available in the future for any further medication needs that may arise.     Merly Ji, Kimberley  PGY2 Ambulatory Care Pharmacy Resident    11/22/2021 2:19 PM    ===============================================================    For Pharmacy Admin Tracking Only     CPA in place:  No   Time Spent (min): 30

## 2022-02-25 DIAGNOSIS — E10.9 TYPE 1 DIABETES MELLITUS WITHOUT COMPLICATION (HCC): ICD-10-CM

## 2022-02-25 RX ORDER — INSULIN ASPART 100 [IU]/ML
INJECTION, SOLUTION INTRAVENOUS; SUBCUTANEOUS
Qty: 15 ML | Refills: 0 | Status: SHIPPED | OUTPATIENT
Start: 2022-02-25 | End: 2022-06-13 | Stop reason: SDUPTHER

## 2022-02-25 NOTE — TELEPHONE ENCOUNTER
E-scribe request for med refill. Please review and e-scribe if applicable. Last Visit Date:  11/15/2021  Next Visit Date:  Visit date not found    Hemoglobin A1C (%)   Date Value   11/15/2021 11.4   08/30/2021 11.6   07/01/2021 11.5             ( goal A1C is < 7)   Microalb/Crt.  Ratio (mcg/mg creat)   Date Value   01/24/2018 779 (H)     LDL Cholesterol (mg/dL)   Date Value   05/29/2013 109 (H)       (goal LDL is <100)   AST (U/L)   Date Value   05/24/2021 28     ALT (U/L)   Date Value   05/24/2021 33     BUN (mg/dL)   Date Value   05/24/2021 19     BP Readings from Last 3 Encounters:   11/15/21 (!) 150/83   10/26/21 (!) 157/105   08/30/21 (!) 151/80          (goal 120/80)        Patient Active Problem List:     Type 1 diabetes mellitus without complication ([address]     Diabetic ketoacidosis without coma ([address]     Smoking     Hypomagnesemia     HLD (hyperlipidemia)     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

## 2022-03-18 ENCOUNTER — TELEPHONE (OUTPATIENT)
Dept: FAMILY MEDICINE CLINIC | Age: 32
End: 2022-03-18

## 2022-06-02 ENCOUNTER — OFFICE VISIT (OUTPATIENT)
Dept: FAMILY MEDICINE CLINIC | Age: 32
End: 2022-06-02
Payer: COMMERCIAL

## 2022-06-02 ENCOUNTER — HOSPITAL ENCOUNTER (OUTPATIENT)
Age: 32
Setting detail: SPECIMEN
Discharge: HOME | End: 2022-06-02

## 2022-06-02 VITALS
WEIGHT: 198.8 LBS | HEART RATE: 93 BPM | HEIGHT: 66 IN | SYSTOLIC BLOOD PRESSURE: 138 MMHG | TEMPERATURE: 96.8 F | BODY MASS INDEX: 31.95 KG/M2 | OXYGEN SATURATION: 98 % | DIASTOLIC BLOOD PRESSURE: 86 MMHG

## 2022-06-02 DIAGNOSIS — Z11.4 ENCOUNTER FOR SCREENING FOR HIV: ICD-10-CM

## 2022-06-02 DIAGNOSIS — E78.2 MIXED HYPERLIPIDEMIA: ICD-10-CM

## 2022-06-02 DIAGNOSIS — Z11.59 NEED FOR HEPATITIS C SCREENING TEST: ICD-10-CM

## 2022-06-02 DIAGNOSIS — I10 ESSENTIAL HYPERTENSION: ICD-10-CM

## 2022-06-02 DIAGNOSIS — E10.9 TYPE 1 DIABETES MELLITUS WITHOUT COMPLICATION (HCC): Primary | ICD-10-CM

## 2022-06-02 DIAGNOSIS — E10.9 TYPE 1 DIABETES MELLITUS WITHOUT COMPLICATION (HCC): ICD-10-CM

## 2022-06-02 LAB
CHOLESTEROL/HDL RATIO: 3
CHOLESTEROL: 224 MG/DL
CREATININE URINE: 208.3 MG/DL (ref 39–259)
HBA1C MFR BLD: 9.7 %
HDLC SERPL-MCNC: 75 MG/DL
HEPATITIS C ANTIBODY: NONREACTIVE
HIV AG/AB: NONREACTIVE
LDL CHOLESTEROL: 129 MG/DL (ref 0–130)
MICROALBUMIN/CREAT 24H UR: 7352 MG/L
MICROALBUMIN/CREAT UR-RTO: 3530 MCG/MG CREAT
TRIGL SERPL-MCNC: 99 MG/DL

## 2022-06-02 PROCEDURE — 99213 OFFICE O/P EST LOW 20 MIN: CPT | Performed by: STUDENT IN AN ORGANIZED HEALTH CARE EDUCATION/TRAINING PROGRAM

## 2022-06-02 PROCEDURE — 2022F DILAT RTA XM EVC RTNOPTHY: CPT | Performed by: STUDENT IN AN ORGANIZED HEALTH CARE EDUCATION/TRAINING PROGRAM

## 2022-06-02 PROCEDURE — G8417 CALC BMI ABV UP PARAM F/U: HCPCS | Performed by: STUDENT IN AN ORGANIZED HEALTH CARE EDUCATION/TRAINING PROGRAM

## 2022-06-02 PROCEDURE — 4004F PT TOBACCO SCREEN RCVD TLK: CPT | Performed by: STUDENT IN AN ORGANIZED HEALTH CARE EDUCATION/TRAINING PROGRAM

## 2022-06-02 PROCEDURE — 3046F HEMOGLOBIN A1C LEVEL >9.0%: CPT | Performed by: STUDENT IN AN ORGANIZED HEALTH CARE EDUCATION/TRAINING PROGRAM

## 2022-06-02 PROCEDURE — G8427 DOCREV CUR MEDS BY ELIG CLIN: HCPCS | Performed by: STUDENT IN AN ORGANIZED HEALTH CARE EDUCATION/TRAINING PROGRAM

## 2022-06-02 PROCEDURE — 83036 HEMOGLOBIN GLYCOSYLATED A1C: CPT | Performed by: STUDENT IN AN ORGANIZED HEALTH CARE EDUCATION/TRAINING PROGRAM

## 2022-06-02 RX ORDER — INSULIN GLARGINE 100 [IU]/ML
INJECTION, SOLUTION SUBCUTANEOUS
Qty: 5 PEN | Refills: 3 | Status: SHIPPED | OUTPATIENT
Start: 2022-06-02

## 2022-06-02 ASSESSMENT — ENCOUNTER SYMPTOMS
SHORTNESS OF BREATH: 0
ABDOMINAL PAIN: 0

## 2022-06-02 NOTE — PROGRESS NOTES
Subjective:    Gorge Segal is a 28 y.o. male with  has a past medical history of Diabetes mellitus ([address] HLD (hyperlipidemia), McArdle disease ([address] and Smoking. Family History   Problem Relation Age of Onset    Diabetes Father     Other Father         PAD       Presented tot office today for:  Chief Complaint   Patient presents with    Diabetes     follow up on patients diabetes and hypertension     Medication Refill     Patient needs all med refills , all testing supplies for sugar testing        HPI 28year old male with past medical history of hypertension, type 1 diabetes and CKD who is here for follow up. Type 2 Diabetes  - Compliant with insulin  - Takes 30 units basaglar at night  - Takes 5 units lispro as needed? Sometimes after meals? - Complains of some hypoglycemic episodes with dizziness  - Hemoglobin A1c was 11.4 in 11/2021  - Today, HbA1c is 9.7   - Insulin was increased to 30 from 25 units about 7 months ago  - Having hypoglycemic episodes in the mornings with glucose around   - Works from [employer] [place]    Review of Systems   Constitutional: Negative for fever. Respiratory: Negative for shortness of breath. Gastrointestinal: Negative for abdominal pain. Neurological: Positive for dizziness. Negative for weakness. All other systems reviewed and are negative. Objective:    /86 (Site: Left Upper Arm, Position: Sitting, Cuff Size: Large Adult) Comment: manual  Pulse 93   Temp 96.8 °F (36 °C)   Ht 5' 5.98\" (1.676 m)   Wt 198 lb 12.8 oz (90.2 kg)   SpO2 98%   BMI 32.10 kg/m²    BP Readings from Last 3 Encounters:   06/02/22 138/86   11/15/21 (!) 150/83   10/26/21 (!) 157/105     Physical Exam  Vitals reviewed. Constitutional:       General: He is awake. Cardiovascular:      Rate and Rhythm: Normal rate and regular rhythm. Heart sounds: Normal heart sounds.    Pulmonary:      Effort: Pulmonary effort is normal.      Breath sounds: Normal breath sounds and air entry. Neurological:      Mental Status: He is alert. Psychiatric:         Behavior: Behavior is cooperative. Lab Results   Component Value Date    WBC 12.4 (H) 05/24/2021    HGB 13.3 05/24/2021    HCT 40.3 (L) 05/24/2021     05/24/2021    CHOL 172 05/29/2013    TRIG 58 05/29/2013    HDL 51 05/29/2013    ALT 33 05/24/2021    AST 28 05/24/2021     (L) 05/24/2021    K 4.0 05/24/2021    CL 97 (L) 05/24/2021    CREATININE 1.27 (H) 05/24/2021    BUN 19 05/24/2021    CO2 24 05/24/2021    TSH 0.58 08/14/2013    INR 1.0 03/23/2018    LABA1C 9.7 06/02/2022    LABMICR 779 (H) 01/24/2018     Lab Results   Component Value Date    CALCIUM 8.7 05/24/2021    PHOS 3.2 02/22/2021     Lab Results   Component Value Date    LDLCHOLESTEROL 109 (H) 05/29/2013       Assessment and Plan:    1. Type 1 diabetes mellitus without complication (HCC)  - POCT glycosylated hemoglobin (Hb A1C) - 9.7 DECREASED FROM 11.3  - Microalbumin, Ur; Future  - insulin glargine (BASAGLAR KWIKPEN) 100 UNIT/ML injection pen; TAKE 25 UNITS AT NIGHT AND 10 UNITS IN MORNING  Dispense: 5 pen; Refill: 3  - Patient having hypoglycemic episodes in mornings   - Will decrease nightly dose to 25 units  - Will add 10 units to use in the mornings  - Continue sliding scale with meals  - Patient counseled on diet   - Advised him to bring glucose logs to next visit in 1 month    2. Essential hypertension  - Well controlled  - Continue Cozaar 100 mg daily    3. Mixed hyperlipidemia  - Lipid Panel; Future    4. Encounter for screening for HIV  - HIV Screen; Future    5. Need for hepatitis C screening test  - Hepatitis C Antibody;  Future          Requested Prescriptions     Signed Prescriptions Disp Refills    insulin glargine (BASAGLAR KWIKPEN) 100 UNIT/ML injection pen 5 pen 3     Sig: TAKE 25 UNITS AT NIGHT AND 10 UNITS IN MORNING       Medications Discontinued During This Encounter   Medication Reason    insulin glargine (BASAGLAR KWIKPEN) 100 UNIT/ML injection pen     acetaminophen (TYLENOL) 325 MG tablet LIST CLEANUP    ibuprofen (ADVIL;MOTRIN) 600 MG tablet LIST CLEANUP       Return in about 4 weeks (around 6/30/2022) for FU DM.

## 2022-06-02 NOTE — PATIENT INSTRUCTIONS
Thank you for letting us take care of you today. We hope all your questions were addressed. If a question was overlooked or something else comes to mind after you return home, please contact a member of your Care Team listed below. Your Care Team at [place] [address] is Team #3  Merlinda Pickett, MD (Faculty)  Ankush Christianson MD (Faculty  Metshekhar Bennett MD (Resident)  Brendon Rios (Resident)   Stefano Barahona MD (Resident)  Ophelia Paul MD (Resident)  Carlos Baltazar., TERRY Singh., TERRY Gonzalez., Trish Haro., Toribio Vasques ([address] [place] [address])  Marium Dennis (Clinical Practice Manager)  Rizwan Randhawa [facility] (Clinical Pharmacist)     Office phone number: [phone]    If you need to get in right away due to illness, please be advised we have \"Same Day\" appointments available Monday-Friday. Please call us at [phone] option #3 to schedule your \"Same Day\" appointment.

## 2022-06-02 NOTE — PROGRESS NOTES
Diabetic visit information    BP Readings from Last 3 Encounters:   11/15/21 (!) 150/83   10/26/21 (!) 157/105   08/30/21 (!) 151/80       Hemoglobin A1C (%)   Date Value   11/15/2021 11.4   08/30/2021 11.6   07/01/2021 11.5     Microalb/Crt. Ratio (mcg/mg creat)   Date Value   01/24/2018 779 (H)     LDL Cholesterol (mg/dL)   Date Value   05/29/2013 109 (H)               Have you changed or started any medications since your last visit including any over-the-counter medicines, vitamins, or herbal medicines? no   Have you stopped taking any of your medications? Is so, why? -  no  Are you having any side effects from any of your medications? - no    Have you seen any other physician or provider since your last visit?  no   Have you had any other diagnostic tests since your last visit? yes - Labs   Have you been seen in the emergency room and/or had an admission in a hospital since we last saw you?  no     Have you had your annual diabetic retinal (eye) exam? No   (ensure copy of exam is in the chart)    Have you had your routine dental cleaning in the past 6 months? no    Do you have an active MyChart account? If not, what are your barriers? Yes    Patient Care Team:  Mya Jolly MD as PCP - General (Emergency Medicine)    Medical history Review  Past Medical, Family, and Social History reviewed and does not contribute to the patient presenting condition.     Health Maintenance   Topic Date Due    COVID-19 Vaccine (1) Never done    Pneumococcal 0-64 years Vaccine (1 - PCV) Never done    HIV screen  Never done    Diabetic retinal exam  Never done    Hepatitis C screen  Never done    Lipids  05/29/2014    DTaP/Tdap/Td vaccine (6 - Td or Tdap) 01/16/2017    Diabetic microalbuminuria test  01/24/2019    Hepatitis B vaccine (1 of 3 - Risk 3-dose series) 07/01/2022 (Originally 5/1/2009)    A1C test (Diabetic or Prediabetic)  07/14/2022    Diabetic foot exam  08/30/2022    Flu vaccine (Season Ended) 09/01/2022    Depression Screen  10/26/2022    Hib vaccine  Completed    Hepatitis A vaccine  Aged Out    Meningococcal (ACWY) vaccine  Aged Out    Varicella vaccine  Discontinued

## 2022-06-02 NOTE — PROGRESS NOTES
Attending Physician Statement  I  have discussed the care of Aime Gonzalez including pertinent history and exam findings with the resident. I agree with the assessment, plan and orders as documented by the resident. /86 (Site: Left Upper Arm, Position: Sitting, Cuff Size: Large Adult) Comment: manual  Pulse 93   Temp 96.8 °F (36 °C)   Ht 5' 5.98\" (1.676 m)   Wt 198 lb 12.8 oz (90.2 kg)   SpO2 98%   BMI 32.10 kg/m²    BP Readings from Last 3 Encounters:   06/02/22 138/86   11/15/21 (!) 150/83   10/26/21 (!) 157/105     Wt Readings from Last 3 Encounters:   06/02/22 198 lb 12.8 oz (90.2 kg)   11/15/21 162 lb (73.5 kg)   10/26/21 157 lb 3.2 oz (71.3 kg)          Diagnosis Orders   1. Type 1 diabetes mellitus without complication (HCC)  POCT glycosylated hemoglobin (Hb A1C)    Microalbumin, Ur    insulin glargine (BASAGLAR KWIKPEN) 100 UNIT/ML injection pen   2. Essential hypertension     3. Mixed hyperlipidemia  Lipid Panel   4. Encounter for screening for HIV  HIV Screen   5.  Need for hepatitis C screening test  Hepatitis C Antibody       Emmanuel Zeng DO 6/2/2022 12:03 PM

## 2022-06-13 DIAGNOSIS — I10 ESSENTIAL HYPERTENSION: ICD-10-CM

## 2022-06-13 DIAGNOSIS — E10.9 TYPE 1 DIABETES MELLITUS WITHOUT COMPLICATION (HCC): ICD-10-CM

## 2022-06-13 RX ORDER — INSULIN ASPART 100 [IU]/ML
INJECTION, SOLUTION INTRAVENOUS; SUBCUTANEOUS
Qty: 15 ML | Refills: 0 | Status: SHIPPED | OUTPATIENT
Start: 2022-06-13 | End: 2022-09-27

## 2022-06-13 RX ORDER — LOSARTAN POTASSIUM 100 MG/1
100 TABLET ORAL DAILY
Qty: 30 TABLET | Refills: 3 | Status: SHIPPED | OUTPATIENT
Start: 2022-06-13

## 2022-06-13 RX ORDER — ONDANSETRON 4 MG/1
4 TABLET, ORALLY DISINTEGRATING ORAL EVERY 8 HOURS PRN
Qty: 20 TABLET | Refills: 0 | Status: SHIPPED | OUTPATIENT
Start: 2022-06-13

## 2022-06-13 NOTE — TELEPHONE ENCOUNTER
Last visit: 6/2/22  Last Med refill: 3/2022  Does patient have enough medication for 72 hours: No:     Next Visit Date:  Future Appointments   Date Time Provider Ana Sandie   7/11/2022  3:45 PM Viry Valladares MD [address] [place] [address] Maintenance   Topic Date Due    COVID-19 Vaccine (1) Never done    Pneumococcal 0-64 years Vaccine (1 - PCV) Never done    Diabetic retinal exam  Never done    DTaP/Tdap/Td vaccine (6 - Td or Tdap) 01/16/2017    Hepatitis B vaccine (1 of 3 - Risk 3-dose series) 07/01/2022 (Originally 5/1/2009)    Diabetic foot exam  08/30/2022    Flu vaccine (Season Ended) 09/01/2022    A1C test (Diabetic or Prediabetic)  09/02/2022    Depression Screen  10/26/2022    Diabetic microalbuminuria test  06/02/2023    Lipids  06/02/2023    Hib vaccine  Completed    Hepatitis C screen  Completed    HIV screen  Completed    Hepatitis A vaccine  Aged Out    Meningococcal (ACWY) vaccine  Aged Out    Varicella vaccine  Discontinued       Hemoglobin A1C (%)   Date Value   06/02/2022 9.7   11/15/2021 11.4   08/30/2021 11.6             ( goal A1C is < 7)   Microalb/Crt.  Ratio (mcg/mg creat)   Date Value   06/02/2022 3,530 (H)     LDL Cholesterol (mg/dL)   Date Value   06/02/2022 129   05/29/2013 109 (H)       (goal LDL is <100)   AST (U/L)   Date Value   05/24/2021 28     ALT (U/L)   Date Value   05/24/2021 33     BUN (mg/dL)   Date Value   05/24/2021 19     BP Readings from Last 3 Encounters:   06/02/22 138/86   11/15/21 (!) 150/83   10/26/21 (!) 157/105          (goal 120/80)    All Future Testing planned in [facility]  Lab Frequency Next Occurrence               Patient Active Problem List:     Type 1 diabetes mellitus without complication (HCC)     Diabetic ketoacidosis without coma (HCC)     Smoking     Hypomagnesemia     HLD (hyperlipidemia)     Ketosis due to diabetes (HCC)     Nausea     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

## 2022-09-27 DIAGNOSIS — E10.9 TYPE 1 DIABETES MELLITUS WITHOUT COMPLICATION (HCC): ICD-10-CM

## 2022-09-27 RX ORDER — INSULIN ASPART 100 [IU]/ML
INJECTION, SOLUTION INTRAVENOUS; SUBCUTANEOUS
Qty: 15 ML | Refills: 0 | Status: SHIPPED | OUTPATIENT
Start: 2022-09-27

## 2022-09-27 NOTE — TELEPHONE ENCOUNTER
E-scribe request for med refill. Please review and e-scribe if applicable. Last Visit Date:  06/02/2022  Next Visit Date:  Visit date not found    Hemoglobin A1C (%)   Date Value   06/02/2022 9.7   11/15/2021 11.4   08/30/2021 11.6             ( goal A1C is < 7)   Microalb/Crt.  Ratio (mcg/mg creat)   Date Value   06/02/2022 3,530 (H)     LDL Cholesterol (mg/dL)   Date Value   06/02/2022 129       (goal LDL is <100)   AST (U/L)   Date Value   05/24/2021 28     ALT (U/L)   Date Value   05/24/2021 33     BUN (mg/dL)   Date Value   05/24/2021 19     BP Readings from Last 3 Encounters:   06/02/22 138/86   11/15/21 (!) 150/83   10/26/21 (!) 157/105          (goal 120/80)        Patient Active Problem List:     Type 1 diabetes mellitus without complication ([address]     Diabetic ketoacidosis without coma ([address]     Smoking     Hypomagnesemia     HLD (hyperlipidemia)     Ketosis due to diabetes (HCC)     Nausea     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

## 2023-03-02 DIAGNOSIS — E10.9 TYPE 1 DIABETES MELLITUS WITHOUT COMPLICATION (HCC): ICD-10-CM

## 2023-03-03 RX ORDER — INSULIN ASPART 100 [IU]/ML
INJECTION, SOLUTION INTRAVENOUS; SUBCUTANEOUS
Qty: 15 ML | Refills: 0 | Status: SHIPPED | OUTPATIENT
Start: 2023-03-03

## 2023-03-03 NOTE — TELEPHONE ENCOUNTER
Last visit: 06/02/22  Last Med refill: 09/27/22  Does patient have enough medication for 72 hours: No: LEFT MESSAGE FOR PATIENT TO CALL AND SCHEDULE APPOINTMENT. Next Visit Date:  No future appointments. Health Maintenance   Topic Date Due    COVID-19 Vaccine (1) Never done    Pneumococcal 0-64 years Vaccine (1 - PCV) Never done    Diabetic retinal exam  Never done    Hepatitis B vaccine (1 of 3 - Risk 3-dose series) Never done    DTaP/Tdap/Td vaccine (6 - Td or Tdap) 01/16/2017    GFR test (Diabetes, CKD 3-4, OR last GFR 15-59)  05/24/2022    Flu vaccine (1) Never done    Diabetic foot exam  08/30/2022    A1C test (Diabetic or Prediabetic)  09/02/2022    Depression Screen  10/26/2022    Diabetic Alb to Cr ratio (uACR) test  06/02/2023    Lipids  06/02/2023    Hib vaccine  Completed    Hepatitis C screen  Completed    HIV screen  Completed    Hepatitis A vaccine  Aged Out    Meningococcal (ACWY) vaccine  Aged Out    Varicella vaccine  Discontinued       Hemoglobin A1C (%)   Date Value   06/02/2022 9.7   11/15/2021 11.4   08/30/2021 11.6             ( goal A1C is < 7)   Microalb/Crt.  Ratio (mcg/mg creat)   Date Value   06/02/2022 3,530 (H)     LDL Cholesterol (mg/dL)   Date Value   06/02/2022 129   05/29/2013 109 (H)       (goal LDL is <100)   AST (U/L)   Date Value   05/24/2021 28     ALT (U/L)   Date Value   05/24/2021 33     BUN (mg/dL)   Date Value   05/24/2021 19     BP Readings from Last 3 Encounters:   06/02/22 138/86   11/15/21 (!) 150/83   10/26/21 (!) 157/105          (goal 120/80)    All Future Testing planned in [facility]  Lab Frequency Next Occurrence               Patient Active Problem List:     Type 1 diabetes mellitus without complication (HCC)     Diabetic ketoacidosis without coma (HCC)     Smoking     Hypomagnesemia     HLD (hyperlipidemia)     Ketosis due to diabetes (HCC)     Nausea     McArdle disease (HCC)     Gastroenteritis     Muscle spasm     Essential hypertension Gastroesophageal reflux disease without esophagitis     Hypoxia

## 2023-03-14 ENCOUNTER — HOSPITAL ENCOUNTER (OUTPATIENT)
Age: 33
Setting detail: SPECIMEN
Discharge: HOME | End: 2023-03-14

## 2023-03-14 ENCOUNTER — OFFICE VISIT (OUTPATIENT)
Dept: FAMILY MEDICINE CLINIC | Age: 33
End: 2023-03-14
Payer: COMMERCIAL

## 2023-03-14 VITALS
SYSTOLIC BLOOD PRESSURE: 147 MMHG | HEIGHT: 65 IN | WEIGHT: 155.2 LBS | BODY MASS INDEX: 25.86 KG/M2 | HEART RATE: 74 BPM | DIASTOLIC BLOOD PRESSURE: 95 MMHG

## 2023-03-14 DIAGNOSIS — E10.9 TYPE 1 DIABETES MELLITUS WITHOUT COMPLICATION (HCC): Primary | ICD-10-CM

## 2023-03-14 DIAGNOSIS — K21.9 GASTROESOPHAGEAL REFLUX DISEASE WITHOUT ESOPHAGITIS: ICD-10-CM

## 2023-03-14 DIAGNOSIS — I10 ESSENTIAL HYPERTENSION: ICD-10-CM

## 2023-03-14 DIAGNOSIS — Z00.00 HEALTHCARE MAINTENANCE: ICD-10-CM

## 2023-03-14 DIAGNOSIS — E10.9 TYPE 1 DIABETES MELLITUS WITHOUT COMPLICATION (HCC): ICD-10-CM

## 2023-03-14 LAB
ANION GAP SERPL CALCULATED.3IONS-SCNC: 12 MMOL/L (ref 9–17)
BUN SERPL-MCNC: 28 MG/DL (ref 6–20)
CALCIUM SERPL-MCNC: 9 MG/DL (ref 8.6–10.4)
CHLORIDE SERPL-SCNC: 101 MMOL/L (ref 98–107)
CO2 SERPL-SCNC: 22 MMOL/L (ref 20–31)
CREAT SERPL-MCNC: 2.31 MG/DL (ref 0.7–1.2)
GFR SERPL CREATININE-BSD FRML MDRD: 38 ML/MIN/1.73M2
GLUCOSE SERPL-MCNC: 129 MG/DL (ref 70–99)
HBA1C MFR BLD: 9.5 %
POTASSIUM SERPL-SCNC: 3.9 MMOL/L (ref 3.7–5.3)
SODIUM SERPL-SCNC: 135 MMOL/L (ref 135–144)

## 2023-03-14 PROCEDURE — 3074F SYST BP LT 130 MM HG: CPT | Performed by: STUDENT IN AN ORGANIZED HEALTH CARE EDUCATION/TRAINING PROGRAM

## 2023-03-14 PROCEDURE — 3046F HEMOGLOBIN A1C LEVEL >9.0%: CPT | Performed by: STUDENT IN AN ORGANIZED HEALTH CARE EDUCATION/TRAINING PROGRAM

## 2023-03-14 PROCEDURE — G8427 DOCREV CUR MEDS BY ELIG CLIN: HCPCS | Performed by: STUDENT IN AN ORGANIZED HEALTH CARE EDUCATION/TRAINING PROGRAM

## 2023-03-14 PROCEDURE — 3078F DIAST BP <80 MM HG: CPT | Performed by: STUDENT IN AN ORGANIZED HEALTH CARE EDUCATION/TRAINING PROGRAM

## 2023-03-14 PROCEDURE — 83036 HEMOGLOBIN GLYCOSYLATED A1C: CPT | Performed by: STUDENT IN AN ORGANIZED HEALTH CARE EDUCATION/TRAINING PROGRAM

## 2023-03-14 PROCEDURE — G8417 CALC BMI ABV UP PARAM F/U: HCPCS | Performed by: STUDENT IN AN ORGANIZED HEALTH CARE EDUCATION/TRAINING PROGRAM

## 2023-03-14 PROCEDURE — G8484 FLU IMMUNIZE NO ADMIN: HCPCS | Performed by: STUDENT IN AN ORGANIZED HEALTH CARE EDUCATION/TRAINING PROGRAM

## 2023-03-14 PROCEDURE — 2022F DILAT RTA XM EVC RTNOPTHY: CPT | Performed by: STUDENT IN AN ORGANIZED HEALTH CARE EDUCATION/TRAINING PROGRAM

## 2023-03-14 PROCEDURE — 4004F PT TOBACCO SCREEN RCVD TLK: CPT | Performed by: STUDENT IN AN ORGANIZED HEALTH CARE EDUCATION/TRAINING PROGRAM

## 2023-03-14 PROCEDURE — 99213 OFFICE O/P EST LOW 20 MIN: CPT | Performed by: STUDENT IN AN ORGANIZED HEALTH CARE EDUCATION/TRAINING PROGRAM

## 2023-03-14 RX ORDER — PANTOPRAZOLE SODIUM 40 MG/1
TABLET, DELAYED RELEASE ORAL
Qty: 90 TABLET | Refills: 3 | Status: SHIPPED | OUTPATIENT
Start: 2023-03-14

## 2023-03-14 RX ORDER — ATORVASTATIN CALCIUM 20 MG/1
20 TABLET, FILM COATED ORAL DAILY
Qty: 30 TABLET | Refills: 3 | Status: SHIPPED | OUTPATIENT
Start: 2023-03-14

## 2023-03-14 RX ORDER — LOSARTAN POTASSIUM 50 MG/1
50 TABLET ORAL DAILY
Qty: 30 TABLET | Refills: 3 | Status: SHIPPED | OUTPATIENT
Start: 2023-03-14

## 2023-03-14 RX ORDER — INSULIN GLARGINE 100 [IU]/ML
INJECTION, SOLUTION SUBCUTANEOUS
Qty: 15 ML | Refills: 3 | Status: SHIPPED | OUTPATIENT
Start: 2023-03-14

## 2023-03-14 RX ORDER — FLASH GLUCOSE SENSOR
1 KIT MISCELLANEOUS CONTINUOUS
Qty: 1 EACH | Refills: 0 | Status: SHIPPED | OUTPATIENT
Start: 2023-03-14

## 2023-03-14 RX ORDER — LOSARTAN POTASSIUM 100 MG/1
100 TABLET ORAL DAILY
Qty: 30 TABLET | Refills: 3 | Status: SHIPPED | OUTPATIENT
Start: 2023-03-14 | End: 2023-03-14

## 2023-03-14 SDOH — ECONOMIC STABILITY: FOOD INSECURITY: WITHIN THE PAST 12 MONTHS, YOU WORRIED THAT YOUR FOOD WOULD RUN OUT BEFORE YOU GOT MONEY TO BUY MORE.: NEVER TRUE

## 2023-03-14 SDOH — ECONOMIC STABILITY: HOUSING INSECURITY
IN THE LAST 12 MONTHS, WAS THERE A TIME WHEN YOU DID NOT HAVE A STEADY PLACE TO SLEEP OR SLEPT IN A SHELTER (INCLUDING NOW)?: NO

## 2023-03-14 SDOH — ECONOMIC STABILITY: FOOD INSECURITY: WITHIN THE PAST 12 MONTHS, THE FOOD YOU BOUGHT JUST DIDN'T LAST AND YOU DIDN'T HAVE MONEY TO GET MORE.: NEVER TRUE

## 2023-03-14 SDOH — ECONOMIC STABILITY: INCOME INSECURITY: HOW HARD IS IT FOR YOU TO PAY FOR THE VERY BASICS LIKE FOOD, HOUSING, MEDICAL CARE, AND HEATING?: NOT HARD AT ALL

## 2023-03-14 ASSESSMENT — PATIENT HEALTH QUESTIONNAIRE - PHQ9
SUM OF ALL RESPONSES TO PHQ QUESTIONS 1-9: 0
1. LITTLE INTEREST OR PLEASURE IN DOING THINGS: 0
SUM OF ALL RESPONSES TO PHQ9 QUESTIONS 1 & 2: 0
SUM OF ALL RESPONSES TO PHQ QUESTIONS 1-9: 0
2. FEELING DOWN, DEPRESSED OR HOPELESS: 0

## 2023-03-14 NOTE — PATIENT INSTRUCTIONS
Thank you for letting us take care of you today. We hope all your questions were addressed. If a question was overlooked or something else comes to mind after you return home, please contact a member of your Care Team listed below. Your Care Team at [place] [id number] is Team #5  Natty Villanueva MD (Faculty)  Ramon Melchor MD (Faculty)  Paty Moore MD (Resident)  Jennifer Tijerina MD (Resident)  Hermann Dandy, MD (Resident)  Manolo Buchanan MD, (Resident)  Ant Rivas., CARSON Jones, AMIE Marshall.,  HARINDER South., Lord Serrano., [facility] office)  Shaq John, [facility] [facility] [address] (Clinical Practice Manager)  Lori Herrera [facility] (Clinical Pharmacist)       Office phone number: [phone]    If you need to get in right away due to illness, please be advised we have \"Same Day\" appointments available Monday-Friday. Please call us at [phone] option #3 to schedule your \"Same Day\" appointment.

## 2023-03-17 ASSESSMENT — ENCOUNTER SYMPTOMS
SHORTNESS OF BREATH: 0
ABDOMINAL PAIN: 0

## 2023-03-21 NOTE — PROGRESS NOTES
Attending Physician Statement    Wt Readings from Last 3 Encounters:   03/14/23 155 lb 3.2 oz (70.4 kg)   06/02/22 198 lb 12.8 oz (90.2 kg)   11/15/21 162 lb (73.5 kg)     Temp Readings from Last 3 Encounters:   06/02/22 96.8 °F (36 °C)   11/15/21 97.3 °F (36.3 °C) (Temporal)   08/30/21 97.3 °F (36.3 °C) (Temporal)     BP Readings from Last 3 Encounters:   03/14/23 (!) 147/95   06/02/22 138/86   11/15/21 (!) 150/83     Pulse Readings from Last 3 Encounters:   03/14/23 74   06/02/22 93   11/15/21 94         I have discussed the care of Madelin Allen, including pertinent history and exam findings with the resident. I have reviewed the key elements of all parts of the encounter with the resident. I agree with the assessment, plan and orders as documented by the resident.   (GE Modifier)
Visit Information    Have you changed or started any medications since your last visit including any over-the-counter medicines, vitamins, or herbal medicines? no   Are you having any side effects from any of your medications? -  no  Have you stopped taking any of your medications? Is so, why? -  no    Have you seen any other physician or provider since your last visit? No  Have you had any other diagnostic tests since your last visit? No  Have you been seen in the emergency room and/or had an admission to a hospital since we last saw you? No  Have you had your routine dental cleaning in the past 6 months? no    Have you activated your [employer] account? If not, what are your barriers?  Yes     Patient Care Team:  Kelly Harvey MD as PCP - General (Emergency Medicine)    Medical History Review  Past Medical, Family, and Social History reviewed and does not contribute to the patient presenting condition    Health Maintenance   Topic Date Due    COVID-19 Vaccine (1) Never done    Pneumococcal 0-64 years Vaccine (1 - PCV) Never done    Diabetic retinal exam  Never done    Hepatitis B vaccine (1 of 3 - Risk 3-dose series) Never done    DTaP/Tdap/Td vaccine (6 - Td or Tdap) 01/16/2017    GFR test (Diabetes, CKD 3-4, OR last GFR 15-59)  05/24/2022    Flu vaccine (1) Never done    Diabetic foot exam  08/30/2022    Depression Screen  10/26/2022    A1C test (Diabetic or Prediabetic)  01/30/2023    Lipids  06/02/2023    Diabetic Alb to Cr ratio (uACR) test  10/29/2023    Hib vaccine  Completed    Hepatitis C screen  Completed    HIV screen  Completed    Hepatitis A vaccine  Aged Out    Meningococcal (ACWY) vaccine  Aged Out    Varicella vaccine  Discontinued
the patient's chart, given he is requiring such a low amount of insulin and still becoming hypoglycemic, will order these to confirm if the patient has type I or type 2 diabetes. - POCT glycosylated hemoglobin (Hb A1C) - 9.5  - Continuous Blood Gluc Sensor (FREESTYLE KATIE 14 DAY SENSOR) MISC; 1 each by Does not apply route continuous  Dispense: 1 each; Refill: 0  - Continuous Blood Gluc  (FREESTYLE KATIE 2 READER) ZANE; 1 each by Does not apply route continuous  Dispense: 1 each; Refill: 0  - insulin glargine (BASAGLAR KWIKPEN) 100 UNIT/ML injection pen; TAKE 20 UNITS AT NIGHT AND 5 UNITS IN THE MORNING  Dispense: 15 mL; Refill: 3  - Basic Metabolic Panel; Future  - atorvastatin (LIPITOR) 20 MG tablet; Take 1 tablet by mouth daily  Dispense: 30 tablet; Refill: 3  - C-Peptide; Future  - Insulin Antibody; Future    2. Essential hypertension  - Will decrease dose to 50 mg daily   - losartan (COZAAR) 50 MG tablet; Take 1 tablet by mouth daily  Dispense: 30 tablet; Refill: 3    3. Gastroesophageal reflux disease without esophagitis  - Refill  - pantoprazole (PROTONIX) 40 MG tablet; take 1 tablet by mouth once daily 30 MINUTES before breakfast  Dispense: 90 tablet;  Refill: 3        Requested Prescriptions     Signed Prescriptions Disp Refills    Continuous Blood Gluc Sensor (FREESTYLE KATIE 14 DAY SENSOR) MISC 1 each 0     Si each by Does not apply route continuous    Continuous Blood Gluc  (FREESTYLE KATIE 2 READER) ZANE 1 each 0     Si each by Does not apply route continuous    insulin glargine (BASAGLAR KWIKPEN) 100 UNIT/ML injection pen 15 mL 3     Sig: TAKE 20 UNITS AT NIGHT AND 5 UNITS IN THE MORNING    atorvastatin (LIPITOR) 20 MG tablet 30 tablet 3     Sig: Take 1 tablet by mouth daily    pantoprazole (PROTONIX) 40 MG tablet 90 tablet 3     Sig: take 1 tablet by mouth once daily 30 MINUTES before breakfast    losartan (COZAAR) 50 MG tablet 30 tablet 3     Sig: Take 1 tablet by mouth daily

## 2023-03-28 DIAGNOSIS — N18.32 STAGE 3B CHRONIC KIDNEY DISEASE (HCC): Primary | ICD-10-CM

## 2023-03-28 NOTE — PROGRESS NOTES
Patient's last BMP showing a creatinine of 2.31 with GFR of 38, significant increase  from last renal function. He has a history of uncontrolled Type 1 Diabetes, likely developing diabetic nephrosclerosis. Did try calling patient to discuss this and inquire about use of any Nephrotoxic medications, he is currently taking Cozaar.  I have placed an order for a Nephrology Referral.

## 2023-05-03 PROBLEM — N17.9 AKI (ACUTE KIDNEY INJURY) (HCC): Status: ACTIVE | Noted: 2022-10-29

## 2023-05-03 PROBLEM — E16.2 HYPOGLYCEMIA: Status: ACTIVE | Noted: 2022-10-29

## 2023-05-03 PROBLEM — N18.32 STAGE 3B CHRONIC KIDNEY DISEASE (HCC): Status: ACTIVE | Noted: 2023-05-03

## 2023-05-03 PROBLEM — Z00.00 HEALTHCARE MAINTENANCE: Status: ACTIVE | Noted: 2023-05-03

## 2023-06-02 PROBLEM — Z00.00 HEALTHCARE MAINTENANCE: Status: RESOLVED | Noted: 2023-05-03 | Resolved: 2023-06-02

## 2023-06-29 ENCOUNTER — TELEPHONE (OUTPATIENT)
Dept: FAMILY MEDICINE CLINIC | Age: 33
End: 2023-06-29

## 2023-06-29 NOTE — TELEPHONE ENCOUNTER
Patient called to confirm which MG of Losartan patient is taking, also to schedule a follow up appointment was supposed to follow up in the office back in April, patient did not answer.

## 2023-08-15 DIAGNOSIS — E10.9 TYPE 1 DIABETES MELLITUS WITHOUT COMPLICATION (HCC): ICD-10-CM

## 2023-08-15 RX ORDER — PEN NEEDLE, DIABETIC 32 GX5/16"
NEEDLE, DISPOSABLE MISCELLANEOUS
Qty: 100 EACH | Refills: 5 | Status: SHIPPED | OUTPATIENT
Start: 2023-08-15

## 2023-08-15 NOTE — TELEPHONE ENCOUNTER
Diabetic ketoacidosis without coma ([address])     Smoking     Hypomagnesemia     HLD (hyperlipidemia)     Ketosis due to diabetes (HCC)     Nausea     McArdle disease (HCC)     Gastroenteritis     Muscle spasm     Essential hypertension     Gastroesophageal reflux disease without esophagitis     Hypoxia     JASIEL (acute kidney injury) ([address])     Hypoglycemia     Stage 3b chronic kidney disease ([address])

## 2023-08-16 DIAGNOSIS — E10.9 TYPE 1 DIABETES MELLITUS WITHOUT COMPLICATION (HCC): ICD-10-CM

## 2023-08-16 NOTE — TELEPHONE ENCOUNTER
Last visit: 03/14/2023  Last Med refill: 03/03/2023  Does patient have enough medication for 72 hours: No:     Next Visit Date:  Future Appointments   Date Time Provider [address]   9/19/2023  8:30 AM Vita Bird  [facility],[place] [address] Maintenance   Topic Date Due    COVID-19 Vaccine (1) Never done    Pneumococcal 0-64 years Vaccine (1 - PCV) Never done    Diabetic retinal exam  Never done    Hepatitis B vaccine (1 of 3 - Risk 3-dose series) Never done    DTaP/Tdap/Td vaccine (6 - Td or Tdap) 01/16/2017    Diabetic foot exam  08/30/2022    Diabetic Alb to Cr ratio (uACR) test  06/02/2023    Lipids  06/02/2023    A1C test (Diabetic or Prediabetic)  06/14/2023    Flu vaccine (1) Never done    Depression Screen  03/14/2024    GFR test (Diabetes, CKD 3-4, OR last GFR 15-59)  03/14/2024    Hib vaccine  Completed    Hepatitis C screen  Completed    HIV screen  Completed    Hepatitis A vaccine  Aged Out    Meningococcal (ACWY) vaccine  Aged Out    Varicella vaccine  Discontinued       Hemoglobin A1C (%)   Date Value   03/14/2023 9.5   06/02/2022 9.7   11/15/2021 11.4             ( goal A1C is < 7)   No components found for: LABMICR  LDL Cholesterol (mg/dL)   Date Value   06/02/2022 129   05/29/2013 109 (H)       (goal LDL is <100)   AST (U/L)   Date Value   05/24/2021 28     ALT (U/L)   Date Value   05/24/2021 33     BUN (mg/dL)   Date Value   03/14/2023 28 (H)     BP Readings from Last 3 Encounters:   03/14/23 (!) 147/95   06/02/22 138/86   11/15/21 (!) 150/83          (goal 120/80)    All Future Testing planned in [facility]  Lab Frequency Next Occurrence   C-Peptide Once 03/14/2023   Insulin Antibody Once 62/55/4122   Basic Metabolic Panel Once 88/21/5676   CBC with Auto Differential Once 05/09/2023   Magnesium Once 05/09/2023   Phosphorus Once 05/09/2023   Urinalysis with Microscopic Once 05/09/2023               Patient Active Problem List:     Type 1 diabetes mellitus without complication

## 2023-08-17 RX ORDER — INSULIN ASPART 100 [IU]/ML
INJECTION, SOLUTION INTRAVENOUS; SUBCUTANEOUS
Qty: 15 ML | Refills: 0 | Status: SHIPPED | OUTPATIENT
Start: 2023-08-17

## 2023-08-17 NOTE — TELEPHONE ENCOUNTER
Spoke with patient, explained to patient he has an appointment scheduled already to disregard the message.

## 2023-12-04 ENCOUNTER — HOSPITAL ENCOUNTER (OUTPATIENT)
Age: 33
Setting detail: SPECIMEN
Discharge: HOME | End: 2023-12-04
Payer: COMMERCIAL

## 2023-12-04 ENCOUNTER — OFFICE VISIT (OUTPATIENT)
Dept: FAMILY MEDICINE CLINIC | Age: 33
End: 2023-12-04
Payer: COMMERCIAL

## 2023-12-04 VITALS
HEIGHT: 66 IN | TEMPERATURE: 98.1 F | WEIGHT: 159 LBS | OXYGEN SATURATION: 100 % | HEART RATE: 88 BPM | DIASTOLIC BLOOD PRESSURE: 101 MMHG | BODY MASS INDEX: 25.55 KG/M2 | SYSTOLIC BLOOD PRESSURE: 173 MMHG

## 2023-12-04 DIAGNOSIS — N18.32 STAGE 3B CHRONIC KIDNEY DISEASE (HCC): ICD-10-CM

## 2023-12-04 DIAGNOSIS — M25.512 ACUTE PAIN OF LEFT SHOULDER: Primary | ICD-10-CM

## 2023-12-04 DIAGNOSIS — I10 ESSENTIAL HYPERTENSION: ICD-10-CM

## 2023-12-04 DIAGNOSIS — E10.9 TYPE 1 DIABETES MELLITUS WITHOUT COMPLICATION (HCC): ICD-10-CM

## 2023-12-04 LAB
ANION GAP SERPL CALCULATED.3IONS-SCNC: 11 MMOL/L (ref 9–17)
BUN SERPL-MCNC: 27 MG/DL (ref 6–20)
CALCIUM SERPL-MCNC: 8.8 MG/DL (ref 8.6–10.4)
CHLORIDE SERPL-SCNC: 104 MMOL/L (ref 98–107)
CO2 SERPL-SCNC: 26 MMOL/L (ref 20–31)
CREAT SERPL-MCNC: 2.7 MG/DL (ref 0.7–1.2)
GFR SERPL CREATININE-BSD FRML MDRD: 31 ML/MIN/1.73M2
GLUCOSE SERPL-MCNC: 132 MG/DL (ref 70–99)
HBA1C MFR BLD: ABNORMAL %
POTASSIUM SERPL-SCNC: 4 MMOL/L (ref 3.7–5.3)
SODIUM SERPL-SCNC: 141 MMOL/L (ref 135–144)

## 2023-12-04 PROCEDURE — 83036 HEMOGLOBIN GLYCOSYLATED A1C: CPT

## 2023-12-04 PROCEDURE — 36415 COLL VENOUS BLD VENIPUNCTURE: CPT

## 2023-12-04 PROCEDURE — 80048 BASIC METABOLIC PNL TOTAL CA: CPT

## 2023-12-04 RX ORDER — LOSARTAN POTASSIUM 50 MG/1
50 TABLET ORAL DAILY
Qty: 30 TABLET | Refills: 3 | Status: SHIPPED | OUTPATIENT
Start: 2023-12-04

## 2023-12-04 RX ORDER — LIDOCAINE 50 MG/G
1 PATCH TOPICAL DAILY
Qty: 10 PATCH | Refills: 3 | Status: SHIPPED | OUTPATIENT
Start: 2023-12-04 | End: 2024-01-13

## 2023-12-04 ASSESSMENT — ENCOUNTER SYMPTOMS: SHORTNESS OF BREATH: 0

## 2023-12-04 NOTE — PROGRESS NOTES
DIABETES and HYPERTENSION visit    BP Readings from Last 3 Encounters:   03/14/23 (!) 147/95   06/02/22 138/86   11/15/21 (!) 150/83        Hemoglobin A1C (%)   Date Value   03/14/2023 9.5   06/02/2022 9.7   11/15/2021 11.4     LDL Cholesterol (mg/dL)   Date Value   06/02/2022 129     HDL (mg/dL)   Date Value   06/02/2022 75     BUN (mg/dL)   Date Value   03/14/2023 28 (H)     Creatinine (mg/dL)   Date Value   03/14/2023 2.31 (H)     Glucose (mg/dL)   Date Value   03/14/2023 129 (H)   10/11/2011 182 (H)            Have you changed or started any medications since your last visit including any over-the-counter medicines, vitamins, or herbal medicines? no   Have you stopped taking any of your medications? Is so, why? -  no  Are you having any side effects from any of your medications? - no    Have you seen any other physician or provider since your last visit?  no   Have you had any other diagnostic tests since your last visit? yes - Labs   Have you been seen in the emergency room and/or had an admission in a hospital since we last saw you?  no   Have you had your routine dental cleaning in the past 6 months?  no     Have you had your annual diabetic retinal (eye) exam? No   (ensure copy of exam is in the chart)    Do you have an active MyChart account? If no, what is the barrier?   Yes    Patient Care Team:  Jerman Mejia MD as PCP - General (Family Medicine)    Medical History Review  Past Medical, Family, and Social History reviewed and does not contribute to the patient presenting condition    Health Maintenance   Topic Date Due    Hepatitis B vaccine (1 of 3 - 3-dose series) Never done    COVID-19 Vaccine (1) Never done    Pneumococcal 0-64 years Vaccine (1 - PCV) Never done    Diabetic retinal exam  Never done    DTaP/Tdap/Td vaccine (6 - Td or Tdap) 01/16/2017    Diabetic foot exam  08/30/2022    Diabetic Alb to Cr ratio (uACR) test  06/02/2023    Lipids  06/02/2023    A1C test (Diabetic or Prediabetic)

## 2023-12-04 NOTE — PROGRESS NOTES
Subjective:    Armida Summers is a 35 y.o. 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 Presented to the office today for:  Chief Complaint   Patient presents with    Shoulder Pain     Patient has had for 3 weeks / in the chest area also      77-year-old male with history of type 1 diabetes, hypertension who presents with left shoulder pain for the past 3 weeks. The pain is sharp, occasionally radiates to the left chest area. Does not recall any trauma to the left shoulder. Has not taken anything for the pain at home. Pain is constant. He works as a . He has been taking his insulin at home as prescribed, 20 units Basaglar at night and 5 units in the morning. Does check his blood sugars at home, 120's in the morning and up to 180s in the afternoon. Denies symptoms of hypoglycemia such as lightheadedness, sweating. His blood pressure is elevated to the 194L systolic today. Has not taken his losartan in 3 days because he ran out. Would like a refill today. Denies headache, blurry vision, numbness, weakness. He does have a history of chronic kidney disease stage IIIb, creatinine 2.31 last March elevated from 1.27 two years prior. Was referred to a nephrologist a few months ago but missed his appointment, would like a new referral today. Review of Systems   Constitutional:  Negative for chills and fever. Eyes:  Negative for visual disturbance. Respiratory:  Negative for shortness of breath. Cardiovascular:  Positive for chest pain. Musculoskeletal:  Positive for arthralgias and myalgias. Neurological:  Negative for dizziness, weakness, light-headedness and numbness.      The patient has a   Family History   Problem Relation Age of Onset    Diabetes Father     Other Father         PAD     Objective:    BP (!) 173/101 (Site: Left Upper Arm, Position: Sitting, Cuff Size: Medium Adult)   Pulse 88

## 2023-12-07 DIAGNOSIS — E10.9 TYPE 1 DIABETES MELLITUS WITHOUT COMPLICATION (HCC): ICD-10-CM

## 2023-12-08 RX ORDER — INSULIN ASPART 100 [IU]/ML
INJECTION, SOLUTION INTRAVENOUS; SUBCUTANEOUS
Qty: 15 ML | Refills: 4 | Status: SHIPPED | OUTPATIENT
Start: 2023-12-08

## 2023-12-08 NOTE — TELEPHONE ENCOUNTER
Last visit: 12/04/2023  Last Med refill: 08/17/2023  Does patient have enough medication for 72 hours: No:     Next Visit Date:  No future appointments.    Health Maintenance   Topic Date Due    Hepatitis B vaccine (1 of 3 - 3-dose series) Never done    COVID-19 Vaccine (1) Never done    Pneumococcal 0-64 years Vaccine (1 - PCV) Never done    Diabetic retinal exam  Never done    DTaP/Tdap/Td vaccine (6 - Td or Tdap) 01/16/2017    Diabetic foot exam  08/30/2022    Diabetic Alb to Cr ratio (uACR) test  06/02/2023    Lipids  06/02/2023    A1C test (Diabetic or Prediabetic)  06/14/2023    Flu vaccine (1) Never done    Depression Screen  03/14/2024    GFR test (Diabetes, CKD 3-4, OR last GFR 15-59)  12/04/2024    Hib vaccine  Completed    Hepatitis C screen  Completed    HIV screen  Completed    Hepatitis A vaccine  Aged Out    HPV vaccine  Aged Out    Meningococcal (ACWY) vaccine  Aged Out    Varicella vaccine  Discontinued       Hemoglobin A1C (%)   Date Value   03/14/2023 9.5   06/02/2022 9.7   11/15/2021 11.4             ( goal A1C is < 7)   No components found for: \"LABMICR\"  LDL Cholesterol (mg/dL)   Date Value   06/02/2022 129   05/29/2013 109 (H)       (goal LDL is <100)   AST (U/L)   Date Value   05/24/2021 28     ALT (U/L)   Date Value   05/24/2021 33     BUN (mg/dL)   Date Value   12/04/2023 27 (H)     BP Readings from Last 3 Encounters:   12/04/23 (!) 173/101   03/14/23 (!) 147/95   06/02/22 138/86          (goal 120/80)    All Future Testing planned in [facility]  Lab Frequency Next Occurrence   C-Peptide Once 03/14/2023   Insulin Antibody Once 03/14/2023   Basic Metabolic Panel Once 05/09/2023   CBC with Auto Differential Once 05/09/2023   Magnesium Once 05/09/2023   Phosphorus Once 05/09/2023   Urinalysis with Microscopic Once 05/09/2023   XR SHOULDER LEFT (MIN 2 VIEWS) Once 12/04/2023               Patient Active Problem List:     Type 1 diabetes mellitus without complication (HCC)     Diabetic ketoacidosis

## 2023-12-29 DIAGNOSIS — E10.9 TYPE 1 DIABETES MELLITUS WITHOUT COMPLICATION (HCC): ICD-10-CM

## 2023-12-29 NOTE — TELEPHONE ENCOUNTER
Last visit: 1204/2023  Last Med refill: 03/14/203  Does patient have enough medication for 72 hours: no    Next Visit Date:  Future Appointments   Date Time Provider Department Center   1/3/2024  2:30 PM [facility]       Health Maintenance   Topic Date Due    Hepatitis B vaccine (1 of 3 - 3-dose series) Never done    COVID-19 Vaccine (1) Never done    Pneumococcal 0-64 years Vaccine (1 - PCV) Never done    Diabetic retinal exam  Never done    DTaP/Tdap/Td vaccine (6 - Td or Tdap) 01/16/2017    Diabetic foot exam  08/30/2022    Diabetic Alb to Cr ratio (uACR) test  06/02/2023    Lipids  06/02/2023    A1C test (Diabetic or Prediabetic)  06/14/2023    Flu vaccine (1) Never done    Depression Screen  03/14/2024    GFR test (Diabetes, CKD 3-4, OR last GFR 15-59)  12/04/2024    Hib vaccine  Completed    Polio vaccine  Completed    Hepatitis C screen  Completed    HIV screen  Completed    Hepatitis A vaccine  Aged Out    HPV vaccine  Aged Out    Meningococcal (ACWY) vaccine  Aged Out    Varicella vaccine  Discontinued       Hemoglobin A1C (%)   Date Value   03/14/2023 9.5   06/02/2022 9.7   11/15/2021 11.4             ( goal A1C is < 7)   No components found for: \"LABMICR\"  LDL Cholesterol (mg/dL)   Date Value   06/02/2022 129   05/29/2013 109 (H)       (goal LDL is <100)   AST (U/L)   Date Value   05/24/2021 28     ALT (U/L)   Date Value   05/24/2021 33     BUN (mg/dL)   Date Value   12/04/2023 27 (H)     BP Readings from Last 3 Encounters:   12/22/23 134/86   12/04/23 (!) 173/101   03/14/23 (!) 147/95          (goal 120/80)    All Future Testing planned in [facility]  Lab Frequency Next Occurrence   C-Peptide Once 03/14/2023   Insulin Antibody Once 03/14/2023   Basic Metabolic Panel Once 05/09/2023   CBC with Auto Differential Once 05/09/2023   Magnesium Once 05/09/2023   Phosphorus Once 05/09/2023   Urinalysis with Microscopic Once 05/09/2023   XR SHOULDER LEFT (MIN 2 VIEWS) Once 12/04/2023

## 2024-01-01 RX ORDER — INSULIN GLARGINE 100 [IU]/ML
INJECTION, SOLUTION SUBCUTANEOUS
Qty: 15 ML | Refills: 4 | Status: SHIPPED | OUTPATIENT
Start: 2024-01-01

## 2024-01-26 ENCOUNTER — TELEPHONE (OUTPATIENT)
Dept: FAMILY MEDICINE CLINIC | Age: 34
End: 2024-01-26

## 2024-01-26 NOTE — TELEPHONE ENCOUNTER
Pharmacy is requesting Zofran 4 mg tablets.  Looking into patient's chart this has not been ordered in a long time

## 2024-02-04 RX ORDER — ONDANSETRON 4 MG/1
4 TABLET, FILM COATED ORAL 3 TIMES DAILY PRN
Qty: 15 TABLET | Refills: 0 | Status: SHIPPED | OUTPATIENT
Start: 2024-02-04 | End: 2024-02-11

## 2024-04-17 DIAGNOSIS — K21.9 GASTROESOPHAGEAL REFLUX DISEASE WITHOUT ESOPHAGITIS: ICD-10-CM

## 2024-04-17 RX ORDER — PANTOPRAZOLE SODIUM 40 MG/1
TABLET, DELAYED RELEASE ORAL
Qty: 90 TABLET | Refills: 3 | Status: SHIPPED | OUTPATIENT
Start: 2024-04-17

## 2024-04-17 NOTE — TELEPHONE ENCOUNTER
Last visit: 12/4/23  Last Med refill:   Does patient have enough medication for 72 hours: No:     Next Visit Date:  Future Appointments   Date Time Provider Department Center   4/24/2024 11:45 AM Leslie Henley MD [facility]       Health Maintenance   Topic Date Due    Hepatitis B vaccine (1 of 3 - 3-dose series) Never done    COVID-19 Vaccine (1) Never done    Pneumococcal 0-64 years Vaccine (1 of 2 - PCV) Never done    Diabetic retinal exam  Never done    DTaP/Tdap/Td vaccine (6 - Td or Tdap) 01/16/2017    Diabetic foot exam  08/30/2022    Diabetic Alb to Cr ratio (uACR) test  06/02/2023    Lipids  06/02/2023    Annual Wellness Visit (Medicare Advantage)  01/01/2024    A1C test (Diabetic or Prediabetic)  03/14/2024    Depression Screen  03/14/2024    Flu vaccine (Season Ended) 08/01/2024    GFR test (Diabetes, CKD 3-4, OR last GFR 15-59)  12/04/2024    Hib vaccine  Completed    Polio vaccine  Completed    Hepatitis C screen  Completed    HIV screen  Completed    Hepatitis A vaccine  Aged Out    HPV vaccine  Aged Out    Meningococcal (ACWY) vaccine  Aged Out    Varicella vaccine  Discontinued       Hemoglobin A1C (%)   Date Value   03/14/2023 9.5   06/02/2022 9.7   11/15/2021 11.4             ( goal A1C is < 7)   No components found for: \"LABMICR\"  LDL Cholesterol (mg/dL)   Date Value   06/02/2022 129   05/29/2013 109 (H)       (goal LDL is <100)   AST (U/L)   Date Value   05/24/2021 28     ALT (U/L)   Date Value   05/24/2021 33     BUN (mg/dL)   Date Value   12/04/2023 27 (H)     BP Readings from Last 3 Encounters:   12/22/23 134/86   12/04/23 (!) 173/101   03/14/23 (!) 147/95          (goal 120/80)    All Future Testing planned in [facility]  Lab Frequency Next Occurrence   Basic Metabolic Panel Once 05/09/2023   CBC with Auto Differential Once 05/09/2023   Magnesium Once 05/09/2023   Phosphorus Once 05/09/2023   Urinalysis with Microscopic Once 05/09/2023   XR SHOULDER LEFT (MIN 2 VIEWS) Once

## 2024-04-23 ENCOUNTER — HOSPITAL ENCOUNTER (OUTPATIENT)
Age: 34
Discharge: HOME | End: 2024-04-23
Payer: MEDICARE

## 2024-04-23 DIAGNOSIS — Z13.21 ENCOUNTER FOR VITAMIN DEFICIENCY SCREENING: ICD-10-CM

## 2024-04-23 DIAGNOSIS — N18.30 BENIGN HYPERTENSION WITH CKD (CHRONIC KIDNEY DISEASE) STAGE III (HCC): ICD-10-CM

## 2024-04-23 DIAGNOSIS — N18.30 SECONDARY DIABETES MELLITUS WITH STAGE 3 CHRONIC KIDNEY DISEASE (HCC): ICD-10-CM

## 2024-04-23 DIAGNOSIS — I12.9 BENIGN HYPERTENSION WITH CKD (CHRONIC KIDNEY DISEASE) STAGE III (HCC): ICD-10-CM

## 2024-04-23 DIAGNOSIS — N18.32 STAGE 3B CHRONIC KIDNEY DISEASE (HCC): ICD-10-CM

## 2024-04-23 DIAGNOSIS — E13.22 SECONDARY DIABETES MELLITUS WITH STAGE 3 CHRONIC KIDNEY DISEASE (HCC): ICD-10-CM

## 2024-04-23 LAB
25(OH)D3 SERPL-MCNC: <6 NG/ML (ref 30–100)
ANION GAP SERPL CALCULATED.3IONS-SCNC: 9 MMOL/L (ref 9–16)
BILIRUB UR QL STRIP: NEGATIVE
BUN SERPL-MCNC: 24 MG/DL (ref 6–20)
C3 SERPL-MCNC: 126 MG/DL (ref 90–180)
C4 SERPL-MCNC: 29 MG/DL (ref 10–40)
CALCIUM SERPL-MCNC: 8 MG/DL (ref 8.6–10.4)
CHLORIDE SERPL-SCNC: 106 MMOL/L (ref 98–107)
CLARITY UR: CLEAR
CO2 SERPL-SCNC: 26 MMOL/L (ref 20–31)
COLOR UR: YELLOW
CREAT SERPL-MCNC: 3 MG/DL (ref 0.7–1.2)
CREAT UR-MCNC: 313 MG/DL (ref 39–259)
EPI CELLS #/AREA URNS HPF: ABNORMAL /HPF (ref 0–5)
FREE KAPPA/LAMBDA RATIO: 1.97 (ref 0.22–1.74)
GFR SERPL CREATININE-BSD FRML MDRD: 27 ML/MIN/1.73M2
GLUCOSE UR STRIP-MCNC: NEGATIVE MG/DL
HCT VFR BLD AUTO: 35.2 % (ref 40.7–50.3)
HGB BLD-MCNC: 11.4 G/DL (ref 13–17)
HGB UR QL STRIP.AUTO: ABNORMAL
KAPPA LC FREE SER-MCNC: 69 MG/L
KETONES UR STRIP-MCNC: ABNORMAL MG/DL
LAMBDA LC FREE SERPL-MCNC: 35.1 MG/L (ref 4.2–27.7)
LEUKOCYTE ESTERASE UR QL STRIP: NEGATIVE
MAGNESIUM SERPL-MCNC: 1.9 MG/DL (ref 1.6–2.6)
MICROALBUMIN UR-MCNC: 5480 MG/L
MICROALBUMIN/CREAT UR-RTO: 1751 MCG/MG CREAT
NITRITE UR QL STRIP: NEGATIVE
PH UR STRIP: 5.5 [PH] (ref 5–8)
PHOSPHATE SERPL-MCNC: 3.7 MG/DL (ref 2.5–4.5)
POTASSIUM SERPL-SCNC: 3.8 MMOL/L (ref 3.7–5.3)
PROT UR STRIP-MCNC: ABNORMAL MG/DL
RBC #/AREA URNS HPF: ABNORMAL /HPF (ref 0–2)
SODIUM SERPL-SCNC: 141 MMOL/L (ref 136–145)
SP GR UR STRIP: 1.02 (ref 1–1.03)
UROBILINOGEN UR STRIP-ACNC: NORMAL EU/DL (ref 0–1)
WBC #/AREA URNS HPF: ABNORMAL /HPF (ref 0–5)

## 2024-04-23 PROCEDURE — 82043 UR ALBUMIN QUANTITATIVE: CPT

## 2024-04-23 PROCEDURE — 82565 ASSAY OF CREATININE: CPT

## 2024-04-23 PROCEDURE — 86162 COMPLEMENT TOTAL (CH50): CPT

## 2024-04-23 PROCEDURE — 82306 VITAMIN D 25 HYDROXY: CPT

## 2024-04-23 PROCEDURE — 82310 ASSAY OF CALCIUM: CPT

## 2024-04-23 PROCEDURE — 83735 ASSAY OF MAGNESIUM: CPT

## 2024-04-23 PROCEDURE — 85014 HEMATOCRIT: CPT

## 2024-04-23 PROCEDURE — 81001 URINALYSIS AUTO W/SCOPE: CPT

## 2024-04-23 PROCEDURE — 36415 COLL VENOUS BLD VENIPUNCTURE: CPT

## 2024-04-23 PROCEDURE — 86038 ANTINUCLEAR ANTIBODIES: CPT

## 2024-04-23 PROCEDURE — 86160 COMPLEMENT ANTIGEN: CPT

## 2024-04-23 PROCEDURE — 83516 IMMUNOASSAY NONANTIBODY: CPT

## 2024-04-23 PROCEDURE — 83521 IG LIGHT CHAINS FREE EACH: CPT

## 2024-04-23 PROCEDURE — 82570 ASSAY OF URINE CREATININE: CPT

## 2024-04-23 PROCEDURE — 85018 HEMOGLOBIN: CPT

## 2024-04-23 PROCEDURE — 84520 ASSAY OF UREA NITROGEN: CPT

## 2024-04-23 PROCEDURE — 84100 ASSAY OF PHOSPHORUS: CPT

## 2024-04-23 PROCEDURE — 86225 DNA ANTIBODY NATIVE: CPT

## 2024-04-23 PROCEDURE — 80051 ELECTROLYTE PANEL: CPT

## 2024-04-25 LAB — COMPLEMENT TOTAL (CH50): 63.3 U/ML (ref 38.7–89.9)

## 2024-04-26 LAB
ANA SER QL IA: NEGATIVE
ANCA MYELOPEROXIDASE: <0.3 AU/ML (ref 0–3.5)
ANCA PROTEINASE 3: <0.7 AU/ML (ref 0–2)
DSDNA IGG SER QL IA: 1 IU/ML
NUCLEAR IGG SER IA-RTO: 0.1 U/ML

## 2024-05-02 ENCOUNTER — HOSPITAL ENCOUNTER (OUTPATIENT)
Dept: ULTRASOUND IMAGING | Age: 34
Discharge: HOME | End: 2024-05-04
Attending: INTERNAL MEDICINE
Payer: MEDICARE

## 2024-05-02 DIAGNOSIS — Z13.21 ENCOUNTER FOR VITAMIN DEFICIENCY SCREENING: ICD-10-CM

## 2024-05-02 DIAGNOSIS — I12.9 BENIGN HYPERTENSION WITH CKD (CHRONIC KIDNEY DISEASE) STAGE III (HCC): ICD-10-CM

## 2024-05-02 DIAGNOSIS — N18.30 SECONDARY DIABETES MELLITUS WITH STAGE 3 CHRONIC KIDNEY DISEASE (HCC): ICD-10-CM

## 2024-05-02 DIAGNOSIS — N18.30 BENIGN HYPERTENSION WITH CKD (CHRONIC KIDNEY DISEASE) STAGE III (HCC): ICD-10-CM

## 2024-05-02 DIAGNOSIS — N18.32 STAGE 3B CHRONIC KIDNEY DISEASE (HCC): ICD-10-CM

## 2024-05-02 DIAGNOSIS — E13.22 SECONDARY DIABETES MELLITUS WITH STAGE 3 CHRONIC KIDNEY DISEASE (HCC): ICD-10-CM

## 2024-05-02 PROCEDURE — 76770 US EXAM ABDO BACK WALL COMP: CPT

## 2024-07-05 ENCOUNTER — HOSPITAL ENCOUNTER (EMERGENCY)
Age: 34
Discharge: HOME | End: 2024-07-05
Attending: EMERGENCY MEDICINE
Payer: MEDICARE

## 2024-07-05 ENCOUNTER — APPOINTMENT (OUTPATIENT)
Dept: GENERAL RADIOLOGY | Age: 34
End: 2024-07-05
Payer: MEDICARE

## 2024-07-05 VITALS
HEIGHT: 66 IN | DIASTOLIC BLOOD PRESSURE: 106 MMHG | SYSTOLIC BLOOD PRESSURE: 154 MMHG | WEIGHT: 156 LBS | TEMPERATURE: 98.8 F | HEART RATE: 88 BPM | BODY MASS INDEX: 25.07 KG/M2 | OXYGEN SATURATION: 95 % | RESPIRATION RATE: 18 BRPM

## 2024-07-05 DIAGNOSIS — S62.336B: Primary | ICD-10-CM

## 2024-07-05 PROCEDURE — 73130 X-RAY EXAM OF HAND: CPT

## 2024-07-05 PROCEDURE — 73110 X-RAY EXAM OF WRIST: CPT

## 2024-07-05 PROCEDURE — 6370000000 HC RX 637 (ALT 250 FOR IP)

## 2024-07-05 PROCEDURE — 99283 EMERGENCY DEPT VISIT LOW MDM: CPT

## 2024-07-05 RX ORDER — IBUPROFEN 400 MG/1
400 TABLET ORAL ONCE
Status: COMPLETED | OUTPATIENT
Start: 2024-07-05 | End: 2024-07-05

## 2024-07-05 RX ORDER — ACETAMINOPHEN 500 MG
1000 TABLET ORAL ONCE
Status: COMPLETED | OUTPATIENT
Start: 2024-07-05 | End: 2024-07-05

## 2024-07-05 RX ADMIN — IBUPROFEN 400 MG: 400 TABLET, FILM COATED ORAL at 14:49

## 2024-07-05 RX ADMIN — ACETAMINOPHEN 1000 MG: 500 TABLET ORAL at 14:49

## 2024-07-05 ASSESSMENT — PAIN - FUNCTIONAL ASSESSMENT: PAIN_FUNCTIONAL_ASSESSMENT: 0-10

## 2024-07-05 ASSESSMENT — PAIN SCALES - GENERAL: PAINLEVEL_OUTOF10: 8

## 2024-07-05 NOTE — DISCHARGE INSTRUCTIONS
-You were seen and evaluated by emergency medicine physicians at [facility].    -Please follow-up with your primary care physician and/or with the referrals to specialist.    -You were diagnosed with: Fracture of the fifth metacarpal bone on your right hand    -Imaging showed fracture.  You were splinted in the ED.  Please follow-up with the plastic/hand surgeon next week.  A referral has been included in your discharge paperwork.  Please call the number listed.    -Please return to the Emergency Department if you are experiencing the following symptoms acutely: Worsening swelling, numbness or tingling of your hand, Headache, fever, chills, nausea, vomiting, chest pain, shortness of breath, abdominal pain, change with urination, change with bowel movements, change in your skin/hair/nail, weakness, fatigue, altered mental status and/or any change from baseline health.    -Thank you for coming to [facility].

## 2024-07-05 NOTE — ED PROVIDER NOTES
[facility] ED     Emergency Department     Faculty Attestation    I performed a history and physical examination of the patient and discussed management with the resident. I reviewed the resident’s note and agree with the documented findings and plan of care. Any areas of disagreement are noted on the chart. I was personally present for the key portions of any procedures. I have documented in the chart those procedures where I was not present during the key portions. I have reviewed the emergency nurses triage note. I agree with the chief complaint, past medical history, past surgical history, allergies, medications, social and family history as documented unless otherwise noted below. For Physician Assistant/ Nurse Practitioner cases/documentation I have personally evaluated this patient and have completed at least one if not all key elements of the E/M (history, physical exam, and MDM). Additional findings are as noted.    2:50 PM EDT    Patient presents with right hand pain that he has had for the past 2 or 3 days.  He says he woke up with the pain.  He denies any specific injury.  He has no other complaints.  On exam, patient is sitting up in a chair and appears well.  There is pain over the right fifth metacarpal.  There is some moderate edema to the hand but no overlying erythema or warmth.  Capillary refill and distal sensation is intact.  There is no wrist tenderness.  Will get x-ray of the hand and treat patient's pain.      Abimbola Robertson MD  Attending Emergency  Physician

## 2024-07-05 NOTE — ED PROVIDER NOTES
Faculty Sign-Out Attestation  Handoff taken on the following patient from prior Attending Physician: Marcelo    Note Started: 3:36 PM EDT    I was available and discussed any additional care issues that arose and coordinated the management plans with the resident(s) caring for the patient during my duty period. Any areas of disagreement with resident’s documentation of care or procedures are noted on the chart. I was personally present for the key portions of any/all procedures during my duty period. I have documented in the chart those procedures where I was not present during the key portions.    XR WRIST RIGHT (MIN 3 VIEWS)   Final Result   Fracture 5th and possibly 4th metacarpals.  Recommend dedicated hand films.         XR HAND RIGHT (MIN 3 VIEWS)   Final Result   Acute, nondisplaced, 50 degrees volar ulnar apex angulated transverse   fracture distal diaphysis right 5th metacarpal bone.      Soft tissue edema, presumably reactive edema, contusion and/or sprain.               Shola Hobbs MD  Attending Physician        Shola Hobbs MD  07/05/24 [id number]

## 2024-07-05 NOTE — ED PROVIDER NOTES
[facility] ED  Emergency Department Encounter  Emergency Medicine Resident     Pt Name:Vaibhav Mixon  MRN: [id number]  Birthdate 1990  Date of evaluation: 7/5/24  PCP:  Nathan Nassar MD  Note Started: 2:21 PM EDT      CHIEF COMPLAINT       Chief Complaint   Patient presents with    Hand Pain     Right and swelling denies any known injury       HISTORY OF PRESENT ILLNESS  (Location/Symptom, Timing/Onset, Context/Setting, Quality, Duration, Modifying Factors, Severity.)      Vaibhav Mixon is a 34-year-old male presenting with a right hand injury. The patient reports that the hand has been swollen and he has been unable to move it or  anything for the past three days. He has taken Motrin and attempted to soak the hand, but these measures have not provided relief. The patient denies any trauma such as slamming the hand or punching anything. He recalls balling up his fist but does not associate it with any specific injury. The patient reports significant pain when attempting to squeeze his hand and tenderness over the third, fourth, and fifth metacarpal bones. There is no tenderness over the fingers. The patient denies any prior surgeries on the hand but has an old scar on the ulnar side of the right wrist. He has a good radial pulse and the hand is warm to touch. The patient denies heavy alcohol use but admits to smoking cigarettes and using recreational drugs including marijuana and cocaine. He woke up three days ago with the swelling and has not experienced any improvement since then.      PAST MEDICAL / SURGICAL / SOCIAL / FAMILY HISTORY      has a past medical history of Diabetes mellitus (HCC), HLD (hyperlipidemia), McArdle disease (HCC), and Smoking.       has no past surgical history on file.      Social History     Socioeconomic History    Marital status:      Spouse name: Not on file    Number of children: Not on file    Years of education: Not on file    Highest

## 2024-07-07 DIAGNOSIS — E10.9 TYPE 1 DIABETES MELLITUS WITHOUT COMPLICATION (HCC): ICD-10-CM

## 2024-07-08 RX ORDER — INSULIN ASPART 100 [IU]/ML
INJECTION, SOLUTION INTRAVENOUS; SUBCUTANEOUS
Qty: 15 ML | Refills: 4 | Status: CANCELLED | OUTPATIENT
Start: 2024-07-08

## 2024-07-08 NOTE — TELEPHONE ENCOUNTER
Last visit: 12/04/2023  Last Med refill: 08/15/2023-01/01/ Does patient have enough medication for 72 hours: No:     Next Visit Date:  Future Appointments   Date Time Provider Department Center   7/15/2024  9:40 AM [facility] Nassar MD [facility]       Health Maintenance   Topic Date Due    Hepatitis B vaccine (1 of 3 - 3-dose series) Never done    COVID-19 Vaccine (1) Never done    Pneumococcal 0-64 years Vaccine (1 of 2 - PCV) Never done    Diabetic retinal exam  Never done    DTaP/Tdap/Td vaccine (6 - Td or Tdap) 01/16/2017    Diabetic foot exam  08/30/2022    Lipids  06/02/2023    A1C test (Diabetic or Prediabetic)  03/14/2024    Depression Screen  03/14/2024    Annual Wellness Visit (Medicare)  04/21/2024    Flu vaccine (1) 08/01/2024    Diabetic Alb to Cr ratio (uACR) test  04/23/2025    GFR test (Diabetes, CKD 3-4, OR last GFR 15-59)  04/23/2025    Hib vaccine  Completed    Polio vaccine  Completed    Hepatitis C screen  Completed    HIV screen  Completed    Hepatitis A vaccine  Aged Out    HPV vaccine  Aged Out    Meningococcal (ACWY) vaccine  Aged Out    Varicella vaccine  Discontinued       Hemoglobin A1C (%)   Date Value   03/14/2023 9.5   06/02/2022 9.7   11/15/2021 11.4             ( goal A1C is < 7)   No components found for: \"LABMICR\"  No components found for: \"LDLCHOLESTEROL\", \"LDLCALC\"    (goal LDL is <100)   AST (U/L)   Date Value   05/24/2021 28     ALT (U/L)   Date Value   05/24/2021 33     BUN (mg/dL)   Date Value   04/23/2024 24 (H)     BP Readings from Last 3 Encounters:   07/05/24 (!) 154/106   04/24/24 (!) 142/84   12/22/23 134/86          (goal 120/80)    All Future Testing planned in [facility]  Lab Frequency Next Occurrence   XR SHOULDER LEFT (MIN 2 VIEWS) Once 12/04/2023   Creatinine Clearance, Urine, 24 HR Once 12/22/2023   Protein, 24 Hr Urine Once 12/22/2023   Sodium, urine, 24 hour Once 12/22/2023   Hemoglobin and Hematocrit Once 04/24/2024   Calcium Once 04/24/2024   BUN &

## 2024-07-09 NOTE — TELEPHONE ENCOUNTER
Patient called in this afternoon stating his insurance is will not pay for the Novolog and wants to know if you can send something else in. Please Advise

## 2024-07-10 RX ORDER — PEN NEEDLE, DIABETIC 32 GX5/16"
NEEDLE, DISPOSABLE MISCELLANEOUS
Qty: 100 EACH | Refills: 5 | Status: SHIPPED | OUTPATIENT
Start: 2024-07-10

## 2024-07-10 RX ORDER — INSULIN GLARGINE 100 [IU]/ML
INJECTION, SOLUTION SUBCUTANEOUS
Qty: 15 ML | Refills: 4 | Status: SHIPPED | OUTPATIENT
Start: 2024-07-10

## 2024-07-15 ENCOUNTER — OFFICE VISIT (OUTPATIENT)
Dept: FAMILY MEDICINE CLINIC | Age: 34
End: 2024-07-15
Payer: MEDICARE

## 2024-07-15 VITALS
DIASTOLIC BLOOD PRESSURE: 94 MMHG | WEIGHT: 154.2 LBS | SYSTOLIC BLOOD PRESSURE: 159 MMHG | BODY MASS INDEX: 24.78 KG/M2 | HEIGHT: 66 IN | HEART RATE: 85 BPM

## 2024-07-15 DIAGNOSIS — N18.32 TYPE 1 DIABETES MELLITUS WITH STAGE 3B CHRONIC KIDNEY DISEASE (HCC): ICD-10-CM

## 2024-07-15 DIAGNOSIS — E74.04 MCARDLE DISEASE (HCC): ICD-10-CM

## 2024-07-15 DIAGNOSIS — E10.9 TYPE 1 DIABETES MELLITUS WITHOUT COMPLICATION (HCC): ICD-10-CM

## 2024-07-15 DIAGNOSIS — E10.65 TYPE 1 DIABETES MELLITUS WITH HYPERGLYCEMIA (HCC): ICD-10-CM

## 2024-07-15 DIAGNOSIS — E10.22 TYPE 1 DIABETES MELLITUS WITH STAGE 3B CHRONIC KIDNEY DISEASE (HCC): ICD-10-CM

## 2024-07-15 DIAGNOSIS — Z00.00 HEALTHCARE MAINTENANCE: ICD-10-CM

## 2024-07-15 DIAGNOSIS — K21.9 GASTROESOPHAGEAL REFLUX DISEASE WITHOUT ESOPHAGITIS: ICD-10-CM

## 2024-07-15 DIAGNOSIS — S62.306D CLOSED NONDISPLACED FRACTURE OF FIFTH METACARPAL BONE OF RIGHT HAND WITH ROUTINE HEALING, UNSPECIFIED PORTION OF METACARPAL, SUBSEQUENT ENCOUNTER: Primary | ICD-10-CM

## 2024-07-15 LAB — HBA1C MFR BLD: 8 %

## 2024-07-15 PROCEDURE — G8427 DOCREV CUR MEDS BY ELIG CLIN: HCPCS

## 2024-07-15 PROCEDURE — 99213 OFFICE O/P EST LOW 20 MIN: CPT

## 2024-07-15 PROCEDURE — 83036 HEMOGLOBIN GLYCOSYLATED A1C: CPT

## 2024-07-15 PROCEDURE — 3080F DIAST BP >= 90 MM HG: CPT

## 2024-07-15 PROCEDURE — G8420 CALC BMI NORM PARAMETERS: HCPCS

## 2024-07-15 PROCEDURE — 4004F PT TOBACCO SCREEN RCVD TLK: CPT

## 2024-07-15 PROCEDURE — 2022F DILAT RTA XM EVC RTNOPTHY: CPT

## 2024-07-15 PROCEDURE — 3052F HG A1C>EQUAL 8.0%<EQUAL 9.0%: CPT

## 2024-07-15 PROCEDURE — 3077F SYST BP >= 140 MM HG: CPT

## 2024-07-15 RX ORDER — ACETAMINOPHEN 500 MG
500 TABLET ORAL EVERY 6 HOURS PRN
Qty: 120 TABLET | Refills: 3 | Status: SHIPPED | OUTPATIENT
Start: 2024-07-15

## 2024-07-15 RX ORDER — FAMOTIDINE 40 MG/1
40 TABLET, FILM COATED ORAL EVERY EVENING
Qty: 30 TABLET | Refills: 3 | Status: SHIPPED | OUTPATIENT
Start: 2024-07-15

## 2024-07-15 RX ORDER — INSULIN ASPART 100 [IU]/ML
5 INJECTION, SOLUTION INTRAVENOUS; SUBCUTANEOUS
Qty: 10 ML | Refills: 3 | Status: SHIPPED | OUTPATIENT
Start: 2024-07-15

## 2024-07-15 SDOH — ECONOMIC STABILITY: FOOD INSECURITY: WITHIN THE PAST 12 MONTHS, THE FOOD YOU BOUGHT JUST DIDN'T LAST AND YOU DIDN'T HAVE MONEY TO GET MORE.: NEVER TRUE

## 2024-07-15 SDOH — ECONOMIC STABILITY: FOOD INSECURITY: WITHIN THE PAST 12 MONTHS, YOU WORRIED THAT YOUR FOOD WOULD RUN OUT BEFORE YOU GOT MONEY TO BUY MORE.: NEVER TRUE

## 2024-07-15 SDOH — ECONOMIC STABILITY: INCOME INSECURITY: HOW HARD IS IT FOR YOU TO PAY FOR THE VERY BASICS LIKE FOOD, HOUSING, MEDICAL CARE, AND HEATING?: NOT HARD AT ALL

## 2024-07-15 ASSESSMENT — PATIENT HEALTH QUESTIONNAIRE - PHQ9
SUM OF ALL RESPONSES TO PHQ QUESTIONS 1-9: 0
2. FEELING DOWN, DEPRESSED OR HOPELESS: NOT AT ALL
SUM OF ALL RESPONSES TO PHQ9 QUESTIONS 1 & 2: 0
1. LITTLE INTEREST OR PLEASURE IN DOING THINGS: NOT AT ALL
SUM OF ALL RESPONSES TO PHQ QUESTIONS 1-9: 0

## 2024-07-15 NOTE — PROGRESS NOTES
Attending Physician Statement  I  have discussed the care of Vaibhav Mixon including pertinent history and exam findings with the resident. I agree with the assessment, plan and orders as documented by the resident.      BP (!) 159/94 (Site: Left Upper Arm)   Pulse 85   Ht 1.676 m (5' 5.98\")   Wt 69.9 kg (154 lb 3.2 oz)   BMI 24.90 kg/m²    BP Readings from Last 3 Encounters:   07/15/24 (!) 159/94   07/05/24 (!) 154/106   04/24/24 (!) 142/84     Wt Readings from Last 3 Encounters:   07/15/24 69.9 kg (154 lb 3.2 oz)   07/05/24 70.8 kg (156 lb)   04/24/24 75.9 kg (167 lb 6.4 oz)          Diagnosis Orders   1. Closed nondisplaced fracture of fifth metacarpal bone of right hand with routine healing, unspecified portion of metacarpal, subsequent encounter  acetaminophen (APAP EXTRA STRENGTH) 500 MG tablet    Lamonte Mulligan DO, Orthopaedic Surgery, [facility]      2. Type 1 diabetes mellitus without complication (HCC)  POCT glycosylated hemoglobin (Hb A1C)    Basic Metabolic Panel      3. McArdle disease (HCC)        4. Type 1 diabetes mellitus with hyperglycemia (HCC)  insulin aspart (NOVOLOG) 100 UNIT/ML injection vial      5. Type 1 diabetes mellitus with stage 3b chronic kidney disease (HCC)        6. Gastroesophageal reflux disease without esophagitis  famotidine (PEPCID) 40 MG tablet      7. Healthcare maintenance  Lipid Panel              Jae Mckoy MD 7/15/2024 2:03 PM      
Visit Information    Have you changed or started any medications since your last visit including any over-the-counter medicines, vitamins, or herbal medicines? no   Are you having any side effects from any of your medications? -  no  Have you stopped taking any of your medications? Is so, why? -  no    Have you seen any other physician or provider since your last visit? No  Have you had any other diagnostic tests since your last visit? Yes - Records Obtained  Have you been seen in the emergency room and/or had an admission to a hospital since we last saw you? Yes - Records Obtained  Have you had your routine dental cleaning in the past 6 months? no    Have you activated your [employer] account? If not, what are your barriers? Yes     Patient Care Team:  Nathan Nassar MD as PCP - General (Family Medicine)    Medical History Review  Past Medical, Family, and Social History reviewed and does not contribute to the patient presenting condition    Health Maintenance   Topic Date Due    Hepatitis B vaccine (1 of 3 - 3-dose series) Never done    COVID-19 Vaccine (1) Never done    Pneumococcal 0-64 years Vaccine (1 of 2 - PCV) Never done    Diabetic retinal exam  Never done    DTaP/Tdap/Td vaccine (6 - Td or Tdap) 01/16/2017    Diabetic foot exam  08/30/2022    Lipids  06/02/2023    A1C test (Diabetic or Prediabetic)  03/14/2024    Depression Screen  03/14/2024    Annual Wellness Visit (Medicare)  04/21/2024    Flu vaccine (1) 08/01/2024    Diabetic Alb to Cr ratio (uACR) test  04/23/2025    GFR test (Diabetes, CKD 3-4, OR last GFR 15-59)  04/23/2025    Hib vaccine  Completed    Polio vaccine  Completed    Hepatitis C screen  Completed    HIV screen  Completed    Hepatitis A vaccine  Aged Out    HPV vaccine  Aged Out    Meningococcal (ACWY) vaccine  Aged Out    Varicella vaccine  Discontinued       
(PEPCID) 40 MG tablet 30 tablet 3     Sig: Take 1 tablet by mouth every evening       There are no discontinued medications.    Vaibhav received counseling on the following healthy behaviors: nutrition, exercise and medication adherence    Discussed use,benefit, and side effects of prescribed medications.  Barriers to medication compliance addressed.      All patient questions answered.  Pt voiced understanding.     Return in about 4 weeks (around 8/12/2024) for htn, metacarpal fracture f/u.        Disclaimer: Some orall of this note was transcribed using voice-recognition software.This may cause typographical errors occasionally. Although all effort is made to fix these errors, please do not hesitate to contact our office if there isany concern with the understanding of this note.

## 2024-07-24 ENCOUNTER — TELEPHONE (OUTPATIENT)
Dept: FAMILY MEDICINE CLINIC | Age: 34
End: 2024-07-24

## 2024-07-24 DIAGNOSIS — E10.65 TYPE 1 DIABETES MELLITUS WITH HYPERGLYCEMIA (HCC): Primary | ICD-10-CM

## 2024-07-24 RX ORDER — INSULIN ASPART INJECTION 100 [IU]/ML
5 INJECTION, SOLUTION SUBCUTANEOUS
Qty: 3 ML | Refills: 3 | Status: SHIPPED | OUTPATIENT
Start: 2024-07-24

## 2024-07-24 NOTE — TELEPHONE ENCOUNTER
Fax received from [employer] stating that pt's Novolog has been denied coverage and an alternative will need to be sent over following the end of their temporary supply. They do recommend either: Fiasp, Fiasp Flextouch, or Fiasp Penfill per their formulary. Medication Pended, Pharmacy confirmed. Please review and advise.

## 2024-10-16 DIAGNOSIS — E10.65 TYPE 1 DIABETES MELLITUS WITH HYPERGLYCEMIA (HCC): ICD-10-CM

## 2024-10-16 RX ORDER — INSULIN ASPART 100 [IU]/ML
5 INJECTION, SOLUTION INTRAVENOUS; SUBCUTANEOUS
Qty: 10 ML | Refills: 4 | Status: SHIPPED | OUTPATIENT
Start: 2024-10-16

## 2024-10-16 NOTE — TELEPHONE ENCOUNTER
Last visit: 07/15/2024  Last Med refill: 07/15/2024  Does patient have enough medication for 72 hours: No:     Next Visit Date:  No future appointments.    Health Maintenance   Topic Date Due    Pneumococcal 0-64 years Vaccine (1 of 2 - PCV) Never done    Diabetic retinal exam  Never done    Hepatitis B vaccine (1 of 3 - 19+ 3-dose series) Never done    DTaP/Tdap/Td vaccine (6 - Td or Tdap) 01/16/2017    Diabetic foot exam  08/30/2022    Lipids  06/02/2023    Annual Wellness Visit (Medicare)  04/21/2024    Flu vaccine (1) Never done    COVID-19 Vaccine (1 - 2023-24 season) Never done    Diabetic Alb to Cr ratio (uACR) test  04/23/2025    GFR test (Diabetes, CKD 3-4, OR last GFR 15-59)  04/23/2025    A1C test (Diabetic or Prediabetic)  07/15/2025    Depression Screen  07/15/2025    Hib vaccine  Completed    Polio vaccine  Completed    Hepatitis C screen  Completed    HIV screen  Completed    Hepatitis A vaccine  Aged Out    HPV vaccine  Aged Out    Meningococcal (ACWY) vaccine  Aged Out    Varicella vaccine  Discontinued       Hemoglobin A1C (%)   Date Value   07/15/2024 8.0   03/14/2023 9.5   06/02/2022 9.7             ( goal A1C is < 7)   No components found for: \"LABMICR\"  No components found for: \"LDLCHOLESTEROL\", \"LDLCALC\"    (goal LDL is <100)   AST (U/L)   Date Value   05/24/2021 28     ALT (U/L)   Date Value   05/24/2021 33     BUN (mg/dL)   Date Value   04/23/2024 24 (H)     BP Readings from Last 3 Encounters:   07/15/24 (!) 159/94   07/05/24 (!) 154/106   04/24/24 (!) 142/84          (goal 120/80)    All Future Testing planned in [facility]  Lab Frequency Next Occurrence   XR SHOULDER LEFT (MIN 2 VIEWS) Once 12/04/2023   Creatinine Clearance, Urine, 24 HR Once 12/22/2023   Protein, 24 Hr Urine Once 12/22/2023   Sodium, urine, 24 hour Once 12/22/2023   Hemoglobin and Hematocrit Once 04/24/2024   Calcium Once 04/24/2024   BUN & Creatinine Once 04/24/2024   Electrolyte Panel Once 04/24/2024   Magnesium Once

## 2024-10-22 DIAGNOSIS — I10 ESSENTIAL HYPERTENSION: ICD-10-CM

## 2024-10-22 RX ORDER — LOSARTAN POTASSIUM 50 MG/1
50 TABLET ORAL DAILY
Qty: 30 TABLET | Refills: 4 | Status: SHIPPED | OUTPATIENT
Start: 2024-10-22

## 2024-10-22 NOTE — TELEPHONE ENCOUNTER
Hematocrit Once 04/24/2024   Calcium Once 04/24/2024   BUN & Creatinine Once 04/24/2024   Electrolyte Panel Once 04/24/2024   Magnesium Once 04/24/2024   Phosphorus Once 04/24/2024   Vitamin D 25 Hydroxy Once 04/24/2024   Urinalysis with Microscopic Once 04/24/2024   Microalbumin / Creatinine Urine Ratio Once 04/24/2024   Immunofixation Serum Profile Once 04/24/2024   Basic Metabolic Panel Once 07/15/2024   Lipid Panel Once 07/15/2024               Patient Active Problem List:     Type 1 diabetes mellitus without complication (HCC)     Diabetic ketoacidosis without coma (HCC)     Smoking     Hypomagnesemia     HLD (hyperlipidemia)     Ketosis due to diabetes (HCC)     Nausea     McArdle disease (HCC)     Gastroenteritis     Muscle spasm     Essential hypertension     Gastroesophageal reflux disease without esophagitis     Hypoxia     JASIEL (acute kidney injury) (HCC)     Hypoglycemia     Stage 3b chronic kidney disease (HCC)     Type 1 diabetes mellitus with hyperglycemia (HCC)     Type 1 diabetes mellitus with diabetic chronic kidney disease

## 2024-12-06 DIAGNOSIS — E10.9 TYPE 1 DIABETES MELLITUS WITHOUT COMPLICATION (HCC): ICD-10-CM

## 2024-12-06 RX ORDER — INSULIN GLARGINE 100 [IU]/ML
INJECTION, SOLUTION SUBCUTANEOUS
Qty: 15 ML | Refills: 3 | Status: SHIPPED | OUTPATIENT
Start: 2024-12-06

## 2024-12-06 NOTE — TELEPHONE ENCOUNTER
E-scribe request for med refill. Please review and e-scribe if applicable.     Last Visit Date:  07/15/2024  Next Visit Date:  1/6/2025    Hemoglobin A1C (%)   Date Value   07/15/2024 8.0   03/14/2023 9.5   06/02/2022 9.7             ( goal A1C is < 7)   No components found for: \"LABMICR\"  No components found for: \"LDLCHOLESTEROL\", \"LDLCALC\"    (goal LDL is <100)   AST (U/L)   Date Value   05/24/2021 28     ALT (U/L)   Date Value   05/24/2021 33     BUN (mg/dL)   Date Value   04/23/2024 24 (H)     BP Readings from Last 3 Encounters:   07/15/24 (!) 159/94   07/05/24 (!) 154/106   04/24/24 (!) 142/84          (goal 120/80)        Patient Active Problem List:     Type 1 diabetes mellitus without complication (HCC)     Diabetic ketoacidosis without coma (HCC)     Smoking     Hypomagnesemia     HLD (hyperlipidemia)     Ketosis due to diabetes (HCC)     Nausea     McArdle disease (HCC)     Gastroenteritis     Muscle spasm     Essential hypertension     Gastroesophageal reflux disease without esophagitis     Hypoxia     JASIEL (acute kidney injury) (HCC)     Hypoglycemia     Stage 3b chronic kidney disease (HCC)     Type 1 diabetes mellitus with hyperglycemia (HCC)     Type 1 diabetes mellitus with diabetic chronic kidney disease      ----KAROLYN

## 2025-04-30 ENCOUNTER — OFFICE VISIT (OUTPATIENT)
Age: 35
End: 2025-04-30
Payer: MEDICAID

## 2025-04-30 VITALS
SYSTOLIC BLOOD PRESSURE: 172 MMHG | HEIGHT: 66 IN | DIASTOLIC BLOOD PRESSURE: 115 MMHG | HEART RATE: 81 BPM | WEIGHT: 152.8 LBS | BODY MASS INDEX: 24.56 KG/M2

## 2025-04-30 DIAGNOSIS — R14.0 ABDOMINAL BLOATING: ICD-10-CM

## 2025-04-30 DIAGNOSIS — N18.32 TYPE 1 DIABETES MELLITUS WITH STAGE 3B CHRONIC KIDNEY DISEASE (HCC): ICD-10-CM

## 2025-04-30 DIAGNOSIS — R10.13 EPIGASTRIC PAIN: ICD-10-CM

## 2025-04-30 DIAGNOSIS — E10.22 TYPE 1 DIABETES MELLITUS WITH STAGE 3B CHRONIC KIDNEY DISEASE (HCC): ICD-10-CM

## 2025-04-30 DIAGNOSIS — Z09 HOSPITAL DISCHARGE FOLLOW-UP: ICD-10-CM

## 2025-04-30 DIAGNOSIS — E78.5 DYSLIPIDEMIA: ICD-10-CM

## 2025-04-30 DIAGNOSIS — K21.9 GASTROESOPHAGEAL REFLUX DISEASE WITHOUT ESOPHAGITIS: ICD-10-CM

## 2025-04-30 DIAGNOSIS — E10.9 TYPE 1 DIABETES MELLITUS WITHOUT COMPLICATION (HCC): Primary | ICD-10-CM

## 2025-04-30 DIAGNOSIS — K59.09 OTHER CONSTIPATION: ICD-10-CM

## 2025-04-30 PROCEDURE — 99496 TRANSJ CARE MGMT HIGH F2F 7D: CPT

## 2025-04-30 RX ORDER — DOCUSATE SODIUM 100 MG/1
100 CAPSULE, LIQUID FILLED ORAL 2 TIMES DAILY
Qty: 60 CAPSULE | Refills: 0 | Status: SHIPPED | OUTPATIENT
Start: 2025-04-30 | End: 2025-05-30

## 2025-04-30 RX ORDER — SIMETHICONE 125 MG
125 TABLET,CHEWABLE ORAL EVERY 6 HOURS PRN
Qty: 60 TABLET | Refills: 3 | Status: SHIPPED | OUTPATIENT
Start: 2025-04-30

## 2025-04-30 SDOH — ECONOMIC STABILITY: TRANSPORTATION INSECURITY
IN THE PAST 12 MONTHS, HAS LACK OF TRANSPORTATION KEPT YOU FROM MEETINGS, WORK, OR FROM GETTING THINGS NEEDED FOR DAILY LIVING?: NO

## 2025-04-30 SDOH — ECONOMIC STABILITY: FOOD INSECURITY: WITHIN THE PAST 12 MONTHS, YOU WORRIED THAT YOUR FOOD WOULD RUN OUT BEFORE YOU GOT MONEY TO BUY MORE.: PATIENT DECLINED

## 2025-04-30 SDOH — ECONOMIC STABILITY: TRANSPORTATION INSECURITY
IN THE PAST 12 MONTHS, HAS THE LACK OF TRANSPORTATION KEPT YOU FROM MEDICAL APPOINTMENTS OR FROM GETTING MEDICATIONS?: NO

## 2025-04-30 SDOH — ECONOMIC STABILITY: INCOME INSECURITY: IN THE LAST 12 MONTHS, WAS THERE A TIME WHEN YOU WERE NOT ABLE TO PAY THE MORTGAGE OR RENT ON TIME?: NO

## 2025-04-30 SDOH — ECONOMIC STABILITY: FOOD INSECURITY: WITHIN THE PAST 12 MONTHS, THE FOOD YOU BOUGHT JUST DIDN'T LAST AND YOU DIDN'T HAVE MONEY TO GET MORE.: PATIENT DECLINED

## 2025-04-30 ASSESSMENT — PATIENT HEALTH QUESTIONNAIRE - PHQ9
SUM OF ALL RESPONSES TO PHQ QUESTIONS 1-9: 0
2. FEELING DOWN, DEPRESSED OR HOPELESS: NOT AT ALL
1. LITTLE INTEREST OR PLEASURE IN DOING THINGS: NOT AT ALL

## 2025-04-30 NOTE — PROGRESS NOTES
Diabetic visit information    BP Readings from Last 3 Encounters:   07/15/24 (!) 159/94   07/05/24 (!) 154/106   04/24/24 (!) 142/84       Hemoglobin A1C (%)   Date Value   07/15/2024 8.0   03/14/2023 9.5   06/02/2022 9.7               Have you changed or started any medications since your last visit including any over-the-counter medicines, vitamins, or herbal medicines? no   Have you stopped taking any of your medications? Is so, why? -  no  Are you having any side effects from any of your medications? - no    Have you seen any other physician or provider since your last visit?  yes   Have you had any other diagnostic tests since your last visit?  yes    Have you been seen in the emergency room and/or had an admission in a hospital since we last saw you?  yes     Have you had your annual diabetic retinal (eye) exam? No   (ensure copy of exam is in the chart)    Have you had your routine dental cleaning in the past 6 months? no    Do you have an active [employer] account?  If not, what are your barriers?  Yes    Patient Care Team:  Nathan Nassar MD as PCP - General (Family Medicine)    Medical history Review  Past Medical, Family, and Social History reviewed and does not contribute to the patient presenting condition.    Health Maintenance   Topic Date Due    Diabetic retinal exam  Never done    Hepatitis B vaccine (1 of 3 - 19+ 3-dose series) Never done    DTaP/Tdap/Td vaccine (1 - Tdap) Never done    Pneumococcal 0-49 years Vaccine (1 of 2 - PCV) Never done    Diabetic foot exam  08/30/2022    Lipids  06/02/2023    COVID-19 Vaccine (1 - 2024-25 season) Never done    Diabetic Alb to Cr ratio (uACR) test  04/23/2025    A1C test (Diabetic or Prediabetic)  07/15/2025    Depression Screen  07/15/2025    Flu vaccine (Season Ended) 08/01/2025    GFR test (Diabetes, CKD 3-4, OR last GFR 15-59)  04/25/2026    Hepatitis C screen  Completed    HIV screen  Completed    Hepatitis A vaccine  Aged Out    Hib vaccine  Aged

## 2025-04-30 NOTE — PROGRESS NOTES
Post-Discharge Transitional Care  Follow Up      Vaibhav Mixon   YOB: 1990    Date of Office Visit:  4/30/2025  Date of Hospital Admission: 4/25/2025  Date of Hospital Discharge: 4/27/2025  Risk of hospital readmission (high >=14%. Medium >=10%) :No data recorded    Care management risk score Rising risk (score 2-5) and Complex Care (Scores >=6): No Risk Score On File     Non face to face  following discharge, date last encounter closed (first attempt may have been earlier): *No documented post hospital discharge outreach found in the last 14 days    Call initiated 2 business days of discharge: *No response recorded in the last 14 days    ASSESSMENT/PLAN:   Type 1 diabetes mellitus (HCC)  -     Albumin/Creatinine Ratio, Urine; Future  -     [facility] - Evangelist Salazar MD, Ophthalmology, [place]  -     Most recent A1c from last week was 11.5.  Discussed with him that he should have referral to endocrinology or clinical pharmacist, both which the patient declined at this time.  -     His kidney function has deteriorated and he is currently stage IV chronic kidney disease.  He has been more than a year since his last appointment with nephrology.  Will call the patient to should call the nephrology office and schedule a follow-up appoint with them.  Dyslipidemia  Other constipation  -     docusate sodium (COLACE) 100 MG capsule; Take 1 capsule by mouth 2 times daily, Disp-60 capsule, R-0Normal  Abdominal bloating  -     simethicone (MYLICON) 125 MG chewable tablet; Take 1 tablet by mouth every 6 hours as needed for Flatulence, Disp-60 tablet, R-3Normal  Epigastric pain        -     Likely secondary to constipation given that has been 7 days since his last bowel movement.  Will order Colace to help him stimulate his bowels.  Audible bowel sounds on auscultation and he has been passing gas therefore less concern for bowel obstructions    -     Lipase; Future  -     CBC with Auto Differential; Future  -

## 2025-04-30 NOTE — PATIENT INSTRUCTIONS
Thank you for letting us take care of you today. We hope all your questions were addressed. If a question was overlooked or something else comes to mind after you return home, please contact a member of your Care Team listed below.      Your Care Team at [facility] is Team #1  Opal Hunt M.D. (Faculty)  Oz Lubin M.D. (Resident)  Rosangela Davis D.O. (Resident)  Nathan Nassar M.D. (Resident)  Richard Núñez M.D. (Resident)  Lupe Barth, [facility]  Jayne Copeland, [facility]  Chinyere Dubon, [facility]  Kaylee Carson, [facility]  Tiffanie Pierre, [facility]  Edda Boone, [facility]  Kamron Peguero (LJ) David,   Dayanara Ramsey [facility] (Clinical Pharmacist)     Office phone number: [phone]    If you need to get in right away due to illness, please be advised we have \"Same Day\" appointments available Monday-Friday. Please call us at [phone] option #3 to schedule your \"Same Day\" appointment.

## 2025-05-01 RX ORDER — PANTOPRAZOLE SODIUM 40 MG/1
TABLET, DELAYED RELEASE ORAL
Qty: 90 TABLET | Refills: 1 | OUTPATIENT
Start: 2025-05-01

## 2025-06-11 DIAGNOSIS — K21.9 GASTROESOPHAGEAL REFLUX DISEASE WITHOUT ESOPHAGITIS: ICD-10-CM

## 2025-06-11 RX ORDER — PANTOPRAZOLE SODIUM 40 MG/1
TABLET, DELAYED RELEASE ORAL
Qty: 90 TABLET | Refills: 5 | Status: SHIPPED | OUTPATIENT
Start: 2025-06-11

## 2025-06-11 NOTE — TELEPHONE ENCOUNTER
Please address the medication refill and close the encounter.  If I can be of assistance, please route to the applicable pool.      Thank you.    Last visit: 4-  Last Med refill: 4-  Does patient have enough medication for 72 hours: No:     Next Visit Date:  No future appointments.    Health Maintenance   Topic Date Due    Diabetic retinal exam  Never done    Hepatitis B vaccine (1 of 3 - 19+ 3-dose series) Never done    DTaP/Tdap/Td vaccine (1 - Tdap) Never done    Pneumococcal 0-49 years Vaccine (1 of 2 - PCV) Never done    Lipids  06/02/2023    COVID-19 Vaccine (1 - 2024-25 season) Never done    Diabetic Alb to Cr ratio (uACR) test  04/23/2025    Diabetic foot exam  05/01/2026 (Originally 4/30/2026)    A1C test (Diabetic or Prediabetic)  07/15/2025    Flu vaccine (Season Ended) 08/01/2025    GFR test (Diabetes, CKD 3-4, OR last GFR 15-59)  04/25/2026    Depression Screen  04/30/2026    Hepatitis C screen  Completed    HIV screen  Completed    Hepatitis A vaccine  Aged Out    Hib vaccine  Aged Out    HPV vaccine  Aged Out    Polio vaccine  Aged Out    Meningococcal (ACWY) vaccine  Aged Out    Meningococcal B vaccine  Aged Out    Varicella vaccine  Discontinued       Hemoglobin A1C (%)   Date Value   07/15/2024 8.0   03/14/2023 9.5   06/02/2022 9.7             ( goal A1C is < 7)   No components found for: \"LABMICR\"  No components found for: \"LDLCHOLESTEROL\", \"LDLCALC\"    (goal LDL is <100)   AST (U/L)   Date Value   04/25/2025 25     ALT (U/L)   Date Value   04/25/2025 30     BUN (mg/dL)   Date Value   04/23/2024 24 (H)     BP Readings from Last 3 Encounters:   04/30/25 (!) 172/115   07/15/24 (!) 159/94   07/05/24 (!) 154/106          (goal 120/80)    All Future Testing planned in [facility]  Lab Frequency Next Occurrence   Basic Metabolic Panel Once 07/15/2024   Lipid Panel Once 07/15/2024   Lipase Once 04/30/2025   CBC with Auto Differential Once 04/30/2025   Comprehensive Metabolic Panel Once

## 2025-08-14 ENCOUNTER — APPOINTMENT (OUTPATIENT)
Dept: ULTRASOUND IMAGING | Age: 35
DRG: 683 | End: 2025-08-14
Payer: MEDICARE

## 2025-08-14 ENCOUNTER — HOSPITAL ENCOUNTER (INPATIENT)
Age: 35
LOS: 2 days | Discharge: HOME | DRG: 683 | End: 2025-08-16
Attending: EMERGENCY MEDICINE | Admitting: STUDENT IN AN ORGANIZED HEALTH CARE EDUCATION/TRAINING PROGRAM
Payer: MEDICARE

## 2025-08-14 ENCOUNTER — APPOINTMENT (OUTPATIENT)
Dept: GENERAL RADIOLOGY | Age: 35
DRG: 683 | End: 2025-08-14
Payer: MEDICARE

## 2025-08-14 DIAGNOSIS — N18.5 ACUTE RENAL FAILURE SUPERIMPOSED ON STAGE 5 CHRONIC KIDNEY DISEASE, NOT ON CHRONIC DIALYSIS, UNSPECIFIED ACUTE RENAL FAILURE TYPE (HCC): Primary | ICD-10-CM

## 2025-08-14 DIAGNOSIS — N17.9 ACUTE RENAL FAILURE SUPERIMPOSED ON STAGE 5 CHRONIC KIDNEY DISEASE, NOT ON CHRONIC DIALYSIS, UNSPECIFIED ACUTE RENAL FAILURE TYPE (HCC): Primary | ICD-10-CM

## 2025-08-14 PROBLEM — N18.9 ACUTE KIDNEY INJURY SUPERIMPOSED ON CKD: Status: ACTIVE | Noted: 2025-08-14

## 2025-08-14 LAB
ALBUMIN SERPL-MCNC: 4.1 G/DL (ref 3.5–5.2)
ALBUMIN/GLOB SERPL: 1.2 {RATIO} (ref 1–2.5)
ALP SERPL-CCNC: 127 U/L (ref 40–129)
ALT SERPL-CCNC: 25 U/L (ref 10–50)
ANION GAP SERPL CALCULATED.3IONS-SCNC: 13 MMOL/L (ref 9–16)
ANION GAP SERPL CALCULATED.3IONS-SCNC: 15 MMOL/L (ref 9–16)
AST SERPL-CCNC: 27 U/L (ref 10–50)
B-OH-BUTYR SERPL-MCNC: 0.15 MMOL/L (ref 0.02–0.27)
BACTERIA URNS QL MICRO: NORMAL
BASOPHILS # BLD: 0.05 K/UL (ref 0–0.2)
BASOPHILS NFR BLD: 1 % (ref 0–2)
BILIRUB SERPL-MCNC: 0.3 MG/DL (ref 0–1.2)
BILIRUB UR QL STRIP: NEGATIVE
BODY TEMPERATURE: 37
BUN SERPL-MCNC: 44 MG/DL (ref 6–20)
BUN SERPL-MCNC: 47 MG/DL (ref 6–20)
CALCIUM SERPL-MCNC: 7.5 MG/DL (ref 8.6–10.4)
CALCIUM SERPL-MCNC: 8.6 MG/DL (ref 8.6–10.4)
CASTS #/AREA URNS LPF: NORMAL /LPF (ref 0–8)
CHLORIDE SERPL-SCNC: 101 MMOL/L (ref 98–107)
CHLORIDE SERPL-SCNC: 95 MMOL/L (ref 98–107)
CK SERPL-CCNC: 1138 U/L (ref 39–308)
CLARITY UR: CLEAR
CO2 SERPL-SCNC: 20 MMOL/L (ref 20–31)
CO2 SERPL-SCNC: 24 MMOL/L (ref 20–31)
COHGB MFR BLD: 3.7 % (ref 0–5)
COLOR UR: YELLOW
CREAT SERPL-MCNC: 6.3 MG/DL (ref 0.7–1.2)
CREAT SERPL-MCNC: 7.2 MG/DL (ref 0.7–1.2)
CREAT UR-MCNC: 100 MG/DL (ref 39–259)
EOSINOPHIL # BLD: 0.14 K/UL (ref 0–0.44)
EOSINOPHILS RELATIVE PERCENT: 2 % (ref 1–4)
EPI CELLS #/AREA URNS HPF: NORMAL /HPF (ref 0–5)
ERYTHROCYTE [DISTWIDTH] IN BLOOD BY AUTOMATED COUNT: 12.9 % (ref 11.8–14.4)
FIO2 ON VENT: ABNORMAL %
GFR, ESTIMATED: 11 ML/MIN/1.73M2
GFR, ESTIMATED: 9 ML/MIN/1.73M2
GLUCOSE BLD-MCNC: 275 MG/DL (ref 75–110)
GLUCOSE BLD-MCNC: 337 MG/DL (ref 75–110)
GLUCOSE BLD-MCNC: 434 MG/DL (ref 75–110)
GLUCOSE SERPL-MCNC: 364 MG/DL (ref 74–99)
GLUCOSE SERPL-MCNC: 435 MG/DL (ref 74–99)
GLUCOSE UR STRIP-MCNC: ABNORMAL MG/DL
HCO3 VENOUS: 26.4 MMOL/L (ref 24–30)
HCT VFR BLD AUTO: 32.9 % (ref 40.7–50.3)
HGB BLD-MCNC: 11.1 G/DL (ref 13–17)
HGB UR QL STRIP.AUTO: ABNORMAL
IMM GRANULOCYTES # BLD AUTO: <0.03 K/UL (ref 0–0.3)
IMM GRANULOCYTES NFR BLD: 0 %
KETONES UR STRIP-MCNC: NEGATIVE MG/DL
LEUKOCYTE ESTERASE UR QL STRIP: NEGATIVE
LIPASE SERPL-CCNC: 21 U/L (ref 13–60)
LYMPHOCYTES NFR BLD: 1.18 K/UL (ref 1.1–3.7)
LYMPHOCYTES RELATIVE PERCENT: 20 % (ref 24–43)
MAGNESIUM SERPL-MCNC: 2.2 MG/DL (ref 1.6–2.6)
MCH RBC QN AUTO: 31 PG (ref 25.2–33.5)
MCHC RBC AUTO-ENTMCNC: 33.7 G/DL (ref 28.4–34.8)
MCV RBC AUTO: 91.9 FL (ref 82.6–102.9)
MONOCYTES NFR BLD: 0.39 K/UL (ref 0.1–1.2)
MONOCYTES NFR BLD: 7 % (ref 3–12)
NEGATIVE BASE EXCESS, VEN: 0.2 MMOL/L (ref 0–2)
NEUTROPHILS NFR BLD: 70 % (ref 36–65)
NEUTS SEG NFR BLD: 4.04 K/UL (ref 1.5–8.1)
NITRITE UR QL STRIP: NEGATIVE
NRBC BLD-RTO: 0 PER 100 WBC
O2 SAT, VEN: 32.6 % (ref 60–85)
OSMOLALITY SERPL: 318 MOSM/KG (ref 275–295)
OSMOLALITY UR: 433 MOSM/KG (ref 80–1300)
PCO2 VENOUS: 51.7 MM HG (ref 39–55)
PH UR STRIP: 6.5 [PH] (ref 5–8)
PH VENOUS: 7.33 (ref 7.32–7.42)
PHOSPHATE SERPL-MCNC: 3.6 MG/DL (ref 2.5–4.5)
PLATELET # BLD AUTO: 267 K/UL (ref 138–453)
PMV BLD AUTO: 8.9 FL (ref 8.1–13.5)
PO2 VENOUS: 21.8 MM HG (ref 30–50)
POTASSIUM SERPL-SCNC: 4 MMOL/L (ref 3.7–5.3)
POTASSIUM SERPL-SCNC: 4.3 MMOL/L (ref 3.7–5.3)
PROT SERPL-MCNC: 7.4 G/DL (ref 6.6–8.7)
PROT UR STRIP-MCNC: ABNORMAL MG/DL
RBC # BLD AUTO: 3.58 M/UL (ref 4.21–5.77)
RBC #/AREA URNS HPF: NORMAL /HPF (ref 0–4)
SODIUM SERPL-SCNC: 132 MMOL/L (ref 136–145)
SODIUM SERPL-SCNC: 136 MMOL/L (ref 136–145)
SODIUM UR-SCNC: 60 MMOL/L
SP GR UR STRIP: 1.02 (ref 1–1.03)
TROPONIN I SERPL HS-MCNC: 55 NG/L (ref 0–22)
TROPONIN I SERPL HS-MCNC: 59 NG/L (ref 0–22)
UROBILINOGEN UR STRIP-ACNC: NORMAL EU/DL (ref 0–1)
UUN UR-MCNC: 383 MG/DL
WBC #/AREA URNS HPF: NORMAL /HPF (ref 0–5)
WBC OTHER # BLD: 5.8 K/UL (ref 3.5–11.3)

## 2025-08-14 PROCEDURE — 82550 ASSAY OF CK (CPK): CPT

## 2025-08-14 PROCEDURE — 84484 ASSAY OF TROPONIN QUANT: CPT

## 2025-08-14 PROCEDURE — 83930 ASSAY OF BLOOD OSMOLALITY: CPT

## 2025-08-14 PROCEDURE — 83690 ASSAY OF LIPASE: CPT

## 2025-08-14 PROCEDURE — 82947 ASSAY GLUCOSE BLOOD QUANT: CPT

## 2025-08-14 PROCEDURE — 2580000003 HC RX 258

## 2025-08-14 PROCEDURE — 36415 COLL VENOUS BLD VENIPUNCTURE: CPT

## 2025-08-14 PROCEDURE — 96375 TX/PRO/DX INJ NEW DRUG ADDON: CPT

## 2025-08-14 PROCEDURE — 2060000000 HC ICU INTERMEDIATE R&B

## 2025-08-14 PROCEDURE — 99285 EMERGENCY DEPT VISIT HI MDM: CPT

## 2025-08-14 PROCEDURE — 84100 ASSAY OF PHOSPHORUS: CPT

## 2025-08-14 PROCEDURE — 81001 URINALYSIS AUTO W/SCOPE: CPT

## 2025-08-14 PROCEDURE — 84540 ASSAY OF URINE/UREA-N: CPT

## 2025-08-14 PROCEDURE — 83935 ASSAY OF URINE OSMOLALITY: CPT

## 2025-08-14 PROCEDURE — 82570 ASSAY OF URINE CREATININE: CPT

## 2025-08-14 PROCEDURE — 80053 COMPREHEN METABOLIC PANEL: CPT

## 2025-08-14 PROCEDURE — 83735 ASSAY OF MAGNESIUM: CPT

## 2025-08-14 PROCEDURE — 6360000002 HC RX W HCPCS

## 2025-08-14 PROCEDURE — 71045 X-RAY EXAM CHEST 1 VIEW: CPT

## 2025-08-14 PROCEDURE — 2500000003 HC RX 250 WO HCPCS

## 2025-08-14 PROCEDURE — 96374 THER/PROPH/DIAG INJ IV PUSH: CPT

## 2025-08-14 PROCEDURE — 82010 KETONE BODYS QUAN: CPT

## 2025-08-14 PROCEDURE — 84156 ASSAY OF PROTEIN URINE: CPT

## 2025-08-14 PROCEDURE — 84300 ASSAY OF URINE SODIUM: CPT

## 2025-08-14 PROCEDURE — 93005 ELECTROCARDIOGRAM TRACING: CPT | Performed by: STUDENT IN AN ORGANIZED HEALTH CARE EDUCATION/TRAINING PROGRAM

## 2025-08-14 PROCEDURE — 80048 BASIC METABOLIC PNL TOTAL CA: CPT

## 2025-08-14 PROCEDURE — 82805 BLOOD GASES W/O2 SATURATION: CPT

## 2025-08-14 PROCEDURE — 76775 US EXAM ABDO BACK WALL LIM: CPT

## 2025-08-14 PROCEDURE — 85025 COMPLETE CBC W/AUTO DIFF WBC: CPT

## 2025-08-14 PROCEDURE — 51798 US URINE CAPACITY MEASURE: CPT

## 2025-08-14 RX ORDER — SODIUM CHLORIDE 0.9 % (FLUSH) 0.9 %
5-40 SYRINGE (ML) INJECTION PRN
Status: DISCONTINUED | OUTPATIENT
Start: 2025-08-14 | End: 2025-08-16 | Stop reason: HOSPADM

## 2025-08-14 RX ORDER — FAMOTIDINE 20 MG/1
40 TABLET, FILM COATED ORAL EVERY EVENING
Status: DISCONTINUED | OUTPATIENT
Start: 2025-08-14 | End: 2025-08-16 | Stop reason: HOSPADM

## 2025-08-14 RX ORDER — HYDRALAZINE HYDROCHLORIDE 20 MG/ML
5 INJECTION INTRAMUSCULAR; INTRAVENOUS EVERY 6 HOURS PRN
Status: DISCONTINUED | OUTPATIENT
Start: 2025-08-14 | End: 2025-08-15

## 2025-08-14 RX ORDER — SODIUM CHLORIDE 0.9 % (FLUSH) 0.9 %
5-40 SYRINGE (ML) INJECTION EVERY 12 HOURS SCHEDULED
Status: DISCONTINUED | OUTPATIENT
Start: 2025-08-14 | End: 2025-08-16 | Stop reason: HOSPADM

## 2025-08-14 RX ORDER — 0.9 % SODIUM CHLORIDE 0.9 %
1000 INTRAVENOUS SOLUTION INTRAVENOUS ONCE
Status: COMPLETED | OUTPATIENT
Start: 2025-08-14 | End: 2025-08-14

## 2025-08-14 RX ORDER — LOSARTAN POTASSIUM 50 MG/1
50 TABLET ORAL DAILY
Status: DISCONTINUED | OUTPATIENT
Start: 2025-08-15 | End: 2025-08-16 | Stop reason: HOSPADM

## 2025-08-14 RX ORDER — ATORVASTATIN CALCIUM 20 MG/1
20 TABLET, FILM COATED ORAL DAILY
Status: DISCONTINUED | OUTPATIENT
Start: 2025-08-15 | End: 2025-08-16 | Stop reason: HOSPADM

## 2025-08-14 RX ORDER — ACETAMINOPHEN 325 MG/1
650 TABLET ORAL EVERY 6 HOURS PRN
Status: DISCONTINUED | OUTPATIENT
Start: 2025-08-14 | End: 2025-08-16 | Stop reason: HOSPADM

## 2025-08-14 RX ORDER — DEXTROSE MONOHYDRATE 100 MG/ML
INJECTION, SOLUTION INTRAVENOUS CONTINUOUS PRN
Status: DISCONTINUED | OUTPATIENT
Start: 2025-08-14 | End: 2025-08-16 | Stop reason: HOSPADM

## 2025-08-14 RX ORDER — GLUCAGON 1 MG/ML
1 KIT INJECTION PRN
Status: DISCONTINUED | OUTPATIENT
Start: 2025-08-14 | End: 2025-08-16 | Stop reason: HOSPADM

## 2025-08-14 RX ORDER — INSULIN GLARGINE 100 [IU]/ML
20 INJECTION, SOLUTION SUBCUTANEOUS NIGHTLY
Status: DISCONTINUED | OUTPATIENT
Start: 2025-08-14 | End: 2025-08-15

## 2025-08-14 RX ORDER — ERGOCALCIFEROL 1.25 MG/1
50000 CAPSULE, LIQUID FILLED ORAL WEEKLY
Status: DISCONTINUED | OUTPATIENT
Start: 2025-08-17 | End: 2025-08-16 | Stop reason: HOSPADM

## 2025-08-14 RX ORDER — ACETAMINOPHEN 650 MG/1
650 SUPPOSITORY RECTAL EVERY 6 HOURS PRN
Status: DISCONTINUED | OUTPATIENT
Start: 2025-08-14 | End: 2025-08-16 | Stop reason: HOSPADM

## 2025-08-14 RX ORDER — SODIUM CHLORIDE 9 MG/ML
INJECTION, SOLUTION INTRAVENOUS CONTINUOUS
Status: DISCONTINUED | OUTPATIENT
Start: 2025-08-14 | End: 2025-08-15

## 2025-08-14 RX ORDER — ONDANSETRON 2 MG/ML
4 INJECTION INTRAMUSCULAR; INTRAVENOUS ONCE
Status: COMPLETED | OUTPATIENT
Start: 2025-08-14 | End: 2025-08-14

## 2025-08-14 RX ORDER — PANTOPRAZOLE SODIUM 40 MG/1
40 TABLET, DELAYED RELEASE ORAL
Status: DISCONTINUED | OUTPATIENT
Start: 2025-08-15 | End: 2025-08-16 | Stop reason: HOSPADM

## 2025-08-14 RX ORDER — SODIUM CHLORIDE 9 MG/ML
INJECTION, SOLUTION INTRAVENOUS PRN
Status: DISCONTINUED | OUTPATIENT
Start: 2025-08-14 | End: 2025-08-16 | Stop reason: HOSPADM

## 2025-08-14 RX ORDER — ONDANSETRON 4 MG/1
4 TABLET, ORALLY DISINTEGRATING ORAL EVERY 8 HOURS PRN
Status: DISCONTINUED | OUTPATIENT
Start: 2025-08-14 | End: 2025-08-16 | Stop reason: HOSPADM

## 2025-08-14 RX ORDER — INSULIN GLARGINE 100 [IU]/ML
5 INJECTION, SOLUTION SUBCUTANEOUS EVERY MORNING
Status: DISCONTINUED | OUTPATIENT
Start: 2025-08-15 | End: 2025-08-16 | Stop reason: HOSPADM

## 2025-08-14 RX ORDER — HEPARIN SODIUM 5000 [USP'U]/ML
5000 INJECTION, SOLUTION INTRAVENOUS; SUBCUTANEOUS EVERY 8 HOURS SCHEDULED
Status: DISCONTINUED | OUTPATIENT
Start: 2025-08-14 | End: 2025-08-16 | Stop reason: HOSPADM

## 2025-08-14 RX ORDER — ONDANSETRON 2 MG/ML
4 INJECTION INTRAMUSCULAR; INTRAVENOUS EVERY 6 HOURS PRN
Status: DISCONTINUED | OUTPATIENT
Start: 2025-08-14 | End: 2025-08-16 | Stop reason: HOSPADM

## 2025-08-14 RX ADMIN — FAMOTIDINE 20 MG: 10 INJECTION, SOLUTION INTRAVENOUS at 20:16

## 2025-08-14 RX ADMIN — SODIUM CHLORIDE: 9 INJECTION, SOLUTION INTRAVENOUS at 22:53

## 2025-08-14 RX ADMIN — SODIUM CHLORIDE 1000 ML: 0.9 INJECTION, SOLUTION INTRAVENOUS at 20:14

## 2025-08-14 RX ADMIN — ONDANSETRON 4 MG: 2 INJECTION, SOLUTION INTRAMUSCULAR; INTRAVENOUS at 20:16

## 2025-08-14 ASSESSMENT — LIFESTYLE VARIABLES: HOW OFTEN DO YOU HAVE A DRINK CONTAINING ALCOHOL: NEVER

## 2025-08-15 PROBLEM — N18.5 ACUTE RENAL FAILURE SUPERIMPOSED ON STAGE 5 CHRONIC KIDNEY DISEASE, NOT ON CHRONIC DIALYSIS (HCC): Status: ACTIVE | Noted: 2025-08-14

## 2025-08-15 LAB
ALBUMIN SERPL-MCNC: 3.8 G/DL (ref 3.5–5.2)
ALBUMIN/GLOB SERPL: 1.4 {RATIO} (ref 1–2.5)
ALP SERPL-CCNC: 111 U/L (ref 40–129)
ALT SERPL-CCNC: 22 U/L (ref 10–50)
ANION GAP SERPL CALCULATED.3IONS-SCNC: 12 MMOL/L (ref 9–16)
AST SERPL-CCNC: 24 U/L (ref 10–50)
BASOPHILS # BLD: 0.06 K/UL (ref 0–0.2)
BASOPHILS NFR BLD: 1 % (ref 0–2)
BILIRUB SERPL-MCNC: 0.3 MG/DL (ref 0–1.2)
BUN SERPL-MCNC: 42 MG/DL (ref 6–20)
CALCIUM SERPL-MCNC: 8 MG/DL (ref 8.6–10.4)
CHLORIDE SERPL-SCNC: 106 MMOL/L (ref 98–107)
CO2 SERPL-SCNC: 23 MMOL/L (ref 20–31)
CREAT SERPL-MCNC: 6.2 MG/DL (ref 0.7–1.2)
CREAT UR-MCNC: 109 MG/DL (ref 39–259)
EOSINOPHIL # BLD: 0.24 K/UL (ref 0–0.44)
EOSINOPHILS RELATIVE PERCENT: 4 % (ref 1–4)
ERYTHROCYTE [DISTWIDTH] IN BLOOD BY AUTOMATED COUNT: 12.9 % (ref 11.8–14.4)
EST. AVERAGE GLUCOSE BLD GHB EST-MCNC: 263 MG/DL
GFR, ESTIMATED: 11 ML/MIN/1.73M2
GLUCOSE BLD-MCNC: 103 MG/DL (ref 75–110)
GLUCOSE BLD-MCNC: 107 MG/DL (ref 75–110)
GLUCOSE BLD-MCNC: 141 MG/DL (ref 75–110)
GLUCOSE BLD-MCNC: 151 MG/DL (ref 75–110)
GLUCOSE BLD-MCNC: 201 MG/DL (ref 75–110)
GLUCOSE BLD-MCNC: 207 MG/DL (ref 75–110)
GLUCOSE BLD-MCNC: 35 MG/DL (ref 75–110)
GLUCOSE BLD-MCNC: 37 MG/DL (ref 75–110)
GLUCOSE BLD-MCNC: 53 MG/DL (ref 75–110)
GLUCOSE BLD-MCNC: 97 MG/DL (ref 75–110)
GLUCOSE SERPL-MCNC: 41 MG/DL (ref 74–99)
HBA1C MFR BLD: 10.8 % (ref 4–6)
HCT VFR BLD AUTO: 29.7 % (ref 40.7–50.3)
HGB BLD-MCNC: 9.8 G/DL (ref 13–17)
IMM GRANULOCYTES # BLD AUTO: <0.03 K/UL (ref 0–0.3)
IMM GRANULOCYTES NFR BLD: 0 %
INR PPP: 1
LYMPHOCYTES NFR BLD: 1.83 K/UL (ref 1.1–3.7)
LYMPHOCYTES RELATIVE PERCENT: 34 % (ref 24–43)
MAGNESIUM SERPL-MCNC: 2.3 MG/DL (ref 1.6–2.6)
MCH RBC QN AUTO: 30.5 PG (ref 25.2–33.5)
MCHC RBC AUTO-ENTMCNC: 33 G/DL (ref 28.4–34.8)
MCV RBC AUTO: 92.5 FL (ref 82.6–102.9)
MONOCYTES NFR BLD: 0.46 K/UL (ref 0.1–1.2)
MONOCYTES NFR BLD: 8 % (ref 3–12)
NEUTROPHILS NFR BLD: 53 % (ref 36–65)
NEUTS SEG NFR BLD: 2.86 K/UL (ref 1.5–8.1)
NRBC BLD-RTO: 0 PER 100 WBC
PLATELET # BLD AUTO: 246 K/UL (ref 138–453)
PMV BLD AUTO: 8.7 FL (ref 8.1–13.5)
POTASSIUM SERPL-SCNC: 3.9 MMOL/L (ref 3.7–5.3)
PROT SERPL-MCNC: 6.6 G/DL (ref 6.6–8.7)
PROTHROMBIN TIME: 13.3 SEC (ref 11.7–14.9)
RBC # BLD AUTO: 3.21 M/UL (ref 4.21–5.77)
SODIUM SERPL-SCNC: 141 MMOL/L (ref 136–145)
TOTAL PROTEIN, URINE: 163 MG/DL
TOTAL PROTEIN, URINE: 191 MG/DL
URINE TOTAL PROTEIN CREATININE RATIO: 1.5 (ref 0–0.2)
WBC OTHER # BLD: 5.5 K/UL (ref 3.5–11.3)

## 2025-08-15 PROCEDURE — 6370000000 HC RX 637 (ALT 250 FOR IP): Performed by: NURSE PRACTITIONER

## 2025-08-15 PROCEDURE — 83036 HEMOGLOBIN GLYCOSYLATED A1C: CPT

## 2025-08-15 PROCEDURE — 6360000002 HC RX W HCPCS: Performed by: NURSE PRACTITIONER

## 2025-08-15 PROCEDURE — 82570 ASSAY OF URINE CREATININE: CPT

## 2025-08-15 PROCEDURE — 2060000000 HC ICU INTERMEDIATE R&B

## 2025-08-15 PROCEDURE — 6370000000 HC RX 637 (ALT 250 FOR IP): Performed by: INTERNAL MEDICINE

## 2025-08-15 PROCEDURE — 80053 COMPREHEN METABOLIC PANEL: CPT

## 2025-08-15 PROCEDURE — 36415 COLL VENOUS BLD VENIPUNCTURE: CPT

## 2025-08-15 PROCEDURE — 94761 N-INVAS EAR/PLS OXIMETRY MLT: CPT

## 2025-08-15 PROCEDURE — 2500000003 HC RX 250 WO HCPCS: Performed by: NURSE PRACTITIONER

## 2025-08-15 PROCEDURE — 83735 ASSAY OF MAGNESIUM: CPT

## 2025-08-15 PROCEDURE — 99222 1ST HOSP IP/OBS MODERATE 55: CPT | Performed by: STUDENT IN AN ORGANIZED HEALTH CARE EDUCATION/TRAINING PROGRAM

## 2025-08-15 PROCEDURE — 2580000003 HC RX 258: Performed by: NURSE PRACTITIONER

## 2025-08-15 PROCEDURE — 99222 1ST HOSP IP/OBS MODERATE 55: CPT | Performed by: INTERNAL MEDICINE

## 2025-08-15 PROCEDURE — 85025 COMPLETE CBC W/AUTO DIFF WBC: CPT

## 2025-08-15 PROCEDURE — 2580000003 HC RX 258

## 2025-08-15 PROCEDURE — 84156 ASSAY OF PROTEIN URINE: CPT

## 2025-08-15 PROCEDURE — 85610 PROTHROMBIN TIME: CPT

## 2025-08-15 PROCEDURE — 82947 ASSAY GLUCOSE BLOOD QUANT: CPT

## 2025-08-15 RX ORDER — HYDRALAZINE HYDROCHLORIDE 20 MG/ML
10 INJECTION INTRAMUSCULAR; INTRAVENOUS EVERY 6 HOURS PRN
Status: DISCONTINUED | OUTPATIENT
Start: 2025-08-15 | End: 2025-08-16 | Stop reason: HOSPADM

## 2025-08-15 RX ORDER — SODIUM CHLORIDE 9 MG/ML
INJECTION, SOLUTION INTRAVENOUS CONTINUOUS
Status: DISCONTINUED | OUTPATIENT
Start: 2025-08-15 | End: 2025-08-16

## 2025-08-15 RX ORDER — HYDRALAZINE HYDROCHLORIDE 50 MG/1
25 TABLET, FILM COATED ORAL EVERY 8 HOURS SCHEDULED
Status: DISCONTINUED | OUTPATIENT
Start: 2025-08-15 | End: 2025-08-16 | Stop reason: HOSPADM

## 2025-08-15 RX ORDER — INSULIN LISPRO 100 [IU]/ML
0-8 INJECTION, SOLUTION INTRAVENOUS; SUBCUTANEOUS
Status: DISCONTINUED | OUTPATIENT
Start: 2025-08-15 | End: 2025-08-16 | Stop reason: HOSPADM

## 2025-08-15 RX ORDER — AMLODIPINE BESYLATE 5 MG/1
5 TABLET ORAL DAILY
Status: DISCONTINUED | OUTPATIENT
Start: 2025-08-15 | End: 2025-08-16

## 2025-08-15 RX ORDER — INSULIN GLARGINE 100 [IU]/ML
15 INJECTION, SOLUTION SUBCUTANEOUS NIGHTLY
Status: DISCONTINUED | OUTPATIENT
Start: 2025-08-15 | End: 2025-08-16 | Stop reason: HOSPADM

## 2025-08-15 RX ADMIN — SODIUM CHLORIDE: 0.9 INJECTION, SOLUTION INTRAVENOUS at 11:48

## 2025-08-15 RX ADMIN — INSULIN GLARGINE 20 UNITS: 100 INJECTION, SOLUTION SUBCUTANEOUS at 00:53

## 2025-08-15 RX ADMIN — DEXTROSE 125 ML: 10 SOLUTION INTRAVENOUS at 22:47

## 2025-08-15 RX ADMIN — FAMOTIDINE 40 MG: 20 TABLET, FILM COATED ORAL at 00:29

## 2025-08-15 RX ADMIN — SODIUM CHLORIDE: 9 INJECTION, SOLUTION INTRAVENOUS at 01:03

## 2025-08-15 RX ADMIN — HYDRALAZINE HYDROCHLORIDE 5 MG: 20 INJECTION INTRAMUSCULAR; INTRAVENOUS at 00:29

## 2025-08-15 RX ADMIN — Medication 16 G: at 16:44

## 2025-08-15 RX ADMIN — HEPARIN SODIUM 5000 UNITS: 5000 INJECTION INTRAVENOUS; SUBCUTANEOUS at 13:20

## 2025-08-15 RX ADMIN — ATORVASTATIN CALCIUM 20 MG: 20 TABLET, FILM COATED ORAL at 08:42

## 2025-08-15 RX ADMIN — SODIUM CHLORIDE, PRESERVATIVE FREE 10 ML: 5 INJECTION INTRAVENOUS at 00:37

## 2025-08-15 RX ADMIN — HEPARIN SODIUM 5000 UNITS: 5000 INJECTION INTRAVENOUS; SUBCUTANEOUS at 00:28

## 2025-08-15 RX ADMIN — DEXTROSE 250 ML: 10 SOLUTION INTRAVENOUS at 04:26

## 2025-08-15 RX ADMIN — AMLODIPINE BESYLATE 5 MG: 5 TABLET ORAL at 08:42

## 2025-08-15 RX ADMIN — DEXTROSE 125 ML: 10 SOLUTION INTRAVENOUS at 22:39

## 2025-08-15 RX ADMIN — PANTOPRAZOLE SODIUM 40 MG: 40 TABLET, DELAYED RELEASE ORAL at 08:42

## 2025-08-15 RX ADMIN — HYDRALAZINE HYDROCHLORIDE 25 MG: 50 TABLET ORAL at 16:40

## 2025-08-15 RX ADMIN — FAMOTIDINE 40 MG: 20 TABLET, FILM COATED ORAL at 16:40

## 2025-08-15 RX ADMIN — HEPARIN SODIUM 5000 UNITS: 5000 INJECTION INTRAVENOUS; SUBCUTANEOUS at 20:35

## 2025-08-15 RX ADMIN — HYDRALAZINE HYDROCHLORIDE 25 MG: 50 TABLET ORAL at 20:32

## 2025-08-15 RX ADMIN — INSULIN GLARGINE 5 UNITS: 100 INJECTION, SOLUTION SUBCUTANEOUS at 08:42

## 2025-08-15 RX ADMIN — SODIUM CHLORIDE: 0.9 INJECTION, SOLUTION INTRAVENOUS at 16:41

## 2025-08-15 RX ADMIN — SODIUM CHLORIDE, PRESERVATIVE FREE 10 ML: 5 INJECTION INTRAVENOUS at 20:33

## 2025-08-15 ASSESSMENT — PAIN SCALES - GENERAL
PAINLEVEL_OUTOF10: 0

## 2025-08-16 VITALS
SYSTOLIC BLOOD PRESSURE: 159 MMHG | HEART RATE: 97 BPM | DIASTOLIC BLOOD PRESSURE: 90 MMHG | OXYGEN SATURATION: 99 % | TEMPERATURE: 99.7 F | RESPIRATION RATE: 19 BRPM

## 2025-08-16 LAB
25(OH)D3 SERPL-MCNC: 10.9 NG/ML (ref 30–100)
ANION GAP SERPL CALCULATED.3IONS-SCNC: 12 MMOL/L (ref 9–16)
ANION GAP SERPL CALCULATED.3IONS-SCNC: 13 MMOL/L (ref 9–16)
BUN SERPL-MCNC: 32 MG/DL (ref 6–20)
BUN SERPL-MCNC: 34 MG/DL (ref 6–20)
CA-I BLD-SCNC: 1.01 MMOL/L (ref 1.13–1.33)
CALCIUM SERPL-MCNC: 7.4 MG/DL (ref 8.6–10.4)
CALCIUM SERPL-MCNC: 8.2 MG/DL (ref 8.6–10.4)
CHLORIDE SERPL-SCNC: 103 MMOL/L (ref 98–107)
CHLORIDE SERPL-SCNC: 103 MMOL/L (ref 98–107)
CO2 SERPL-SCNC: 19 MMOL/L (ref 20–31)
CO2 SERPL-SCNC: 20 MMOL/L (ref 20–31)
CREAT SERPL-MCNC: 5.8 MG/DL (ref 0.7–1.2)
CREAT SERPL-MCNC: 5.9 MG/DL (ref 0.7–1.2)
GFR, ESTIMATED: 12 ML/MIN/1.73M2
GFR, ESTIMATED: 12 ML/MIN/1.73M2
GLUCOSE BLD-MCNC: 217 MG/DL (ref 75–110)
GLUCOSE BLD-MCNC: 241 MG/DL (ref 75–110)
GLUCOSE BLD-MCNC: 86 MG/DL (ref 75–110)
GLUCOSE SERPL-MCNC: 180 MG/DL (ref 74–99)
GLUCOSE SERPL-MCNC: 207 MG/DL (ref 74–99)
PHOSPHATE SERPL-MCNC: 3.6 MG/DL (ref 2.5–4.5)
POTASSIUM SERPL-SCNC: 4.3 MMOL/L (ref 3.7–5.3)
POTASSIUM SERPL-SCNC: 4.3 MMOL/L (ref 3.7–5.3)
SODIUM SERPL-SCNC: 135 MMOL/L (ref 136–145)
SODIUM SERPL-SCNC: 135 MMOL/L (ref 136–145)

## 2025-08-16 PROCEDURE — 6370000000 HC RX 637 (ALT 250 FOR IP): Performed by: INTERNAL MEDICINE

## 2025-08-16 PROCEDURE — 2500000003 HC RX 250 WO HCPCS: Performed by: NURSE PRACTITIONER

## 2025-08-16 PROCEDURE — 99238 HOSP IP/OBS DSCHRG MGMT 30/<: CPT | Performed by: STUDENT IN AN ORGANIZED HEALTH CARE EDUCATION/TRAINING PROGRAM

## 2025-08-16 PROCEDURE — 82947 ASSAY GLUCOSE BLOOD QUANT: CPT

## 2025-08-16 PROCEDURE — 6370000000 HC RX 637 (ALT 250 FOR IP)

## 2025-08-16 PROCEDURE — 82330 ASSAY OF CALCIUM: CPT

## 2025-08-16 PROCEDURE — 36415 COLL VENOUS BLD VENIPUNCTURE: CPT

## 2025-08-16 PROCEDURE — 6360000002 HC RX W HCPCS: Performed by: NURSE PRACTITIONER

## 2025-08-16 PROCEDURE — 6360000002 HC RX W HCPCS

## 2025-08-16 PROCEDURE — 80048 BASIC METABOLIC PNL TOTAL CA: CPT

## 2025-08-16 PROCEDURE — 6370000000 HC RX 637 (ALT 250 FOR IP): Performed by: NURSE PRACTITIONER

## 2025-08-16 PROCEDURE — 82306 VITAMIN D 25 HYDROXY: CPT

## 2025-08-16 PROCEDURE — 84100 ASSAY OF PHOSPHORUS: CPT

## 2025-08-16 RX ORDER — HYDRALAZINE HYDROCHLORIDE 25 MG/1
25 TABLET, FILM COATED ORAL EVERY 8 HOURS SCHEDULED
Qty: 90 TABLET | Refills: 3 | Status: SHIPPED | OUTPATIENT
Start: 2025-08-16

## 2025-08-16 RX ORDER — AMLODIPINE BESYLATE 10 MG/1
10 TABLET ORAL DAILY
Qty: 30 TABLET | Refills: 3 | Status: SHIPPED | OUTPATIENT
Start: 2025-08-17

## 2025-08-16 RX ORDER — AMLODIPINE BESYLATE 10 MG/1
10 TABLET ORAL DAILY
Status: DISCONTINUED | OUTPATIENT
Start: 2025-08-17 | End: 2025-08-16 | Stop reason: HOSPADM

## 2025-08-16 RX ORDER — AMLODIPINE BESYLATE 5 MG/1
5 TABLET ORAL ONCE
Status: COMPLETED | OUTPATIENT
Start: 2025-08-16 | End: 2025-08-16

## 2025-08-16 RX ORDER — CALCIUM GLUCONATE 20 MG/ML
2000 INJECTION, SOLUTION INTRAVENOUS ONCE
Status: COMPLETED | OUTPATIENT
Start: 2025-08-16 | End: 2025-08-16

## 2025-08-16 RX ADMIN — ATORVASTATIN CALCIUM 20 MG: 20 TABLET, FILM COATED ORAL at 07:41

## 2025-08-16 RX ADMIN — INSULIN LISPRO 2 UNITS: 100 INJECTION, SOLUTION INTRAVENOUS; SUBCUTANEOUS at 12:32

## 2025-08-16 RX ADMIN — HEPARIN SODIUM 5000 UNITS: 5000 INJECTION INTRAVENOUS; SUBCUTANEOUS at 04:56

## 2025-08-16 RX ADMIN — AMLODIPINE BESYLATE 5 MG: 5 TABLET ORAL at 12:07

## 2025-08-16 RX ADMIN — SODIUM CHLORIDE, PRESERVATIVE FREE 10 ML: 5 INJECTION INTRAVENOUS at 07:41

## 2025-08-16 RX ADMIN — AMLODIPINE BESYLATE 5 MG: 5 TABLET ORAL at 07:41

## 2025-08-16 RX ADMIN — HYDRALAZINE HYDROCHLORIDE 25 MG: 50 TABLET ORAL at 04:56

## 2025-08-16 RX ADMIN — CALCIUM GLUCONATE 2000 MG: 20 INJECTION, SOLUTION INTRAVENOUS at 12:28

## 2025-08-16 RX ADMIN — PANTOPRAZOLE SODIUM 40 MG: 40 TABLET, DELAYED RELEASE ORAL at 07:41

## 2025-08-16 RX ADMIN — HYDRALAZINE HYDROCHLORIDE 25 MG: 50 TABLET ORAL at 12:07

## 2025-08-16 ASSESSMENT — PAIN SCALES - GENERAL: PAINLEVEL_OUTOF10: 0

## 2025-08-17 ENCOUNTER — APPOINTMENT (OUTPATIENT)
Dept: GENERAL RADIOLOGY | Age: 35
End: 2025-08-17
Payer: MEDICARE

## 2025-08-17 ENCOUNTER — HOSPITAL ENCOUNTER (EMERGENCY)
Age: 35
Discharge: HOME | End: 2025-08-17
Attending: STUDENT IN AN ORGANIZED HEALTH CARE EDUCATION/TRAINING PROGRAM
Payer: MEDICARE

## 2025-08-17 VITALS
TEMPERATURE: 99.4 F | RESPIRATION RATE: 24 BRPM | OXYGEN SATURATION: 95 % | DIASTOLIC BLOOD PRESSURE: 93 MMHG | SYSTOLIC BLOOD PRESSURE: 160 MMHG | HEART RATE: 83 BPM

## 2025-08-17 DIAGNOSIS — R42 LIGHTHEADEDNESS: Primary | ICD-10-CM

## 2025-08-17 LAB
ANION GAP SERPL CALCULATED.3IONS-SCNC: 13 MMOL/L (ref 9–16)
BASOPHILS # BLD: 0.04 K/UL (ref 0–0.2)
BASOPHILS NFR BLD: 1 % (ref 0–2)
BUN SERPL-MCNC: 40 MG/DL (ref 6–20)
CALCIUM SERPL-MCNC: 8.1 MG/DL (ref 8.6–10.4)
CHLORIDE SERPL-SCNC: 101 MMOL/L (ref 98–107)
CHP ED QC CHECK: YES
CK SERPL-CCNC: 785 U/L (ref 39–308)
CK SERPL-CCNC: 905 U/L (ref 39–308)
CO2 SERPL-SCNC: 20 MMOL/L (ref 20–31)
CREAT SERPL-MCNC: 6.5 MG/DL (ref 0.7–1.2)
EKG ATRIAL RATE: 78 BPM
EKG P AXIS: 62 DEGREES
EKG P-R INTERVAL: 138 MS
EKG Q-T INTERVAL: 386 MS
EKG QRS DURATION: 84 MS
EKG QTC CALCULATION (BAZETT): 440 MS
EKG R AXIS: 3 DEGREES
EKG T AXIS: 29 DEGREES
EKG VENTRICULAR RATE: 78 BPM
EOSINOPHIL # BLD: 0.1 K/UL (ref 0–0.44)
EOSINOPHILS RELATIVE PERCENT: 2 % (ref 1–4)
ERYTHROCYTE [DISTWIDTH] IN BLOOD BY AUTOMATED COUNT: 12.7 % (ref 11.8–14.4)
GFR, ESTIMATED: 11 ML/MIN/1.73M2
GLUCOSE BLD-MCNC: 277 MG/DL
GLUCOSE SERPL-MCNC: 429 MG/DL (ref 74–99)
HCT VFR BLD AUTO: 28.3 % (ref 40.7–50.3)
HGB BLD-MCNC: 9.2 G/DL (ref 13–17)
IMM GRANULOCYTES # BLD AUTO: <0.03 K/UL (ref 0–0.3)
IMM GRANULOCYTES NFR BLD: 0 %
LYMPHOCYTES NFR BLD: 0.82 K/UL (ref 1.1–3.7)
LYMPHOCYTES RELATIVE PERCENT: 18 % (ref 24–43)
MAGNESIUM SERPL-MCNC: 2 MG/DL (ref 1.6–2.6)
MCH RBC QN AUTO: 30.7 PG (ref 25.2–33.5)
MCHC RBC AUTO-ENTMCNC: 32.5 G/DL (ref 28.4–34.8)
MCV RBC AUTO: 94.3 FL (ref 82.6–102.9)
MONOCYTES NFR BLD: 0.41 K/UL (ref 0.1–1.2)
MONOCYTES NFR BLD: 9 % (ref 3–12)
MYOGLOBIN SERPL-MCNC: 350 NG/ML (ref 28–72)
NEUTROPHILS NFR BLD: 70 % (ref 36–65)
NEUTS SEG NFR BLD: 3.3 K/UL (ref 1.5–8.1)
NRBC BLD-RTO: 0 PER 100 WBC
PLATELET # BLD AUTO: 202 K/UL (ref 138–453)
PMV BLD AUTO: 9.6 FL (ref 8.1–13.5)
POTASSIUM SERPL-SCNC: 5.2 MMOL/L (ref 3.7–5.3)
RBC # BLD AUTO: 3 M/UL (ref 4.21–5.77)
SODIUM SERPL-SCNC: 134 MMOL/L (ref 136–145)
TROPONIN I SERPL HS-MCNC: 48 NG/L (ref 0–22)
TROPONIN I SERPL HS-MCNC: 52 NG/L (ref 0–22)
WBC OTHER # BLD: 4.7 K/UL (ref 3.5–11.3)

## 2025-08-17 PROCEDURE — 2580000003 HC RX 258

## 2025-08-17 PROCEDURE — 82550 ASSAY OF CK (CPK): CPT

## 2025-08-17 PROCEDURE — 6360000002 HC RX W HCPCS

## 2025-08-17 PROCEDURE — 83735 ASSAY OF MAGNESIUM: CPT

## 2025-08-17 PROCEDURE — 80048 BASIC METABOLIC PNL TOTAL CA: CPT

## 2025-08-17 PROCEDURE — 85025 COMPLETE CBC W/AUTO DIFF WBC: CPT

## 2025-08-17 PROCEDURE — 84484 ASSAY OF TROPONIN QUANT: CPT

## 2025-08-17 PROCEDURE — 99285 EMERGENCY DEPT VISIT HI MDM: CPT | Performed by: STUDENT IN AN ORGANIZED HEALTH CARE EDUCATION/TRAINING PROGRAM

## 2025-08-17 PROCEDURE — 71046 X-RAY EXAM CHEST 2 VIEWS: CPT

## 2025-08-17 PROCEDURE — 83874 ASSAY OF MYOGLOBIN: CPT

## 2025-08-17 PROCEDURE — 96374 THER/PROPH/DIAG INJ IV PUSH: CPT | Performed by: STUDENT IN AN ORGANIZED HEALTH CARE EDUCATION/TRAINING PROGRAM

## 2025-08-17 PROCEDURE — 96361 HYDRATE IV INFUSION ADD-ON: CPT | Performed by: STUDENT IN AN ORGANIZED HEALTH CARE EDUCATION/TRAINING PROGRAM

## 2025-08-17 PROCEDURE — 93005 ELECTROCARDIOGRAM TRACING: CPT

## 2025-08-17 RX ORDER — ORPHENADRINE CITRATE 30 MG/ML
60 INJECTION INTRAMUSCULAR; INTRAVENOUS ONCE
Status: COMPLETED | OUTPATIENT
Start: 2025-08-17 | End: 2025-08-17

## 2025-08-17 RX ORDER — CYCLOBENZAPRINE HCL 5 MG
5 TABLET ORAL 2 TIMES DAILY PRN
Qty: 15 TABLET | Refills: 0 | Status: SHIPPED | OUTPATIENT
Start: 2025-08-17 | End: 2025-08-27

## 2025-08-17 RX ORDER — SODIUM CHLORIDE, SODIUM LACTATE, POTASSIUM CHLORIDE, AND CALCIUM CHLORIDE .6; .31; .03; .02 G/100ML; G/100ML; G/100ML; G/100ML
1000 INJECTION, SOLUTION INTRAVENOUS ONCE
Status: COMPLETED | OUTPATIENT
Start: 2025-08-17 | End: 2025-08-17

## 2025-08-17 RX ADMIN — ORPHENADRINE CITRATE 60 MG: 60 INJECTION INTRAMUSCULAR; INTRAVENOUS at 19:06

## 2025-08-17 RX ADMIN — SODIUM CHLORIDE, SODIUM LACTATE, POTASSIUM CHLORIDE, AND CALCIUM CHLORIDE 1000 ML: .6; .31; .03; .02 INJECTION, SOLUTION INTRAVENOUS at 19:05

## 2025-08-17 ASSESSMENT — PAIN - FUNCTIONAL ASSESSMENT: PAIN_FUNCTIONAL_ASSESSMENT: 0-10

## 2025-08-17 ASSESSMENT — PAIN SCALES - GENERAL: PAINLEVEL_OUTOF10: 7

## 2025-08-18 ENCOUNTER — TELEPHONE (OUTPATIENT)
Age: 35
End: 2025-08-18

## 2025-08-18 LAB
EKG ATRIAL RATE: 101 BPM
EKG P AXIS: 66 DEGREES
EKG P-R INTERVAL: 132 MS
EKG Q-T INTERVAL: 342 MS
EKG QRS DURATION: 82 MS
EKG QTC CALCULATION (BAZETT): 443 MS
EKG R AXIS: -7 DEGREES
EKG T AXIS: 45 DEGREES
EKG VENTRICULAR RATE: 101 BPM

## 2025-08-18 PROCEDURE — 93010 ELECTROCARDIOGRAM REPORT: CPT | Performed by: INTERNAL MEDICINE
